# Patient Record
Sex: MALE | Race: WHITE | Employment: OTHER | URBAN - METROPOLITAN AREA
[De-identification: names, ages, dates, MRNs, and addresses within clinical notes are randomized per-mention and may not be internally consistent; named-entity substitution may affect disease eponyms.]

---

## 2017-05-15 ENCOUNTER — HOSPITAL ENCOUNTER (OUTPATIENT)
Dept: NUCLEAR MEDICINE | Age: 67
Discharge: HOME | End: 2017-05-15
Attending: ORTHOPAEDIC SURGERY
Payer: MEDICARE

## 2017-05-15 ENCOUNTER — HOSPITAL ENCOUNTER (OUTPATIENT)
Dept: LAB | Age: 67
Discharge: HOME | End: 2017-05-15
Attending: ORTHOPAEDIC SURGERY
Payer: MEDICARE

## 2017-05-15 DIAGNOSIS — M25.562 LEFT KNEE PAIN: ICD-10-CM

## 2017-05-15 LAB
BASOPHILS # BLD AUTO: 0 K/UL (ref 0–0.2)
BASOPHILS # BLD: 1 % (ref 0–2)
CRP SERPL-MCNC: 0.8 MG/DL (ref 0–0.9)
DIFFERENTIAL METHOD BLD: ABNORMAL
EOSINOPHIL # BLD: 0.4 K/UL (ref 0–0.8)
EOSINOPHIL NFR BLD: 6 % (ref 0.5–7.8)
ERYTHROCYTE [DISTWIDTH] IN BLOOD BY AUTOMATED COUNT: 14.1 % (ref 11.9–14.6)
ERYTHROCYTE [SEDIMENTATION RATE] IN BLOOD: 32 MM/HR (ref 0–20)
HCT VFR BLD AUTO: 38.3 % (ref 41.1–50.3)
HGB BLD-MCNC: 12.3 G/DL (ref 13.6–17.2)
IMM GRANULOCYTES # BLD: 0 K/UL (ref 0–0.5)
IMM GRANULOCYTES NFR BLD AUTO: 0.2 % (ref 0–5)
LYMPHOCYTES # BLD AUTO: 27 % (ref 13–44)
LYMPHOCYTES # BLD: 1.6 K/UL (ref 0.5–4.6)
MCH RBC QN AUTO: 29.6 PG (ref 26.1–32.9)
MCHC RBC AUTO-ENTMCNC: 32.1 G/DL (ref 31.4–35)
MCV RBC AUTO: 92.1 FL (ref 79.6–97.8)
MONOCYTES # BLD: 0.4 K/UL (ref 0.1–1.3)
MONOCYTES NFR BLD AUTO: 6 % (ref 4–12)
NEUTS SEG # BLD: 3.8 K/UL (ref 1.7–8.2)
NEUTS SEG NFR BLD AUTO: 60 % (ref 43–78)
PLATELET # BLD AUTO: 231 K/UL (ref 150–450)
PMV BLD AUTO: 11.1 FL (ref 10.8–14.1)
RBC # BLD AUTO: 4.16 M/UL (ref 4.23–5.67)
WBC # BLD AUTO: 6.2 K/UL (ref 4.3–11.1)

## 2017-05-15 PROCEDURE — 78306 BONE IMAGING WHOLE BODY: CPT

## 2017-06-14 ENCOUNTER — HOSPITAL ENCOUNTER (OUTPATIENT)
Dept: PHYSICAL THERAPY | Age: 67
Discharge: HOME | End: 2017-06-14
Payer: MEDICARE

## 2017-06-14 PROCEDURE — G8978 MOBILITY CURRENT STATUS: HCPCS

## 2017-06-14 PROCEDURE — G8979 MOBILITY GOAL STATUS: HCPCS

## 2017-06-14 PROCEDURE — 97162 PT EVAL MOD COMPLEX 30 MIN: CPT

## 2017-06-14 NOTE — PROGRESS NOTES
Dominga Bradley  : 1950 [facility] [place] [address], [address] [facility] [place]  [phone]   [phone]            OUTPATIENT PHYSICAL THERAPY:Initial Assessment 2017    ICD-10: Treatment Diagnosis:   · Pain in left knee (M25.562)  · Stiffness of left knee, not elsewhere classified (M25.662)   · Low back pain (M54.5)  Precautions/Allergies:   Latex; Adhesive; Lortab [hydrocodone-acetaminophen]; and Oxycontin [oxycodone]   Fall Risk Score: 1 (? 5 = High Risk)  MD Orders: Evaluate and Treat MEDICAL/REFERRING DIAGNOSIS:  Low back pain [M54.5]  Other specified enthesopathies of right lower limb, excluding foot [M76.891]  Other specified enthesopathies of left lower limb, excluding foot [M76.892]  Pain in left knee [M25.562]  Pain in right knee [M25.561]   DATE OF ONSET: Chronic  REFERRING PHYSICIAN: Sonia Mcrae MD  RETURN PHYSICIAN APPOINTMENT: TBD     INITIAL ASSESSMENT:  Mr. Chung Euceda presents with decreased mobility, decreased strength, and pain in left knee and back secondary to degenerative changes and weakness. After discussing with patient, he agreed he would benefit from physical therapy to improve above deficits. Please sign this plan of treatment if you concur. Thank you for the opportunity to serve this patient. PROBLEM LIST (Impacting functional limitations):  1. Decreased Strength  2. Decreased Balance  3. Increased Pain  4. Decreased Activity Tolerance INTERVENTIONS PLANNED:  1. Balance Exercise  2. Bed Mobility  3. Cold  4. Heat  5. Home Exercise Program (HEP)  6. Manual Therapy  7. Neuromuscular Re-education/Strengthening  8. Range of Motion (ROM)  9. Therapeutic Exercise/Strengthening  10. Transcutaneous Electrical Nerve Stimulation (TENS)  11. Ultrasound (US)   TREATMENT PLAN:  Effective Dates: 2017 TO 2017.   Frequency/Duration: 2 times a week for 8 weeks  GOALS: (Goals have been discussed and agreed upon with patient.)  Short-Term Functional Goals: Time Frame: 3 weeks  1. Patient will be independent with home exercise program without exacerbation of symptoms or cueing needed. 2. Patient will be independent with correct sleeping positions and awareness/avoidance of aggravating positions without cueing needed. Discharge Goals: Time Frame: 8 weeks  1. Patient will be independent with all ADLs with minimal onset of knee and back pain and no deficits with daily tasks. 2. Patient will report no fear avoidance with social or recreational activities due to knee and back pain. 3. Patient will score greater than or equal to 60/80 on Lower Extremity Functional Scale with minimal effect of pain on patient's ability to manage every day life activities. Rehabilitation Potential For Stated Goals: Good  Regarding Dorothea Chavez's therapy, I certify that the treatment plan above will be carried out by a therapist or under their direction. Thank you for this referral,  Carla Mabry PT     Referring Physician Signature: Michelle Johnson MD              Date                    The information in this section was collected on 6/14/2017 (except where otherwise noted). HISTORY:   History of Present Injury/Illness (Reason for Referral):  Patient is well known to physical therapist and returns today with a new order from his surgeon. He reports that his left knee is still bothering him in an area right below his knee cap. He reports that he was having hip and back pain also, but since he got shots in his hips, that pain has decreased. He reports he still has the knee pain (left greater than right) and back pain. He reports that he is not as active as he once was when he was working. He reports he knows he needs to be more active, but it bothers his knee to walk for more than 45 minutes. He reports he would just like to be able to move around without pain in his knee.   Past Medical History/Comorbidities:   Mr. Kari Oro has a past medical history of Arthritis; CAD (coronary artery disease); Chronic pain; COPD; Diabetes ([facility] Dyslipidemia; Heart failure ([facility] Hypertension; N&V (nausea and vomiting) (4/23/2015); and Unspecified sleep apnea. He also has no past medical history of Aneurysm ([facility] Arrhythmia; Asthma; Autoimmune disease ([facility] Cancer ([facility] Chronic kidney disease; Coagulation defects; Difficult intubation; GERD (gastroesophageal reflux disease); Ill-defined condition; Liver disease; Malignant hyperthermia due to anesthesia; Morbid obesity ([facility] Pseudocholinesterase deficiency; Psychiatric disorder; PUD (peptic ulcer disease); [facility] Stroke [facility]); Thromboembolus ([facility] Thyroid disease; or Unspecified adverse effect of anesthesia. Mr. Ana Carranza  has a past surgical history that includes orthopaedic (Left, 1975); shoulder arthroscopy (Left, 2004); shoulder arthroscopy (Right, 2006); knee arthroscopy (Left, 2014); cardiac surg procedure unlist (2006); and cataract removal (Left, 2015). Social History/Living Environment:   Home Environment: Private residence  Living Alone: No  Support Systems: Family member(s), Friends \ neighbors, Spouse  Prior Level of Function/Work/Activity:  Independent  Dominant Side:         RIGHT  Personal Factors:          Sex:  male        Age:  73 y.o. Profession:  Retired  Current Medications:       Current Outpatient Prescriptions:     HYDROmorphone (DILAUDID) 2 mg tablet, Take 1-2 Tabs by mouth every four (4) hours as needed. Max Daily Amount: 24 mg., Disp: 60 Tab, Rfl: 0    losartan (COZAAR) 50 mg tablet, Take 50 mg by mouth daily.  Indications: HYPERTENSION, Disp: , Rfl:     albuterol (PROVENTIL VENTOLIN) 2.5 mg /3 mL (0.083 %) nebulizer solution, , Disp: , Rfl: 6    BESIVANCE 0.6 % drps ophthalmic suspension, , Disp: , Rfl: 1    DUREZOL 0.05 % ophthalmic emulsion, , Disp: , Rfl: 1    ketorolac (ACULAR LS) 0.4 % drop, , Disp: , Rfl: 1    ADVAIR DISKUS 250-50 mcg/dose diskus inhaler, Take 1 Puff by inhalation two (2) times a day., Disp: , Rfl:     AXIRON 30 mg/actuation (1.5 mL) slpm, , Disp: , Rfl:     rosuvastatin (CRESTOR) 20 mg tablet, Take 20 mg by mouth nightly. Indications: MIXED HYPERLIPIDEMIA, Disp: , Rfl:     albuterol (PROVENTIL HFA, VENTOLIN HFA) 90 mcg/actuation inhaler, Take 2 Puffs by inhalation every four (4) hours as needed for Wheezing or Shortness of Breath. Take if needed DOS per anesthesia protocol. Bring DOS  Indications: CHRONIC OBSTRUCTIVE PULMONARY DISEASE, Disp: , Rfl:     carvedilol (COREG) 3.125 mg tablet, Take 3.125 mg by mouth two (2) times daily (with meals). Take DOS per anesthesia protocol., Disp: , Rfl:     tiotropium (SPIRIVA WITH HANDIHALER) 18 mcg inhalation capsule, Take 1 Cap by inhalation daily. Take DOS per anesthesia protocol. Indications: CHRONIC OBSTRUCTIVE PULMONARY DISEASE WITH BRONCHOSPASMS, Disp: , Rfl:    Date Last Reviewed:  6/14/2017    Number of Personal Factors/Comorbidities that affect the Plan of Care: 1-2: MODERATE COMPLEXITY   EXAMINATION:   Observation/Orthostatic Postural Assessment:          Posture Assessment: Rounded shoulders, Forward head   Palpation:          No tenderness to palpation noted. No bruising or swelling noted.   ROM:  L LE Assessment (AROM):   · Hip Flexion: 80 degrees    · Hip Extension: 10 degrees    · Hip Abduction: 45 degrees    · Hip Adduction: 0 degrees    · Hip Internal rotation: 45  degrees    · Hip External rotation: 70 degrees    · Knee Flexion: 115 degrees    · Knee Extension: 0 degrees           Strength:  L LE Assessment (Strength):   · Hip Flexion: 4/5 with manual muscle testing    · Hip Extension: 4/5 with manual muscle testing    · Hip Abduction: 4/5 with manual muscle testing    · Hip Adduction: 4/5 with manual muscle testing    · Hip Internal rotation: 3+/5 with manual muscle testing    · Hip External rotation: 3+/5 with manual muscle testing    · Knee Flexion: 3+/5 with manual muscle testing    · Knee Extension: 3+/5 with manual muscle testing     Special Tests:          Normal tibio-femoral movement in B LE. Normal patellar mobility in all planes. Neurological Screen:        Dermatomes: Within normal limits        Reflexes:  Within normal limits  Functional Mobility:         Gait/Ambulation:  Within normal limits        Transfers:  Within normal limits   Body Structures Involved:  1. Joints  2. Muscles Body Functions Affected:  1. Movement Related Activities and Participation Affected:  1. Mobility   Number of elements (examined above) that affect the Plan of Care: 4+: HIGH COMPLEXITY   CLINICAL PRESENTATION:   Presentation: Evolving clinical presentation with changing clinical characteristics: MODERATE COMPLEXITY   CLINICAL DECISION MAKING:   Outcome Measure: Tool Used: Lower Extremity Functional Scale (LEFS)  Score:  Initial: 32/80 Most Recent: X/80 (Date: -- )   Interpretation of Score: 20 questions each scored on a 5 point scale with 0 representing \"extreme difficulty or unable to perform\" and 4 representing \"no difficulty\". The lower the score, the greater the functional disability. 80/80 represents no disability. Minimal detectable change is 9 points. Score 80 79-63 62-48 47-32 31-16 15-1 0   Modifier CH CI CJ CK CL CM CN     ? Mobility - Walking and Moving Around:     - CURRENT STATUS: CK - 40%-59% impaired, limited or restricted    - GOAL STATUS: CJ - 20%-39% impaired, limited or restricted    - D/C STATUS:  ---------------To be determined---------------    Medical Necessity:   · Patient is expected to demonstrate progress in strength, range of motion, balance and coordination to improve safety during daily activities. · Patient demonstrates good rehab potential due to higher previous functional level. · Skilled intervention continues to be required due to pain in joint.   Reason for Services/Other Comments:  · Patient continues to demonstrate capacity to improve overall mobility which will increase independence and increase safety. Use of outcome tool(s) and clinical judgement create a POC that gives a: Questionable prediction of patient's progress: MODERATE COMPLEXITY            TREATMENT:   (In addition to Assessment/Re-Assessment sessions the following treatments were rendered)  Pre-treatment Symptoms/Complaints:  6/14/2017: Patient reports he is frustrated with the pain that is still in his knee. Pain: Initial:   Pain Intensity 1: 5  Pain Location 1: Knee, Hip  Pain Orientation 1: Left  Pain Intervention(s) 1: Rest, Medication (see MAR)  Post Session:  5/10     THERAPEUTIC EXERCISE: (10 minutes):  Exercises per grid below to improve mobility, strength, balance and coordination. Required minimal verbal and manual cues to promote proper body alignment, promote proper body posture and promote proper body mechanics. Progressed resistance, range, repetitions and complexity of movement as indicated. Date:  6/14/2017   Activity/Exercise Parameters   Stationary bike HEP   Wall squats HEP   Shoe inserts HEP     Treatment/Session Assessment:    · Response to Treatment:  Patient tolerated assessment without complaints of increased pain. Patient verbalized and demonstrated understanding of HEP. · Compliance with Program/Exercises: Will assess as treatment progresses. · Recommendations/Intent for next treatment session: \"Next visit will focus on advancements to more challenging activities\".   Total Treatment Duration:  PT Patient Time In/Time Out  Time In: 0830  Time Out: [address], PT

## 2017-06-14 NOTE — PROGRESS NOTES
Delia Dolan  : 1950 [facility] [place] [address], [address] [facility]  [phone]   [phone]          OUTPATIENT ORTHOPAEDIC PHYSICAL THERAPY    NAME/AGE/GENDER: Delia Dolan is a 73 y.o. male    DATE: 2017                       Ambulatory/Rehab Services H2 Model Falls Risk Assessment    Risk Factor Pts. ·   Confusion/Disorientation/Impulsivity  []    4 ·   Symptomatic Depression  []   2 ·   Altered Elimination  []   1 ·   Dizziness/Vertigo  []   1 ·   Gender (Male)  [x]   1 ·   Any administered antiepileptics (anticonvulsants):  []   2 ·   Any administered benzodiazepines:  []   1 ·   Visual Impairment (specify):  []   1 ·   Portable Oxygen Use  []   1 ·   Orthostatic ? BP  []   1 ·   History of Recent Falls (within 3 mos.)  []   5     Ability to Rise from Chair (choose one) Pts. ·   Ability to rise in a single movement  [x]   0 ·   Pushes up, successful in one attempt  []   1 ·   Multiple attempts, but unsuccessful  []   3 ·   Unable to rise without assistance  []   4   Total: (5 or greater = High Risk) 1     Falls Prevention Plan:   []                Physical Limitations to Exercise (specify):   []                Mobility Assistance Device (type):   []                Exercise/Equipment Adaptation (specify):    © [facility] . [facility] [place] Patent #[id number].  Federal Law prohibits the replication, distribution or use without written permission from [facility] of [address] [place] [address]

## 2017-06-16 ENCOUNTER — HOSPITAL ENCOUNTER (OUTPATIENT)
Dept: PHYSICAL THERAPY | Age: 67
Discharge: HOME | End: 2017-06-16
Payer: MEDICARE

## 2017-06-16 PROCEDURE — 97110 THERAPEUTIC EXERCISES: CPT

## 2017-06-16 NOTE — PROGRESS NOTES
Zoya Hernandez  : 1950 [facility] [address], [address] [place] [address],[address], [place] [address].  [phone]   [phone]            OUTPATIENT PHYSICAL THERAPY:Daily Note 2017    ICD-10: Treatment Diagnosis:   · Pain in left knee (M25.562)  · Stiffness of left knee, not elsewhere classified (M25.662)   · Low back pain (M54.5)  Precautions/Allergies:   Latex; Adhesive; Lortab [hydrocodone-acetaminophen]; and Oxycontin [oxycodone]   Fall Risk Score: 1 (? 5 = High Risk)  MD Orders: Evaluate and Treat MEDICAL/REFERRING DIAGNOSIS:  Low back pain [M54.5]  Other specified enthesopathies of right lower limb, excluding foot [M76.891]  Other specified enthesopathies of left lower limb, excluding foot [M76.892]  Pain in left knee [M25.562]  Pain in right knee [M25.561]   DATE OF ONSET: Chronic  REFERRING PHYSICIAN: Agustín Herrera MD  RETURN PHYSICIAN APPOINTMENT: TBD     INITIAL ASSESSMENT:  Mr. Oleg Hutchinson presents with decreased mobility, decreased strength, and pain in left knee and back secondary to degenerative changes and weakness. Patient has attended a total of 2 scheduled physical therapy visits including initial evaluation on 2017. Treatment has consisted of strengthening, stretching, manual, and modalities to improve overall mobility, pain, and performance with activities of daily living. PROBLEM LIST (Impacting functional limitations):  1. Decreased Strength  2. Decreased Balance  3. Increased Pain  4. Decreased Activity Tolerance INTERVENTIONS PLANNED:  1. Balance Exercise  2. Bed Mobility  3. Cold  4. Heat  5. Home Exercise Program (HEP)  6. Manual Therapy  7. Neuromuscular Re-education/Strengthening  8. Range of Motion (ROM)  9. Therapeutic Exercise/Strengthening  10. Transcutaneous Electrical Nerve Stimulation (TENS)  11. Ultrasound (US)   TREATMENT PLAN:  Effective Dates: 2017 TO 2017.   Frequency/Duration: 2 times a week for 8 weeks  GOALS: (Goals have been discussed and agreed upon with patient.)  Short-Term Functional Goals: Time Frame: 3 weeks  1. Patient will be independent with home exercise program without exacerbation of symptoms or cueing needed. 2. Patient will be independent with correct sleeping positions and awareness/avoidance of aggravating positions without cueing needed. Discharge Goals: Time Frame: 8 weeks  1. Patient will be independent with all ADLs with minimal onset of knee and back pain and no deficits with daily tasks. 2. Patient will report no fear avoidance with social or recreational activities due to knee and back pain. 3. Patient will score greater than or equal to 60/80 on Lower Extremity Functional Scale with minimal effect of pain on patient's ability to manage every day life activities. Rehabilitation Potential For Stated Goals: Good            The information in this section was collected on 6/14/2017 (except where otherwise noted). HISTORY:   History of Present Injury/Illness (Reason for Referral):  Patient is well known to physical therapist and returns today with a new order from his surgeon. He reports that his left knee is still bothering him in an area right below his knee cap. He reports that he was having hip and back pain also, but since he got shots in his hips, that pain has decreased. He reports he still has the knee pain (left greater than right) and back pain. He reports that he is not as active as he once was when he was working. He reports he knows he needs to be more active, but it bothers his knee to walk for more than 45 minutes. He reports he would just like to be able to move around without pain in his knee. Past Medical History/Comorbidities:   Mr. Lesly Flores  has a past medical history of Arthritis; CAD (coronary artery disease); Chronic pain; COPD; Diabetes ([address] Dyslipidemia; Heart failure ([facility] [address]  Hypertension; N&V (nausea and vomiting) (4/23/2015); and Unspecified sleep apnea. He also has no past medical history of Aneurysm ([facility] [address] Arrhythmia; Asthma; Autoimmune disease ([facility] [address] Cancer ([facility] [address] Chronic kidney disease; Coagulation defects; Difficult intubation; GERD (gastroesophageal reflux disease); Ill-defined condition; Liver disease; Malignant hyperthermia due to anesthesia; Morbid obesity ([facility] [address] Pseudocholinesterase deficiency; Psychiatric disorder; PUD (peptic ulcer disease); Seizures ([facility] [address] Stroke [facility]); Thromboembolus ([facility] [address] Thyroid disease; or Unspecified adverse effect of anesthesia. Mr. Antony Funez  has a past surgical history that includes orthopaedic (Left, 1975); shoulder arthroscopy (Left, 2004); shoulder arthroscopy (Right, 2006); knee arthroscopy (Left, 2014); cardiac surg procedure unlist (2006); and cataract removal (Left, 2015). Social History/Living Environment:   Home Environment: Private residence  Living Alone: No  Support Systems: Family member(s), Friends \ neighbors, Spouse  Prior Level of Function/Work/Activity:  Independent  Dominant Side:         RIGHT  Personal Factors:          Sex:  male        Age:  73 y.o. Profession:  Retired  Current Medications:       Current Outpatient Prescriptions:     HYDROmorphone (DILAUDID) 2 mg tablet, Take 1-2 Tabs by mouth every four (4) hours as needed. Max Daily Amount: 24 mg., Disp: 60 Tab, Rfl: 0    losartan (COZAAR) 50 mg tablet, Take 50 mg by mouth daily. Indications: HYPERTENSION, Disp: , Rfl:     albuterol (PROVENTIL VENTOLIN) 2.5 mg /3 mL (0.083 %) nebulizer solution, , Disp: , Rfl: 6    BESIVANCE 0.6 % drps ophthalmic suspension, , Disp: , Rfl: 1    DUREZOL 0.05 % ophthalmic emulsion, , Disp: , Rfl: 1    ketorolac (ACULAR LS) 0.4 % drop, , Disp: , Rfl: 1    ADVAIR DISKUS 250-50 mcg/dose diskus inhaler, Take 1 Puff by inhalation two (2) times a day., Disp: , Rfl:     AXIRON 30 mg/actuation (1.5 mL) slpm, , Disp: , Rfl:     rosuvastatin (CRESTOR) 20 mg tablet, Take 20 mg by mouth nightly.  Indications: MIXED HYPERLIPIDEMIA, Disp: , Rfl:     albuterol (PROVENTIL HFA, VENTOLIN HFA) 90 mcg/actuation inhaler, Take 2 Puffs by inhalation every four (4) hours as needed for Wheezing or Shortness of Breath. Take if needed DOS per anesthesia protocol. Bring DOS  Indications: CHRONIC OBSTRUCTIVE PULMONARY DISEASE, Disp: , Rfl:     carvedilol (COREG) 3.125 mg tablet, Take 3.125 mg by mouth two (2) times daily (with meals). Take DOS per anesthesia protocol., Disp: , Rfl:     tiotropium (SPIRIVA WITH HANDIHALER) 18 mcg inhalation capsule, Take 1 Cap by inhalation daily. Take DOS per anesthesia protocol. Indications: CHRONIC OBSTRUCTIVE PULMONARY DISEASE WITH BRONCHOSPASMS, Disp: , Rfl:    Date Last Reviewed:  6/16/2017    Number of Personal Factors/Comorbidities that affect the Plan of Care: 1-2: MODERATE COMPLEXITY   EXAMINATION:   Observation/Orthostatic Postural Assessment:          Posture Assessment: Rounded shoulders, Forward head   Palpation:          No tenderness to palpation noted. No bruising or swelling noted. ROM:  L LE Assessment (AROM):   · Hip Flexion: 80 degrees    · Hip Extension: 10 degrees    · Hip Abduction: 45 degrees    · Hip Adduction: 0 degrees    · Hip Internal rotation: 45  degrees    · Hip External rotation: 70 degrees    · Knee Flexion: 115 degrees    · Knee Extension: 0 degrees           Strength:  L LE Assessment (Strength):   · Hip Flexion: 4/5 with manual muscle testing    · Hip Extension: 4/5 with manual muscle testing    · Hip Abduction: 4/5 with manual muscle testing    · Hip Adduction: 4/5 with manual muscle testing    · Hip Internal rotation: 3+/5 with manual muscle testing    · Hip External rotation: 3+/5 with manual muscle testing    · Knee Flexion: 3+/5 with manual muscle testing    · Knee Extension: 3+/5 with manual muscle testing     Special Tests:          Normal tibio-femoral movement in B LE. Normal patellar mobility in all planes.   Neurological Screen:        Dermatomes: Within normal limits        Reflexes:  Within normal limits  Functional Mobility:         Gait/Ambulation:  Within normal limits        Transfers:  Within normal limits   Body Structures Involved:  1. Joints  2. Muscles Body Functions Affected:  1. Movement Related Activities and Participation Affected:  1. Mobility   Number of elements (examined above) that affect the Plan of Care: 4+: HIGH COMPLEXITY   CLINICAL PRESENTATION:   Presentation: Evolving clinical presentation with changing clinical characteristics: MODERATE COMPLEXITY   CLINICAL DECISION MAKING:   Outcome Measure: Tool Used: Lower Extremity Functional Scale (LEFS)  Score:  Initial: 32/80 Most Recent: X/80 (Date: -- )   Interpretation of Score: 20 questions each scored on a 5 point scale with 0 representing \"extreme difficulty or unable to perform\" and 4 representing \"no difficulty\". The lower the score, the greater the functional disability. 80/80 represents no disability. Minimal detectable change is 9 points. Score 80 79-63 62-48 47-32 31-16 15-1 0   Modifier CH CI CJ CK CL CM CN     ? Mobility - Walking and Moving Around:     - CURRENT STATUS: CK - 40%-59% impaired, limited or restricted    - GOAL STATUS: CJ - 20%-39% impaired, limited or restricted    - D/C STATUS:  ---------------To be determined---------------    Medical Necessity:   · Patient is expected to demonstrate progress in strength, range of motion, balance and coordination to improve safety during daily activities. · Patient demonstrates good rehab potential due to higher previous functional level. · Skilled intervention continues to be required due to pain in joint. Reason for Services/Other Comments:  · Patient continues to demonstrate capacity to improve overall mobility which will increase independence and increase safety.    Use of outcome tool(s) and clinical judgement create a POC that gives a: Questionable prediction of patient's progress: MODERATE COMPLEXITY            TREATMENT:   (In addition to Assessment/Re-Assessment sessions the following treatments were rendered)  Pre-treatment Symptoms/Complaints:  6/16/2017: Patient reports he has been riding his bike at home. Pain: Initial:   Pain Intensity 1: 5  Pain Location 1: Knee, Hip  Pain Orientation 1: Left  Pain Intervention(s) 1: Rest, Medication (see MAR)  Post Session:  5/10     THERAPEUTIC EXERCISE: (45 minutes):  Exercises per grid below to improve mobility, strength, balance and coordination. Required minimal verbal and manual cues to promote proper body alignment, promote proper body posture and promote proper body mechanics. Progressed resistance, range, repetitions and complexity of movement as indicated. Date:  6/16/2017   Activity/Exercise Parameters   Nu-step 6 minutes   Level 6   Step up and overs 6 inch  10 reps  B LE   Wall squats 15 reps   Slow march in edwards 2 laps   Side step in edwards 2 laps   Kneeling on hilo table with weight shifts 5 minutes   Leg press 160 pounds  20 reps   Leg extension 60 pounds  20 reps   Leg curl 60 pounds  20 reps   Airdyne 6 minutes   Calf stretch on slant board Gastroc and soleus stretch  3 reps each  10 second hold   Active hamstring stretch 10 ankle pumps  2 reps   Piriformis/posterior thigh stretch 10 second hold  Push and pull  3 reps     Treatment/Session Assessment:    · Response to Treatment:  Patient tolerated treatment without complaints of increased pain. · Compliance with Program/Exercises: Will assess as treatment progresses. · Recommendations/Intent for next treatment session: \"Next visit will focus on advancements to more challenging activities\".   Total Treatment Duration:  PT Patient Time In/Time Out  Time In: 1515  Time Out: [address], PT

## 2017-06-21 ENCOUNTER — HOSPITAL ENCOUNTER (OUTPATIENT)
Dept: PHYSICAL THERAPY | Age: 67
Discharge: HOME | End: 2017-06-21
Payer: MEDICARE

## 2017-06-21 PROCEDURE — 97110 THERAPEUTIC EXERCISES: CPT

## 2017-06-21 NOTE — PROGRESS NOTES
Zoya Hernandez  : 1950 [facility] [address], [address] [place] [address],[address], [place] [address].  [phone]   [phone]            OUTPATIENT PHYSICAL THERAPY:Daily Note 2017    ICD-10: Treatment Diagnosis:   · Pain in left knee (M25.562)  · Stiffness of left knee, not elsewhere classified (M25.662)   · Low back pain (M54.5)  Precautions/Allergies:   Latex; Adhesive; Lortab [hydrocodone-acetaminophen]; and Oxycontin [oxycodone]   Fall Risk Score: 1 (? 5 = High Risk)  MD Orders: Evaluate and Treat MEDICAL/REFERRING DIAGNOSIS:  Low back pain [M54.5]  Other specified enthesopathies of right lower limb, excluding foot [M76.891]  Other specified enthesopathies of left lower limb, excluding foot [M76.892]  Pain in left knee [M25.562]  Pain in right knee [M25.561]   DATE OF ONSET: Chronic  REFERRING PHYSICIAN: Agustín Herrera MD  RETURN PHYSICIAN APPOINTMENT: TBD     INITIAL ASSESSMENT:  Mr. Oleg Hutchinson presents with decreased mobility, decreased strength, and pain in left knee and back secondary to degenerative changes and weakness. Patient has attended a total of 3 scheduled physical therapy visits including initial evaluation on 2017. Treatment has consisted of strengthening, stretching, manual, and modalities to improve overall mobility, pain, and performance with activities of daily living. PROBLEM LIST (Impacting functional limitations):  1. Decreased Strength  2. Decreased Balance  3. Increased Pain  4. Decreased Activity Tolerance INTERVENTIONS PLANNED:  1. Balance Exercise  2. Bed Mobility  3. Cold  4. Heat  5. Home Exercise Program (HEP)  6. Manual Therapy  7. Neuromuscular Re-education/Strengthening  8. Range of Motion (ROM)  9. Therapeutic Exercise/Strengthening  10. Transcutaneous Electrical Nerve Stimulation (TENS)  11. Ultrasound (US)   TREATMENT PLAN:  Effective Dates: 2017 TO 2017.   Frequency/Duration: 2 times a week for 8 weeks  GOALS: (Goals have been discussed and agreed upon with patient.)  Short-Term Functional Goals: Time Frame: 3 weeks  1. Patient will be independent with home exercise program without exacerbation of symptoms or cueing needed. 2. Patient will be independent with correct sleeping positions and awareness/avoidance of aggravating positions without cueing needed. Discharge Goals: Time Frame: 8 weeks  1. Patient will be independent with all ADLs with minimal onset of knee and back pain and no deficits with daily tasks. 2. Patient will report no fear avoidance with social or recreational activities due to knee and back pain. 3. Patient will score greater than or equal to 60/80 on Lower Extremity Functional Scale with minimal effect of pain on patient's ability to manage every day life activities. Rehabilitation Potential For Stated Goals: Good            The information in this section was collected on 6/14/2017 (except where otherwise noted). HISTORY:   History of Present Injury/Illness (Reason for Referral):  Patient is well known to physical therapist and returns today with a new order from his surgeon. He reports that his left knee is still bothering him in an area right below his knee cap. He reports that he was having hip and back pain also, but since he got shots in his hips, that pain has decreased. He reports he still has the knee pain (left greater than right) and back pain. He reports that he is not as active as he once was when he was working. He reports he knows he needs to be more active, but it bothers his knee to walk for more than 45 minutes. He reports he would just like to be able to move around without pain in his knee. Past Medical History/Comorbidities:   Mr. Oleg Hutchinson  has a past medical history of Arthritis; CAD (coronary artery disease); Chronic pain; COPD; Diabetes ([address] Dyslipidemia; Heart failure ([facility] [address]  Hypertension; N&V (nausea and vomiting) (4/23/2015); and Unspecified sleep apnea. He also has no past medical history of Aneurysm ([facility] [address] Arrhythmia; Asthma; Autoimmune disease ([facility] [address] Cancer ([facility] [address] Chronic kidney disease; Coagulation defects; Difficult intubation; GERD (gastroesophageal reflux disease); Ill-defined condition; Liver disease; Malignant hyperthermia due to anesthesia; Morbid obesity ([facility] [address] Pseudocholinesterase deficiency; Psychiatric disorder; PUD (peptic ulcer disease); Seizures ([facility] [address] Stroke [facility]); Thromboembolus ([facility] [address] Thyroid disease; or Unspecified adverse effect of anesthesia. Mr. Colletta Ladd  has a past surgical history that includes orthopaedic (Left, 1975); shoulder arthroscopy (Left, 2004); shoulder arthroscopy (Right, 2006); knee arthroscopy (Left, 2014); cardiac surg procedure unlist (2006); and cataract removal (Left, 2015). Social History/Living Environment:   Home Environment: Private residence  Living Alone: No  Support Systems: Family member(s), Friends \ neighbors, Spouse  Prior Level of Function/Work/Activity:  Independent  Dominant Side:         RIGHT  Personal Factors:          Sex:  male        Age:  73 y.o. Profession:  Retired  Current Medications:       Current Outpatient Prescriptions:     HYDROmorphone (DILAUDID) 2 mg tablet, Take 1-2 Tabs by mouth every four (4) hours as needed. Max Daily Amount: 24 mg., Disp: 60 Tab, Rfl: 0    losartan (COZAAR) 50 mg tablet, Take 50 mg by mouth daily. Indications: HYPERTENSION, Disp: , Rfl:     albuterol (PROVENTIL VENTOLIN) 2.5 mg /3 mL (0.083 %) nebulizer solution, , Disp: , Rfl: 6    BESIVANCE 0.6 % drps ophthalmic suspension, , Disp: , Rfl: 1    DUREZOL 0.05 % ophthalmic emulsion, , Disp: , Rfl: 1    ketorolac (ACULAR LS) 0.4 % drop, , Disp: , Rfl: 1    ADVAIR DISKUS 250-50 mcg/dose diskus inhaler, Take 1 Puff by inhalation two (2) times a day., Disp: , Rfl:     AXIRON 30 mg/actuation (1.5 mL) slpm, , Disp: , Rfl:     rosuvastatin (CRESTOR) 20 mg tablet, Take 20 mg by mouth nightly.  Indications: MIXED HYPERLIPIDEMIA, Disp: , Rfl:     albuterol (PROVENTIL HFA, VENTOLIN HFA) 90 mcg/actuation inhaler, Take 2 Puffs by inhalation every four (4) hours as needed for Wheezing or Shortness of Breath. Take if needed DOS per anesthesia protocol. Bring DOS  Indications: CHRONIC OBSTRUCTIVE PULMONARY DISEASE, Disp: , Rfl:     carvedilol (COREG) 3.125 mg tablet, Take 3.125 mg by mouth two (2) times daily (with meals). Take DOS per anesthesia protocol., Disp: , Rfl:     tiotropium (SPIRIVA WITH HANDIHALER) 18 mcg inhalation capsule, Take 1 Cap by inhalation daily. Take DOS per anesthesia protocol. Indications: CHRONIC OBSTRUCTIVE PULMONARY DISEASE WITH BRONCHOSPASMS, Disp: , Rfl:    Date Last Reviewed:  6/21/2017    Number of Personal Factors/Comorbidities that affect the Plan of Care: 1-2: MODERATE COMPLEXITY   EXAMINATION:   Observation/Orthostatic Postural Assessment:          Posture Assessment: Rounded shoulders, Forward head   Palpation:          No tenderness to palpation noted. No bruising or swelling noted. ROM:  L LE Assessment (AROM):   · Hip Flexion: 80 degrees    · Hip Extension: 10 degrees    · Hip Abduction: 45 degrees    · Hip Adduction: 0 degrees    · Hip Internal rotation: 45  degrees    · Hip External rotation: 70 degrees    · Knee Flexion: 115 degrees    · Knee Extension: 0 degrees           Strength:  L LE Assessment (Strength):   · Hip Flexion: 4/5 with manual muscle testing    · Hip Extension: 4/5 with manual muscle testing    · Hip Abduction: 4/5 with manual muscle testing    · Hip Adduction: 4/5 with manual muscle testing    · Hip Internal rotation: 3+/5 with manual muscle testing    · Hip External rotation: 3+/5 with manual muscle testing    · Knee Flexion: 3+/5 with manual muscle testing    · Knee Extension: 3+/5 with manual muscle testing     Special Tests:          Normal tibio-femoral movement in B LE. Normal patellar mobility in all planes.   Neurological Screen:        Dermatomes: Within normal limits        Reflexes:  Within normal limits  Functional Mobility:         Gait/Ambulation:  Within normal limits        Transfers:  Within normal limits   Body Structures Involved:  1. Joints  2. Muscles Body Functions Affected:  1. Movement Related Activities and Participation Affected:  1. Mobility   Number of elements (examined above) that affect the Plan of Care: 4+: HIGH COMPLEXITY   CLINICAL PRESENTATION:   Presentation: Evolving clinical presentation with changing clinical characteristics: MODERATE COMPLEXITY   CLINICAL DECISION MAKING:   Outcome Measure: Tool Used: Lower Extremity Functional Scale (LEFS)  Score:  Initial: 32/80 Most Recent: X/80 (Date: -- )   Interpretation of Score: 20 questions each scored on a 5 point scale with 0 representing \"extreme difficulty or unable to perform\" and 4 representing \"no difficulty\". The lower the score, the greater the functional disability. 80/80 represents no disability. Minimal detectable change is 9 points. Score 80 79-63 62-48 47-32 31-16 15-1 0   Modifier CH CI CJ CK CL CM CN     ? Mobility - Walking and Moving Around:     - CURRENT STATUS: CK - 40%-59% impaired, limited or restricted    - GOAL STATUS: CJ - 20%-39% impaired, limited or restricted    - D/C STATUS:  ---------------To be determined---------------    Medical Necessity:   · Patient is expected to demonstrate progress in strength, range of motion, balance and coordination to improve safety during daily activities. · Patient demonstrates good rehab potential due to higher previous functional level. · Skilled intervention continues to be required due to pain in joint. Reason for Services/Other Comments:  · Patient continues to demonstrate capacity to improve overall mobility which will increase independence and increase safety.    Use of outcome tool(s) and clinical judgement create a POC that gives a: Questionable prediction of patient's progress: MODERATE COMPLEXITY            TREATMENT:   (In addition to Assessment/Re-Assessment sessions the following treatments were rendered)  Pre-treatment Symptoms/Complaints:  6/21/2017: Patient reports he was sore after his last session. Pain: Initial:   Pain Intensity 1: 5  Pain Location 1: Knee, Hip  Pain Orientation 1: Left  Pain Intervention(s) 1: Rest, Medication (see MAR)  Post Session:  5/10     THERAPEUTIC EXERCISE: (45 minutes):  Exercises per grid below to improve mobility, strength, balance and coordination. Required minimal verbal and manual cues to promote proper body alignment, promote proper body posture and promote proper body mechanics. Progressed resistance, range, repetitions and complexity of movement as indicated. Date:  6/21/2017   Activity/Exercise Parameters   Nu-step 7 minutes   Level 5   Step up and overs 8 inch  10 reps  B LE   Wall squats 15 reps   Slow march in edwards 2 laps   Kneeling on hilo table with weight shifts 5 minutes   Leg press 160 pounds  20 reps   Leg extension 60 pounds  20 reps   Leg curl 60 pounds  20 reps   Eliptical 3 minutes   Calf stretch on slant board Gastroc and soleus stretch  3 reps each  10 second hold     Treatment/Session Assessment:    · Response to Treatment:  Patient tolerated treatment without complaints of increased pain. · Compliance with Program/Exercises: Will assess as treatment progresses. · Recommendations/Intent for next treatment session: \"Next visit will focus on advancements to more challenging activities\".   Total Treatment Duration:  PT Patient Time In/Time Out  Time In: 0930  Time Out: [address], PT

## 2017-06-23 ENCOUNTER — HOSPITAL ENCOUNTER (OUTPATIENT)
Dept: PHYSICAL THERAPY | Age: 67
Discharge: HOME | End: 2017-06-23
Payer: MEDICARE

## 2017-06-23 PROCEDURE — 97110 THERAPEUTIC EXERCISES: CPT

## 2017-06-23 NOTE — PROGRESS NOTES
Yosef Bunn  : 1950 [facility] at [address], [address] [place] [address],[address], [facility] [address].  [phone]   [phone]            OUTPATIENT PHYSICAL THERAPY:Daily Note 2017    ICD-10: Treatment Diagnosis:   · Pain in left knee (M25.562)  · Stiffness of left knee, not elsewhere classified (M25.662)   · Low back pain (M54.5)  Precautions/Allergies:   Latex; Adhesive; Lortab [hydrocodone-acetaminophen]; and Oxycontin [oxycodone]   Fall Risk Score: 1 (? 5 = High Risk)  MD Orders: Evaluate and Treat MEDICAL/REFERRING DIAGNOSIS:  Low back pain [M54.5]  Other specified enthesopathies of right lower limb, excluding foot [M76.891]  Other specified enthesopathies of left lower limb, excluding foot [M76.892]  Pain in left knee [M25.562]  Pain in right knee [M25.561]   DATE OF ONSET: Chronic  REFERRING PHYSICIAN: Ashley Reynaga MD  RETURN PHYSICIAN APPOINTMENT: TBD     INITIAL ASSESSMENT:  Mr. Liza Kyle presents with decreased mobility, decreased strength, and pain in left knee and back secondary to degenerative changes and weakness. Patient has attended a total of 4 scheduled physical therapy visits including initial evaluation on 2017. Treatment has consisted of strengthening, stretching, manual, and modalities to improve overall mobility, pain, and performance with activities of daily living. PROBLEM LIST (Impacting functional limitations):  1. Decreased Strength  2. Decreased Balance  3. Increased Pain  4. Decreased Activity Tolerance INTERVENTIONS PLANNED:  1. Balance Exercise  2. Bed Mobility  3. Cold  4. Heat  5. Home Exercise Program (HEP)  6. Manual Therapy  7. Neuromuscular Re-education/Strengthening  8. Range of Motion (ROM)  9. Therapeutic Exercise/Strengthening  10. Transcutaneous Electrical Nerve Stimulation (TENS)  11. Ultrasound (US)   TREATMENT PLAN:  Effective Dates: 2017 TO 2017.   Frequency/Duration: 2 times a week for 8 weeks  GOALS: (Goals have been discussed and agreed upon with patient.)  Short-Term Functional Goals: Time Frame: 3 weeks  1. Patient will be independent with home exercise program without exacerbation of symptoms or cueing needed. 2. Patient will be independent with correct sleeping positions and awareness/avoidance of aggravating positions without cueing needed. Discharge Goals: Time Frame: 8 weeks  1. Patient will be independent with all ADLs with minimal onset of knee and back pain and no deficits with daily tasks. 2. Patient will report no fear avoidance with social or recreational activities due to knee and back pain. 3. Patient will score greater than or equal to 60/80 on Lower Extremity Functional Scale with minimal effect of pain on patient's ability to manage every day life activities. Rehabilitation Potential For Stated Goals: Good            The information in this section was collected on 6/14/2017 (except where otherwise noted). HISTORY:   History of Present Injury/Illness (Reason for Referral):  Patient is well known to physical therapist and returns today with a new order from his surgeon. He reports that his left knee is still bothering him in an area right below his knee cap. He reports that he was having hip and back pain also, but since he got shots in his hips, that pain has decreased. He reports he still has the knee pain (left greater than right) and back pain. He reports that he is not as active as he once was when he was working. He reports he knows he needs to be more active, but it bothers his knee to walk for more than 45 minutes. He reports he would just like to be able to move around without pain in his knee. Past Medical History/Comorbidities:   Mr. Danie Bautista  has a past medical history of Arthritis; CAD (coronary artery disease); Chronic pain; COPD; Diabetes ([address] Dyslipidemia; Heart failure ([address]  Hypertension; N&V (nausea and vomiting) (4/23/2015); and Unspecified sleep apnea. He also has no past medical history of Aneurysm ([facility] Arrhythmia; Asthma; Autoimmune disease ([facility] Cancer ([facility] Chronic kidney disease; Coagulation defects; Difficult intubation; GERD (gastroesophageal reflux disease); Ill-defined condition; Liver disease; Malignant hyperthermia due to anesthesia; Morbid obesity ([facility] Pseudocholinesterase deficiency; Psychiatric disorder; PUD (peptic ulcer disease); Seizures ([facility] Stroke [facility]); Thromboembolus ([facility] Thyroid disease; or Unspecified adverse effect of anesthesia. Mr. Liza Kyle  has a past surgical history that includes orthopaedic (Left, 1975); shoulder arthroscopy (Left, 2004); shoulder arthroscopy (Right, 2006); knee arthroscopy (Left, 2014); cardiac surg procedure unlist (2006); and cataract removal (Left, 2015). Social History/Living Environment:   Home Environment: Private residence  Living Alone: No  Support Systems: Family member(s), Friends \ neighbors, Spouse  Prior Level of Function/Work/Activity:  Independent  Dominant Side:         RIGHT  Personal Factors:          Sex:  male        Age:  73 y.o. Profession:  Retired  Current Medications:       Current Outpatient Prescriptions:     HYDROmorphone (DILAUDID) 2 mg tablet, Take 1-2 Tabs by mouth every four (4) hours as needed. Max Daily Amount: 24 mg., Disp: 60 Tab, Rfl: 0    losartan (COZAAR) 50 mg tablet, Take 50 mg by mouth daily. Indications: HYPERTENSION, Disp: , Rfl:     albuterol (PROVENTIL VENTOLIN) 2.5 mg /3 mL (0.083 %) nebulizer solution, , Disp: , Rfl: 6    BESIVANCE 0.6 % drps ophthalmic suspension, , Disp: , Rfl: 1    DUREZOL 0.05 % ophthalmic emulsion, , Disp: , Rfl: 1    ketorolac (ACULAR LS) 0.4 % drop, , Disp: , Rfl: 1    ADVAIR DISKUS 250-50 mcg/dose diskus inhaler, Take 1 Puff by inhalation two (2) times a day., Disp: , Rfl:     AXIRON 30 mg/actuation (1.5 mL) slpm, , Disp: , Rfl:     rosuvastatin (CRESTOR) 20 mg tablet, Take 20 mg by mouth nightly.  Indications: MIXED HYPERLIPIDEMIA, Disp: , Rfl:     albuterol (PROVENTIL HFA, VENTOLIN HFA) 90 mcg/actuation inhaler, Take 2 Puffs by inhalation every four (4) hours as needed for Wheezing or Shortness of Breath. Take if needed DOS per anesthesia protocol. Bring DOS  Indications: CHRONIC OBSTRUCTIVE PULMONARY DISEASE, Disp: , Rfl:     carvedilol (COREG) 3.125 mg tablet, Take 3.125 mg by mouth two (2) times daily (with meals). Take DOS per anesthesia protocol., Disp: , Rfl:     tiotropium (SPIRIVA WITH HANDIHALER) 18 mcg inhalation capsule, Take 1 Cap by inhalation daily. Take DOS per anesthesia protocol. Indications: CHRONIC OBSTRUCTIVE PULMONARY DISEASE WITH BRONCHOSPASMS, Disp: , Rfl:    Date Last Reviewed:  6/23/2017    Number of Personal Factors/Comorbidities that affect the Plan of Care: 1-2: MODERATE COMPLEXITY   EXAMINATION:   Observation/Orthostatic Postural Assessment:          Posture Assessment: Rounded shoulders, Forward head   Palpation:          No tenderness to palpation noted. No bruising or swelling noted. ROM:  L LE Assessment (AROM):   · Hip Flexion: 80 degrees    · Hip Extension: 10 degrees    · Hip Abduction: 45 degrees    · Hip Adduction: 0 degrees    · Hip Internal rotation: 45  degrees    · Hip External rotation: 70 degrees    · Knee Flexion: 115 degrees    · Knee Extension: 0 degrees           Strength:  L LE Assessment (Strength):   · Hip Flexion: 4/5 with manual muscle testing    · Hip Extension: 4/5 with manual muscle testing    · Hip Abduction: 4/5 with manual muscle testing    · Hip Adduction: 4/5 with manual muscle testing    · Hip Internal rotation: 3+/5 with manual muscle testing    · Hip External rotation: 3+/5 with manual muscle testing    · Knee Flexion: 3+/5 with manual muscle testing    · Knee Extension: 3+/5 with manual muscle testing     Special Tests:          Normal tibio-femoral movement in B LE. Normal patellar mobility in all planes.   Neurological Screen:        Dermatomes: Within normal limits        Reflexes:  Within normal limits  Functional Mobility:         Gait/Ambulation:  Within normal limits        Transfers:  Within normal limits   Body Structures Involved:  1. Joints  2. Muscles Body Functions Affected:  1. Movement Related Activities and Participation Affected:  1. Mobility   Number of elements (examined above) that affect the Plan of Care: 4+: HIGH COMPLEXITY   CLINICAL PRESENTATION:   Presentation: Evolving clinical presentation with changing clinical characteristics: MODERATE COMPLEXITY   CLINICAL DECISION MAKING:   Outcome Measure: Tool Used: Lower Extremity Functional Scale (LEFS)  Score:  Initial: 32/80 Most Recent: X/80 (Date: -- )   Interpretation of Score: 20 questions each scored on a 5 point scale with 0 representing \"extreme difficulty or unable to perform\" and 4 representing \"no difficulty\". The lower the score, the greater the functional disability. 80/80 represents no disability. Minimal detectable change is 9 points. Score 80 79-63 62-48 47-32 31-16 15-1 0   Modifier CH CI CJ CK CL CM CN     ? Mobility - Walking and Moving Around:     - CURRENT STATUS: CK - 40%-59% impaired, limited or restricted    - GOAL STATUS: CJ - 20%-39% impaired, limited or restricted    - D/C STATUS:  ---------------To be determined---------------    Medical Necessity:   · Patient is expected to demonstrate progress in strength, range of motion, balance and coordination to improve safety during daily activities. · Patient demonstrates good rehab potential due to higher previous functional level. · Skilled intervention continues to be required due to pain in joint. Reason for Services/Other Comments:  · Patient continues to demonstrate capacity to improve overall mobility which will increase independence and increase safety.    Use of outcome tool(s) and clinical judgement create a POC that gives a: Questionable prediction of patient's progress: MODERATE COMPLEXITY            TREATMENT:   (In addition to Assessment/Re-Assessment sessions the following treatments were rendered)  Pre-treatment Symptoms/Complaints:  6/23/2017: Patient reports his knee is really bothering him today. Pain: Initial:   Pain Intensity 1: 5  Pain Location 1: Knee, Hip  Pain Orientation 1: Left  Pain Intervention(s) 1: Rest, Medication (see MAR)  Post Session:  5/10     THERAPEUTIC EXERCISE: (45 minutes):  Exercises per grid below to improve mobility, strength, balance and coordination. Required minimal verbal and manual cues to promote proper body alignment, promote proper body posture and promote proper body mechanics. Progressed resistance, range, repetitions and complexity of movement as indicated. Date:  6/23/2017   Activity/Exercise Parameters   Nu-step 7 minutes   Level 5   Step up and overs 8 inch  10 reps  B LE   Wall squats 15 reps   Slow march in edwards 2 laps   Kneeling on hilo table with weight shifts 5 minutes   Leg press 160 pounds  20 reps   Leg extension 60 pounds  20 reps   Leg curl 60 pounds  20 reps   Eliptical 3 minutes   Calf stretch on slant board Gastroc and soleus stretch  3 reps each  10 second hold     Treatment/Session Assessment:    · Response to Treatment:  Patient tolerated treatment without complaints of increased pain. · Compliance with Program/Exercises: Mostly compliant. · Recommendations/Intent for next treatment session: \"Next visit will focus on advancements to more challenging activities\".   Total Treatment Duration:  PT Patient Time In/Time Out  Time In: 0730  Time Out: [id number], PT

## 2017-06-27 ENCOUNTER — HOSPITAL ENCOUNTER (OUTPATIENT)
Dept: PHYSICAL THERAPY | Age: 67
Discharge: HOME | End: 2017-06-27
Payer: MEDICARE

## 2017-06-27 PROCEDURE — 97110 THERAPEUTIC EXERCISES: CPT

## 2017-06-27 NOTE — PROGRESS NOTES
Yasmeen Johnson  : 1950 [facility] at [facility], [address] [address],[address], [place] [address].  [phone]   [phone]            OUTPATIENT PHYSICAL THERAPY:Daily Note 2017    ICD-10: Treatment Diagnosis:   · Pain in left knee (M25.562)  · Stiffness of left knee, not elsewhere classified (M25.662)   · Low back pain (M54.5)  Precautions/Allergies:   Latex; Adhesive; Lortab [hydrocodone-acetaminophen]; and Oxycontin [oxycodone]   Fall Risk Score: 1 (? 5 = High Risk)  MD Orders: Evaluate and Treat MEDICAL/REFERRING DIAGNOSIS:  Low back pain [M54.5]  Other specified enthesopathies of right lower limb, excluding foot [M76.891]  Other specified enthesopathies of left lower limb, excluding foot [M76.892]  Pain in left knee [M25.562]  Pain in right knee [M25.561]   DATE OF ONSET: Chronic  REFERRING PHYSICIAN: Kari Cottrell MD  RETURN PHYSICIAN APPOINTMENT: TBD     INITIAL ASSESSMENT:  Mr. Brent Hopson presents with decreased mobility, decreased strength, and pain in left knee and back secondary to degenerative changes and weakness. Patient has attended a total of 5 scheduled physical therapy visits including initial evaluation on 2017. Treatment has consisted of strengthening, stretching, manual, and modalities to improve overall mobility, pain, and performance with activities of daily living. PROBLEM LIST (Impacting functional limitations):  1. Decreased Strength  2. Decreased Balance  3. Increased Pain  4. Decreased Activity Tolerance INTERVENTIONS PLANNED:  1. Balance Exercise  2. Bed Mobility  3. Cold  4. Heat  5. Home Exercise Program (HEP)  6. Manual Therapy  7. Neuromuscular Re-education/Strengthening  8. Range of Motion (ROM)  9. Therapeutic Exercise/Strengthening  10. Transcutaneous Electrical Nerve Stimulation (TENS)  11. Ultrasound (US)   TREATMENT PLAN:  Effective Dates: 2017 TO 2017.   Frequency/Duration: 2 times a week for 8 weeks  GOALS: (Goals have been discussed and agreed upon with patient.)  Short-Term Functional Goals: Time Frame: 3 weeks  1. Patient will be independent with home exercise program without exacerbation of symptoms or cueing needed. 2. Patient will be independent with correct sleeping positions and awareness/avoidance of aggravating positions without cueing needed. Discharge Goals: Time Frame: 8 weeks  1. Patient will be independent with all ADLs with minimal onset of knee and back pain and no deficits with daily tasks. 2. Patient will report no fear avoidance with social or recreational activities due to knee and back pain. 3. Patient will score greater than or equal to 60/80 on Lower Extremity Functional Scale with minimal effect of pain on patient's ability to manage every day life activities. Rehabilitation Potential For Stated Goals: Good            The information in this section was collected on 6/14/2017 (except where otherwise noted). HISTORY:   History of Present Injury/Illness (Reason for Referral):  Patient is well known to physical therapist and returns today with a new order from his surgeon. He reports that his left knee is still bothering him in an area right below his knee cap. He reports that he was having hip and back pain also, but since he got shots in his hips, that pain has decreased. He reports he still has the knee pain (left greater than right) and back pain. He reports that he is not as active as he once was when he was working. He reports he knows he needs to be more active, but it bothers his knee to walk for more than 45 minutes. He reports he would just like to be able to move around without pain in his knee. Past Medical History/Comorbidities:   Mr. Tony Tomas  has a past medical history of Arthritis; CAD (coronary artery disease); Chronic pain; COPD; Diabetes ([address] Dyslipidemia; Heart failure ([facility]  Hypertension; N&V (nausea and vomiting) (4/23/2015); and Unspecified sleep apnea. He also has no past medical history of Aneurysm ([facility] Arrhythmia; Asthma; Autoimmune disease ([facility] Cancer ([facility] Chronic kidney disease; Coagulation defects; Difficult intubation; GERD (gastroesophageal reflux disease); Ill-defined condition; Liver disease; Malignant hyperthermia due to anesthesia; Morbid obesity ([facility] Pseudocholinesterase deficiency; Psychiatric disorder; PUD (peptic ulcer disease); Seizures ([facility] [facility] Stroke [facility]); Thromboembolus ([facility] Thyroid disease; or Unspecified adverse effect of anesthesia. Mr. Ashwin Irving  has a past surgical history that includes orthopaedic (Left, 1975); shoulder arthroscopy (Left, 2004); shoulder arthroscopy (Right, 2006); knee arthroscopy (Left, 2014); cardiac surg procedure unlist (2006); and cataract removal (Left, 2015). Social History/Living Environment:   Home Environment: Private residence  Living Alone: No  Support Systems: Family member(s), Friends \ neighbors, Spouse  Prior Level of Function/Work/Activity:  Independent  Dominant Side:         RIGHT  Personal Factors:          Sex:  male        Age:  73 y.o. Profession:  Retired  Current Medications:       Current Outpatient Prescriptions:     HYDROmorphone (DILAUDID) 2 mg tablet, Take 1-2 Tabs by mouth every four (4) hours as needed. Max Daily Amount: 24 mg., Disp: 60 Tab, Rfl: 0    losartan (COZAAR) 50 mg tablet, Take 50 mg by mouth daily. Indications: HYPERTENSION, Disp: , Rfl:     albuterol (PROVENTIL VENTOLIN) 2.5 mg /3 mL (0.083 %) nebulizer solution, , Disp: , Rfl: 6    BESIVANCE 0.6 % drps ophthalmic suspension, , Disp: , Rfl: 1    DUREZOL 0.05 % ophthalmic emulsion, , Disp: , Rfl: 1    ketorolac (ACULAR LS) 0.4 % drop, , Disp: , Rfl: 1    ADVAIR DISKUS 250-50 mcg/dose diskus inhaler, Take 1 Puff by inhalation two (2) times a day., Disp: , Rfl:     AXIRON 30 mg/actuation (1.5 mL) slpm, , Disp: , Rfl:     rosuvastatin (CRESTOR) 20 mg tablet, Take 20 mg by mouth nightly.  Indications: MIXED HYPERLIPIDEMIA, Disp: , Rfl:     albuterol (PROVENTIL HFA, VENTOLIN HFA) 90 mcg/actuation inhaler, Take 2 Puffs by inhalation every four (4) hours as needed for Wheezing or Shortness of Breath. Take if needed DOS per anesthesia protocol. Bring DOS  Indications: CHRONIC OBSTRUCTIVE PULMONARY DISEASE, Disp: , Rfl:     carvedilol (COREG) 3.125 mg tablet, Take 3.125 mg by mouth two (2) times daily (with meals). Take DOS per anesthesia protocol., Disp: , Rfl:     tiotropium (SPIRIVA WITH HANDIHALER) 18 mcg inhalation capsule, Take 1 Cap by inhalation daily. Take DOS per anesthesia protocol. Indications: CHRONIC OBSTRUCTIVE PULMONARY DISEASE WITH BRONCHOSPASMS, Disp: , Rfl:    Date Last Reviewed:  6/27/2017    Number of Personal Factors/Comorbidities that affect the Plan of Care: 1-2: MODERATE COMPLEXITY   EXAMINATION:   Observation/Orthostatic Postural Assessment:          Posture Assessment: Rounded shoulders, Forward head   Palpation:          No tenderness to palpation noted. No bruising or swelling noted. ROM:  L LE Assessment (AROM):   · Hip Flexion: 80 degrees    · Hip Extension: 10 degrees    · Hip Abduction: 45 degrees    · Hip Adduction: 0 degrees    · Hip Internal rotation: 45  degrees    · Hip External rotation: 70 degrees    · Knee Flexion: 115 degrees    · Knee Extension: 0 degrees           Strength:  L LE Assessment (Strength):   · Hip Flexion: 4/5 with manual muscle testing    · Hip Extension: 4/5 with manual muscle testing    · Hip Abduction: 4/5 with manual muscle testing    · Hip Adduction: 4/5 with manual muscle testing    · Hip Internal rotation: 3+/5 with manual muscle testing    · Hip External rotation: 3+/5 with manual muscle testing    · Knee Flexion: 3+/5 with manual muscle testing    · Knee Extension: 3+/5 with manual muscle testing     Special Tests:          Normal tibio-femoral movement in B LE. Normal patellar mobility in all planes.   Neurological Screen:        Dermatomes: Within normal limits        Reflexes:  Within normal limits  Functional Mobility:         Gait/Ambulation:  Within normal limits        Transfers:  Within normal limits   Body Structures Involved:  1. Joints  2. Muscles Body Functions Affected:  1. Movement Related Activities and Participation Affected:  1. Mobility   Number of elements (examined above) that affect the Plan of Care: 4+: HIGH COMPLEXITY   CLINICAL PRESENTATION:   Presentation: Evolving clinical presentation with changing clinical characteristics: MODERATE COMPLEXITY   CLINICAL DECISION MAKING:   Outcome Measure: Tool Used: Lower Extremity Functional Scale (LEFS)  Score:  Initial: 32/80 Most Recent: X/80 (Date: -- )   Interpretation of Score: 20 questions each scored on a 5 point scale with 0 representing \"extreme difficulty or unable to perform\" and 4 representing \"no difficulty\". The lower the score, the greater the functional disability. 80/80 represents no disability. Minimal detectable change is 9 points. Score 80 79-63 62-48 47-32 31-16 15-1 0   Modifier CH CI CJ CK CL CM CN     ? Mobility - Walking and Moving Around:     - CURRENT STATUS: CK - 40%-59% impaired, limited or restricted    - GOAL STATUS: CJ - 20%-39% impaired, limited or restricted    - D/C STATUS:  ---------------To be determined---------------    Medical Necessity:   · Patient is expected to demonstrate progress in strength, range of motion, balance and coordination to improve safety during daily activities. · Patient demonstrates good rehab potential due to higher previous functional level. · Skilled intervention continues to be required due to pain in joint. Reason for Services/Other Comments:  · Patient continues to demonstrate capacity to improve overall mobility which will increase independence and increase safety.    Use of outcome tool(s) and clinical judgement create a POC that gives a: Questionable prediction of patient's progress: MODERATE COMPLEXITY            TREATMENT:   (In addition to Assessment/Re-Assessment sessions the following treatments were rendered)  Pre-treatment Symptoms/Complaints:  6/27/2017: Patient reports he didn't feel good over the weekend, but it was his stomach, not his knee, but he didn't do his exercises. Pain: Initial:   Pain Intensity 1: 5  Pain Location 1: Knee, Hip  Pain Orientation 1: Left  Pain Intervention(s) 1: Rest, Medication (see MAR)  Post Session:  5/10     THERAPEUTIC EXERCISE: (45 minutes):  Exercises per grid below to improve mobility, strength, balance and coordination. Required minimal verbal and manual cues to promote proper body alignment, promote proper body posture and promote proper body mechanics. Progressed resistance, range, repetitions and complexity of movement as indicated. Date:  6/27/2017   Activity/Exercise Parameters   Nu-step 7 minutes   Level 5   Step up and overs 8 inch  10 reps  B LE   Wall squats 15 reps   Slow march in edwards 2 laps   Kneeling on hilo table with weight shifts 5 minutes   Leg press 160 pounds  20 reps   Leg extension 60 pounds  20 reps   Leg curl 60 pounds  20 reps   Eliptical 3 minutes   Calf stretch on slant board Gastroc and soleus stretch  3 reps each  10 second hold     Treatment/Session Assessment:    · Response to Treatment:  Patient tolerated treatment without complaints of increased pain. · Compliance with Program/Exercises: Mostly compliant. · Recommendations/Intent for next treatment session: \"Next visit will focus on advancements to more challenging activities\".   Total Treatment Duration:  PT Patient Time In/Time Out  Time In: 1030  Time Out: [address], PT

## 2017-06-30 ENCOUNTER — HOSPITAL ENCOUNTER (OUTPATIENT)
Dept: PHYSICAL THERAPY | Age: 67
Discharge: HOME | End: 2017-06-30
Payer: MEDICARE

## 2017-06-30 PROCEDURE — G8979 MOBILITY GOAL STATUS: HCPCS

## 2017-06-30 PROCEDURE — G8980 MOBILITY D/C STATUS: HCPCS

## 2017-06-30 PROCEDURE — 97110 THERAPEUTIC EXERCISES: CPT

## 2017-06-30 NOTE — PROGRESS NOTES
Elfego Yeung  : 1950 [facility]  [address], [address] [address],[address], [address] [facility]  [phone]   [phone]            OUTPATIENT PHYSICAL THERAPY:Discharge Summary 2017    ICD-10: Treatment Diagnosis:   · Pain in left knee (M25.562)  · Stiffness of left knee, not elsewhere classified (M25.662)   · Low back pain (M54.5)  Precautions/Allergies:   Latex; Adhesive; Lortab [hydrocodone-acetaminophen]; and Oxycontin [oxycodone]   Fall Risk Score: 1 (? 5 = High Risk)  MD Orders: Evaluate and Treat MEDICAL/REFERRING DIAGNOSIS:  Low back pain [M54.5]  Other specified enthesopathies of right lower limb, excluding foot [M76.891]  Other specified enthesopathies of left lower limb, excluding foot [M76.892]  Pain in left knee [M25.562]  Pain in right knee [M25.561]   DATE OF ONSET: Chronic  REFERRING PHYSICIAN: Maria Del Carmen Heredia MD  RETURN PHYSICIAN APPOINTMENT: TBD     INITIAL ASSESSMENT:  Mr. Shanna Fang presents with improving mobility, strength, and pain in left knee and back. Patient has attended a total of 6 scheduled physical therapy visits including initial evaluation on 2017. Treatment has consisted of strengthening, stretching, manual, and modalities to improve overall mobility, pain, and performance with activities of daily living. After discussing with patient he to be discharged from therapy at this time secondary to feeling stronger and he will be out of town for several months. We will be happy to re-assess him with a change in his status and a new order from his doctor. Thank you for the opportunity to serve this patient. TREATMENT PLAN:  GOALS: (Goals have been discussed and agreed upon with patient.)  Short-Term Functional Goals: Time Frame: 3 weeks  1. Patient will be independent with home exercise program without exacerbation of symptoms or cueing needed--goal met.    2. Patient will be independent with correct sleeping positions and awareness/avoidance of aggravating positions without cueing needed--goal met. Discharge Goals: Time Frame: 8 weeks  1. Patient will be independent with all ADLs with minimal onset of knee and back pain and no deficits with daily tasks--goal met. 2. Patient will report no fear avoidance with social or recreational activities due to knee and back pain--goal met. 3. Patient will score greater than or equal to 60/80 on Lower Extremity Functional Scale with minimal effect of pain on patient's ability to manage every day life activities--goal met. Rehabilitation Potential For Stated Goals: Good            The information in this section was collected on 6/14/2017 (except where otherwise noted). HISTORY:   History of Present Injury/Illness (Reason for Referral):  Patient is well known to physical therapist and returns today with a new order from his surgeon. He reports that his left knee is still bothering him in an area right below his knee cap. He reports that he was having hip and back pain also, but since he got shots in his hips, that pain has decreased. He reports he still has the knee pain (left greater than right) and back pain. He reports that he is not as active as he once was when he was working. He reports he knows he needs to be more active, but it bothers his knee to walk for more than 45 minutes. He reports he would just like to be able to move around without pain in his knee. Past Medical History/Comorbidities:   Mr. Callie Nj  has a past medical history of Arthritis; CAD (coronary artery disease); Chronic pain; COPD; Diabetes ([address] Dyslipidemia; Heart failure ([address] Hypertension; N&V (nausea and vomiting) (4/23/2015); and Unspecified sleep apnea. He also has no past medical history of Aneurysm ([address] Arrhythmia; Asthma; Autoimmune disease ([address] Cancer ([address] Chronic kidney disease; Coagulation defects; Difficult intubation; GERD (gastroesophageal reflux disease);  Ill-defined condition; Liver disease; Malignant hyperthermia due to anesthesia; Morbid obesity ([facility] Pseudocholinesterase deficiency; Psychiatric disorder; PUD (peptic ulcer disease); Seizures ([facility] Stroke [facility]); Thromboembolus ([facility] Thyroid disease; or Unspecified adverse effect of anesthesia. Mr. Isabell Galvan  has a past surgical history that includes orthopaedic (Left, 1975); shoulder arthroscopy (Left, 2004); shoulder arthroscopy (Right, 2006); knee arthroscopy (Left, 2014); cardiac surg procedure unlist (2006); and cataract removal (Left, 2015). Social History/Living Environment:   Home Environment: Private residence  Living Alone: No  Support Systems: Family member(s), Friends \ neighbors, Spouse  Prior Level of Function/Work/Activity:  Independent  Dominant Side:         RIGHT  Personal Factors:          Sex:  male        Age:  73 y.o. Profession:  Retired  Current Medications:       Current Outpatient Prescriptions:     HYDROmorphone (DILAUDID) 2 mg tablet, Take 1-2 Tabs by mouth every four (4) hours as needed. Max Daily Amount: 24 mg., Disp: 60 Tab, Rfl: 0    losartan (COZAAR) 50 mg tablet, Take 50 mg by mouth daily. Indications: HYPERTENSION, Disp: , Rfl:     albuterol (PROVENTIL VENTOLIN) 2.5 mg /3 mL (0.083 %) nebulizer solution, , Disp: , Rfl: 6    BESIVANCE 0.6 % drps ophthalmic suspension, , Disp: , Rfl: 1    DUREZOL 0.05 % ophthalmic emulsion, , Disp: , Rfl: 1    ketorolac (ACULAR LS) 0.4 % drop, , Disp: , Rfl: 1    ADVAIR DISKUS 250-50 mcg/dose diskus inhaler, Take 1 Puff by inhalation two (2) times a day., Disp: , Rfl:     AXIRON 30 mg/actuation (1.5 mL) slpm, , Disp: , Rfl:     rosuvastatin (CRESTOR) 20 mg tablet, Take 20 mg by mouth nightly. Indications: MIXED HYPERLIPIDEMIA, Disp: , Rfl:     albuterol (PROVENTIL HFA, VENTOLIN HFA) 90 mcg/actuation inhaler, Take 2 Puffs by inhalation every four (4) hours as needed for Wheezing or Shortness of Breath. Take if needed DOS per anesthesia protocol.  Bring DOS Indications: CHRONIC OBSTRUCTIVE PULMONARY DISEASE, Disp: , Rfl:     carvedilol (COREG) 3.125 mg tablet, Take 3.125 mg by mouth two (2) times daily (with meals). Take DOS per anesthesia protocol., Disp: , Rfl:     tiotropium (SPIRIVA WITH HANDIHALER) 18 mcg inhalation capsule, Take 1 Cap by inhalation daily. Take DOS per anesthesia protocol. Indications: CHRONIC OBSTRUCTIVE PULMONARY DISEASE WITH BRONCHOSPASMS, Disp: , Rfl:    Date Last Reviewed:  6/30/2017    Number of Personal Factors/Comorbidities that affect the Plan of Care: 1-2: MODERATE COMPLEXITY   EXAMINATION:   Observation/Orthostatic Postural Assessment:          Posture Assessment: Rounded shoulders, Forward head   Palpation:          No tenderness to palpation noted. No bruising or swelling noted. ROM:  L LE Assessment (AROM):   · Hip Flexion: 80 degrees    · Hip Extension: 10 degrees    · Hip Abduction: 45 degrees    · Hip Adduction: 0 degrees    · Hip Internal rotation: 45  degrees    · Hip External rotation: 70 degrees    · Knee Flexion: 115 degrees    · Knee Extension: 0 degrees           Strength:  L LE Assessment (Strength - 6/30/2017):   · Hip Flexion: 4+/5 with manual muscle testing    · Hip Extension: 4+/5 with manual muscle testing    · Hip Abduction: 4+/5 with manual muscle testing    · Hip Adduction: 4+/5 with manual muscle testing    · Hip Internal rotation: 4/5 with manual muscle testing    · Hip External rotation: 4/5 with manual muscle testing    · Knee Flexion: 4+/5 with manual muscle testing    · Knee Extension: 4+/5 with manual muscle testing     Special Tests:          Normal tibio-femoral movement in B LE. Normal patellar mobility in all planes. Neurological Screen:        Dermatomes: Within normal limits        Reflexes:  Within normal limits  Functional Mobility:         Gait/Ambulation:  Within normal limits        Transfers:  Within normal limits   Body Structures Involved:  1. Joints  2.  Muscles Body Functions Affected:  1. Movement Related Activities and Participation Affected:  1. Mobility   Number of elements (examined above) that affect the Plan of Care: 4+: HIGH COMPLEXITY   CLINICAL PRESENTATION:   Presentation: Evolving clinical presentation with changing clinical characteristics: MODERATE COMPLEXITY   CLINICAL DECISION MAKING:   Outcome Measure: Tool Used: Lower Extremity Functional Scale (LEFS)  Score:  Initial: 32/80 Most Recent: 60/80 (Date: 6/30/2017 )   Interpretation of Score: 20 questions each scored on a 5 point scale with 0 representing \"extreme difficulty or unable to perform\" and 4 representing \"no difficulty\". The lower the score, the greater the functional disability. 80/80 represents no disability. Minimal detectable change is 9 points. Score 80 79-63 62-48 47-32 31-16 15-1 0   Modifier CH CI CJ CK CL CM CN     ? Mobility - Walking and Moving Around:     - CURRENT STATUS: CK - 40%-59% impaired, limited or restricted    - GOAL STATUS: CJ - 20%-39% impaired, limited or restricted    - D/C STATUS:  CJ - 20%-39% impaired, limited or restricted     Use of outcome tool(s) and clinical judgement create a POC that gives a: Questionable prediction of patient's progress: MODERATE COMPLEXITY            TREATMENT:   (In addition to Assessment/Re-Assessment sessions the following treatments were rendered)  Pre-treatment Symptoms/Complaints:  6/30/2017: Patient reports he is ready to go on vacation. Pain: Initial:   Pain Intensity 1: 5  Pain Location 1: Knee, Hip  Pain Orientation 1: Left  Pain Intervention(s) 1: Rest, Medication (see MAR)  Post Session:  5/10     THERAPEUTIC EXERCISE: (45 minutes):  Exercises per grid below to improve mobility, strength, balance and coordination. Required minimal verbal and manual cues to promote proper body alignment, promote proper body posture and promote proper body mechanics.   Progressed resistance, range, repetitions and complexity of movement as indicated. Date:  6/30/2017   Activity/Exercise Parameters   Nu-step 7 minutes   Level 5   Step up and overs 8 inch  10 reps  B LE   Wall squats 15 reps   Slow march in edwards 2 laps   Kneeling on hilo table with weight shifts 5 minutes   Leg press 160 pounds  20 reps   Leg extension 60 pounds  20 reps   Leg curl 60 pounds  20 reps   Eliptical 3 minutes   Calf stretch on slant board Gastroc and soleus stretch  3 reps each  10 second hold     Treatment/Session Assessment:    · Response to Treatment:  Patient tolerated treatment without complaints of increased pain. Patient to be discharged today. · Compliance with Program/Exercises: Compliant.   Total Treatment Duration:  PT Patient Time In/Time Out  Time In: 0945  Time Out: [address], [place]

## 2019-12-02 ENCOUNTER — HOME HEALTH ADMISSION (OUTPATIENT)
Dept: HOME HEALTH SERVICES | Facility: HOME HEALTH | Age: 69
End: 2019-12-02
Payer: MEDICARE

## 2019-12-02 ENCOUNTER — HOSPITAL ENCOUNTER (OUTPATIENT)
Dept: PHYSICAL THERAPY | Age: 69
Discharge: HOME | End: 2019-12-02
Payer: MEDICARE

## 2019-12-02 ENCOUNTER — HOSPITAL ENCOUNTER (OUTPATIENT)
Dept: SURGERY | Age: 69
Discharge: HOME | End: 2019-12-02
Payer: MEDICARE

## 2019-12-02 VITALS
TEMPERATURE: 97.5 F | WEIGHT: 245.5 LBS | DIASTOLIC BLOOD PRESSURE: 77 MMHG | BODY MASS INDEX: 33.25 KG/M2 | HEIGHT: 72 IN | SYSTOLIC BLOOD PRESSURE: 126 MMHG | HEART RATE: 59 BPM | OXYGEN SATURATION: 97 %

## 2019-12-02 LAB
ANION GAP SERPL CALC-SCNC: 4 MMOL/L (ref 7–16)
APTT PPP: 30 SEC (ref 24.7–39.8)
ATRIAL RATE: 45 BPM
BACTERIA SPEC CULT: NORMAL
BASOPHILS # BLD: 0.1 K/UL (ref 0–0.2)
BASOPHILS NFR BLD: 1 % (ref 0–2)
BUN SERPL-MCNC: 13 MG/DL (ref 8–23)
CALCIUM SERPL-MCNC: 9.4 MG/DL (ref 8.3–10.4)
CALCULATED P AXIS, ECG09: 76 DEGREES
CALCULATED R AXIS, ECG10: 72 DEGREES
CALCULATED T AXIS, ECG11: 74 DEGREES
CHLORIDE SERPL-SCNC: 107 MMOL/L (ref 98–107)
CO2 SERPL-SCNC: 28 MMOL/L (ref 21–32)
CREAT SERPL-MCNC: 1.11 MG/DL (ref 0.8–1.5)
CRP SERPL-MCNC: 1 MG/DL (ref 0–0.9)
DIAGNOSIS, 93000: NORMAL
DIFFERENTIAL METHOD BLD: ABNORMAL
EOSINOPHIL # BLD: 0.2 K/UL (ref 0–0.8)
EOSINOPHIL NFR BLD: 3 % (ref 0.5–7.8)
ERYTHROCYTE [DISTWIDTH] IN BLOOD BY AUTOMATED COUNT: 13.9 % (ref 11.9–14.6)
ERYTHROCYTE [SEDIMENTATION RATE] IN BLOOD: 38 MM/HR (ref 0–20)
EST. AVERAGE GLUCOSE BLD GHB EST-MCNC: 131 MG/DL
GLUCOSE SERPL-MCNC: 101 MG/DL (ref 65–100)
HBA1C MFR BLD: 6.2 %
HCT VFR BLD AUTO: 40 % (ref 41.1–50.3)
HGB BLD-MCNC: 12.5 G/DL (ref 13.6–17.2)
IMM GRANULOCYTES # BLD AUTO: 0 K/UL (ref 0–0.5)
IMM GRANULOCYTES NFR BLD AUTO: 0 % (ref 0–5)
INR PPP: 0.9
LYMPHOCYTES # BLD: 1.5 K/UL (ref 0.5–4.6)
LYMPHOCYTES NFR BLD: 19 % (ref 13–44)
MCH RBC QN AUTO: 28.6 PG (ref 26.1–32.9)
MCHC RBC AUTO-ENTMCNC: 31.3 G/DL (ref 31.4–35)
MCV RBC AUTO: 91.5 FL (ref 79.6–97.8)
MONOCYTES # BLD: 0.6 K/UL (ref 0.1–1.3)
MONOCYTES NFR BLD: 7 % (ref 4–12)
NEUTS SEG # BLD: 5.7 K/UL (ref 1.7–8.2)
NEUTS SEG NFR BLD: 70 % (ref 43–78)
NRBC # BLD: 0 K/UL (ref 0–0.2)
P-R INTERVAL, ECG05: 136 MS
PLATELET # BLD AUTO: 287 K/UL (ref 150–450)
PMV BLD AUTO: 10.8 FL (ref 9.4–12.3)
POTASSIUM SERPL-SCNC: 4.4 MMOL/L (ref 3.5–5.1)
PROTHROMBIN TIME: 12.6 SEC (ref 11.7–14.5)
Q-T INTERVAL, ECG07: 456 MS
QRS DURATION, ECG06: 100 MS
QTC CALCULATION (BEZET), ECG08: 394 MS
RBC # BLD AUTO: 4.37 M/UL (ref 4.23–5.6)
SERVICE CMNT-IMP: NORMAL
SODIUM SERPL-SCNC: 139 MMOL/L (ref 136–145)
VENTRICULAR RATE, ECG03: 45 BPM
WBC # BLD AUTO: 8.1 K/UL (ref 4.3–11.1)

## 2019-12-02 PROCEDURE — 86140 C-REACTIVE PROTEIN: CPT

## 2019-12-02 PROCEDURE — 80048 BASIC METABOLIC PNL TOTAL CA: CPT

## 2019-12-02 PROCEDURE — 85730 THROMBOPLASTIN TIME PARTIAL: CPT

## 2019-12-02 PROCEDURE — 85610 PROTHROMBIN TIME: CPT

## 2019-12-02 PROCEDURE — 83036 HEMOGLOBIN GLYCOSYLATED A1C: CPT

## 2019-12-02 PROCEDURE — 85652 RBC SED RATE AUTOMATED: CPT

## 2019-12-02 PROCEDURE — 85025 COMPLETE CBC W/AUTO DIFF WBC: CPT

## 2019-12-02 PROCEDURE — 87641 MR-STAPH DNA AMP PROBE: CPT

## 2019-12-02 PROCEDURE — 36415 COLL VENOUS BLD VENIPUNCTURE: CPT

## 2019-12-02 PROCEDURE — 97161 PT EVAL LOW COMPLEX 20 MIN: CPT

## 2019-12-02 PROCEDURE — 93005 ELECTROCARDIOGRAM TRACING: CPT | Performed by: ANESTHESIOLOGY

## 2019-12-02 PROCEDURE — 77030027138 HC INCENT SPIROMETER -A

## 2019-12-02 RX ORDER — ASPIRIN 81 MG/1
81 TABLET ORAL DAILY
COMMUNITY
End: 2019-12-18

## 2019-12-02 RX ORDER — LOSARTAN POTASSIUM 100 MG/1
100 TABLET ORAL DAILY
COMMUNITY
End: 2020-09-29 | Stop reason: SDUPTHER

## 2019-12-02 RX ORDER — BISMUTH SUBSALICYLATE 262 MG
1 TABLET,CHEWABLE ORAL DAILY
COMMUNITY

## 2019-12-02 RX ORDER — ATORVASTATIN CALCIUM 40 MG/1
40 TABLET, FILM COATED ORAL
COMMUNITY
End: 2020-09-29 | Stop reason: SDUPTHER

## 2019-12-02 RX ORDER — BUDESONIDE 0.5 MG/2ML
500 INHALANT ORAL EVERY 12 HOURS
COMMUNITY
End: 2020-09-29 | Stop reason: SDUPTHER

## 2019-12-02 RX ORDER — FORMOTEROL FUMARATE 20 UG/2ML
20 SOLUTION RESPIRATORY (INHALATION) EVERY 12 HOURS
COMMUNITY
End: 2020-09-29 | Stop reason: SDUPTHER

## 2019-12-02 NOTE — PROGRESS NOTES
Ron Toth  : [birth year](52 y.o.) [facility]  [address], [address]  [phone]       Physical Therapy Prehab Plan of Treatment and Evaluation Summary:2019    ICD-10: Treatment Diagnosis:   · Pain in Left Knee (M25.562)  · Stiffness of Left Knee, Not elsewhere classified (M25.662)  · Difficulty in walking, Not elsewhere classified (R26.2)  · Other abnormalities of gait and mobility (R26.89)  Precautions/Allergies:   Latex; Adhesive; Lortab [hydrocodone-acetaminophen]; and Oxycontin [oxycodone]  MEDICAL/REFERRING DIAGNOSIS:  Unspecified complication of internal orthopedic prosthetic device, implant and graft, initial encounter [T84. 9XXA]  REFERRING PHYSICIAN: Brenda Gunter MD  DATE OF SURGERY: 19    Assessment:   Comments:  Pain in left knee with decreased independence with functional mobility. Pt plans to return home following hospital stay. Pt motivated and prepared for surgery. Pt states his initial left knee was replaced four years ago. PROBLEM LIST (Impacting functional limitations):  Mr. Breann He presents with the following left lower extremity(s) problems:  1. Transfers  2. Gait  3. Strength  4. Range of Motion  5. Pain   INTERVENTIONS PLANNED:  1. Home Exercise Program  2. Educational Discussion      TREATMENT PLAN: Effective Dates: 2019 TO 2019. Frequency/Duration: Patient to continue to perform home exercise program at least twice per day up until his surgery. GOALS: (Goals have been discussed and agreed upon with patient.)  Discharge Goals: Time Frame: 1 Day  1. Patient will demonstrate independence with a home exercise program designed to increase strength, range of motion and pain control to minimize functional deficits and optimize patient for total joint replacement.   Rehabilitation Potential For Stated Goals: Good  Regarding Milagros Chavez's therapy, I certify that the treatment plan above will be carried out by a therapist or under their direction. Thank you for this referral,  Radha Koch, PT               HISTORY:   Present Symptoms:  Pain Intensity 1: 1  Pain Location 1: Knee  Pain Orientation 1: Left   History of Present Injury/Illness (Reason for Referral):  Medical/Referring Diagnosis: Unspecified complication of internal orthopedic prosthetic device, implant and graft, initial encounter [T84. 9XXA]   Past Medical History/Comorbidities:   Mr. Israel Baeza  has a past medical history of Arthritis, BMI 33.0-33.9,adult, CAD (coronary artery disease), Chronic pain, COPD, Diabetes ([address] Dyslipidemia, Former smoker, Heart failure ([address] Hypertension, Lung cancer ([address] N&V (nausea and vomiting) (04/23/2015), and Unspecified sleep apnea. He also has no past medical history of Adverse effect of anesthesia, Aneurysm ([address] Arrhythmia, Asthma, Autoimmune disease ([address] Cancer ([address] Chronic kidney disease, Coagulation defects, Coagulation disorder ([address] Difficult intubation, Endocarditis, GERD (gastroesophageal reflux disease), Ill-defined condition, Liver disease, Malignant hyperthermia due to anesthesia, Morbid obesity ([address] Nicotine vapor product user, Non-nicotine vapor product user, Pseudocholinesterase deficiency, Psychiatric disorder, PUD (peptic ulcer disease), Rheumatic fever, Seizures ([address] Stroke ([address] Thromboembolus ([address] Thyroid disease, or Unspecified adverse effect of anesthesia. Mr. Israel Baeza  has a past surgical history that includes hx orthopaedic (Left, 1975); hx shoulder arthroscopy (Left, 2004); hx shoulder arthroscopy (Right, 2010); hx knee arthroscopy (Left, 2014); pr cardiac surg procedure unlist (2006); hx cataract removal (Left, 2015); hx knee replacement (Left, 04/21/2015); hx colonoscopy (2017); and hx retinal detachment repair (Left).   Social History/Living Environment:   Home Environment: Private residence  # Steps to Enter: 4  One/Two Story Residence: One story  Living Alone: No  Support Systems: Spouse/Significant Other/Partner  Patient Expects to be Discharged to[de-identified] Private residence  Current DME Used/Available at Home: Stephanie Portage, straight, Walker, rolling, Commode, bedside, Crutches, Shower chair  Tub or Shower Type: Shower  Work/Activity:  retired  Dominant Side:  LEFT  Current Medications:  See Pre-assessment nursing note   Number of Personal Factors/Comorbidities that affect the Plan of Care: 0: LOW COMPLEXITY   EXAMINATION:   ADLs (Current Functional Status):   Ambulation:  [x] Independent  [] Walk Indoors Only  [] Walk Outdoors  [] Use Assistive Device  [] Use Wheelchair Only Dressing:  [x] [address] from Someone for:  [] Sock/Shoes  [] Pants  [] Everything   Bathing/Showering:   [x] Independent  [] Requires Assistance from Someone  [] [address]:  [x] Routine house and yard work  [] Light Housework Only  [] None   Observation/Orthostatic Postural Assessment:   Within defined limits   ROM/Flexibility:   Gross Assessment: Yes  AROM: Generally decreased, functional                LLE AROM  L Knee Flexion: 110  L Knee Extension: 5      RLE Assessment  RLE Assessment (WDL): Within defined limits   Strength:   Gross Assessment: Yes  Strength: Generally decreased, functional                  Functional Mobility:    Gross Assessment: Yes  Coordination: Generally decreased, functional    Gait Description (WDL): Within defined limits  Stand to Sit: Independent  Sit to Stand: Independent  Distance (ft): 900 Feet (ft)  Ambulation - Level of Assistance: Independent  Gait Abnormalities: Antalgic          Balance:    Sitting: Intact  Standing: Intact   Body Structures Involved:  1. Bones  2. Joints  3. Muscles  4. Ligaments Body Functions Affected:  1. Neuromusculoskeletal  2. Movement Related Activities and Participation Affected:  1.  Mobility   Number of elements that affect the Plan of Care: 4+: HIGH COMPLEXITY   CLINICAL PRESENTATION:   Presentation: Stable and uncomplicated: LOW COMPLEXITY   CLINICAL DECISION MAKING:   Outcome Measure: Tool Used: Lower Extremity Functional Scale (LEFS)  Score:  Initial: 45/80 Most Recent: X/80 (Date: -- )   Interpretation of Score: 20 questions each scored on a 5 point scale with 0 representing \"extreme difficulty or unable to perform\" and 4 representing \"no difficulty\". The lower the score, the greater the functional disability. 80/80 represents no disability. Minimal detectable change is 9 points. Medical Necessity:   · Mr. David Mast is expected to optimize his lower extremity strength and ROM in preparation for joint replacement surgery. Reason for Services/Other Comments:  · Achieve baseline assesment of musculoskeletal system, functional mobility and home environment. , educate in PT HEP in preparation for surgery, educate in hospital plan of care. Use of outcome tool(s) and clinical judgement create a POC that gives a: Clear prediction of patient's progress: LOW COMPLEXITY   TREATMENT:   Treatment/Session Assessment:  Patient was instructed in PT- HEP to increase strength and ROM in LEs. Answered all questions. · Post session pain:  1  · Compliance with Program/Exercises: compliant most of the time.   Total Treatment Duration:  PT Patient Time In/Time Out  Time In: 1215  Time Out: [address], PT

## 2019-12-02 NOTE — PERIOP NOTES
PLEASE CONTINUE TAKING ALL PRESCRIPTION MEDICATIONS UP TO THE DAY BEFORE SURGERY ON 12/16/19 UNLESS OTHERWISE DIRECTED BELOW. DISCONTINUE all vitamins and supplements NOW. Home Medications to take  the day of surgery (12/17/19)   CARVEDILOL  81 MG ASPIRIN   BUDESONIDE NEBULIZER  FOROMIST NEBULIZER    ALBUTEROL INHALER IF NEEDED     Home Medications   to Hold              Comments   BRING: INHALER, C-PAP, FOROMIST, YUPELRI              Please do not bring home medications with you on the day of surgery unless otherwise directed by your nurse. If you are instructed to bring home medications, please give them to your nurse as they will be administered by the nursing staff. If you have any questions, please call [address] [place] [address] [phone] or [facility] [phone].

## 2019-12-02 NOTE — PERIOP NOTES
Dr. Anthony Blackwood,     Your patient was seen in [place] [address] today. I am attaching the results of the labs for your to review and follow-up with if necessary. If you would like to order additional labs or tests please enter into [facility] or fax to [phone]. Please do not respond to this message as my mailbox is not monitored. You may call [phone] with questions or concerns. Recent Results (from the past 12 hour(s))   CBC WITH AUTOMATED DIFF    Collection Time: 12/02/19 12:40 PM   Result Value Ref Range    WBC 8.1 4.3 - 11.1 K/uL    RBC 4.37 4.23 - 5.6 M/uL    HGB 12.5 (L) 13.6 - 17.2 g/dL    HCT 40.0 (L) 41.1 - 50.3 %    MCV 91.5 79.6 - 97.8 FL    MCH 28.6 26.1 - 32.9 PG    MCHC 31.3 (L) 31.4 - 35.0 g/dL    RDW 13.9 11.9 - 14.6 %    PLATELET 319 882 - 472 K/uL    MPV 10.8 9.4 - 12.3 FL    ABSOLUTE NRBC 0.00 0.0 - 0.2 K/uL    DF AUTOMATED      NEUTROPHILS 70 43 - 78 %    LYMPHOCYTES 19 13 - 44 %    MONOCYTES 7 4.0 - 12.0 %    EOSINOPHILS 3 0.5 - 7.8 %    BASOPHILS 1 0.0 - 2.0 %    IMMATURE GRANULOCYTES 0 0.0 - 5.0 %    ABS. NEUTROPHILS 5.7 1.7 - 8.2 K/UL    ABS. LYMPHOCYTES 1.5 0.5 - 4.6 K/UL    ABS. MONOCYTES 0.6 0.1 - 1.3 K/UL    ABS. EOSINOPHILS 0.2 0.0 - 0.8 K/UL    ABS. BASOPHILS 0.1 0.0 - 0.2 K/UL    ABS. IMM.  GRANS. 0.0 0.0 - 0.5 K/UL   HEMOGLOBIN A1C WITH EAG    Collection Time: 12/02/19 12:40 PM   Result Value Ref Range    Hemoglobin A1c 6.2 %    Est. average glucose 312 mg/dL   METABOLIC PANEL, BASIC    Collection Time: 12/02/19 12:40 PM   Result Value Ref Range    Sodium 139 136 - 145 mmol/L    Potassium 4.4 3.5 - 5.1 mmol/L    Chloride 107 98 - 107 mmol/L    CO2 28 21 - 32 mmol/L    Anion gap 4 (L) 7 - 16 mmol/L    Glucose 101 (H) 65 - 100 mg/dL    BUN 13 8 - 23 MG/DL    Creatinine 1.11 0.8 - 1.5 MG/DL    GFR est AA >60 >60 ml/min/1.73m2    GFR est non-AA >60 >60 ml/min/1.73m2    Calcium 9.4 8.3 - 10.4 MG/DL   PROTHROMBIN TIME + INR    Collection Time: 12/02/19 12:40 PM   Result Value Ref Range    Prothrombin time 12.6 11.7 - 14.5 sec    INR 0.9     PTT    Collection Time: 12/02/19 12:40 PM   Result Value Ref Range    aPTT 30.0 24.7 - 39.8 SEC   SED RATE, AUTOMATED    Collection Time: 12/02/19 12:40 PM   Result Value Ref Range    Sed rate, automated 38 (H) 0 - 20 mm/hr   C REACTIVE PROTEIN, QT    Collection Time: 12/02/19 12:40 PM   Result Value Ref Range    C-Reactive protein 1.0 (H) 0.0 - 0.9 mg/dL   EKG, 12 LEAD, INITIAL    Collection Time: 12/02/19  2:36 PM   Result Value Ref Range    Ventricular Rate 45 BPM    Atrial Rate 45 BPM    P-R Interval 136 ms    QRS Duration 100 ms    Q-T Interval 456 ms    QTC Calculation (Bezet) 394 ms    Calculated P Axis 76 degrees    Calculated R Axis 72 degrees    Calculated T Axis 74 degrees    Diagnosis       Sinus bradycardia  Cannot rule out Anterior infarct , age undetermined  Abnormal ECG  When compared with ECG of 23-APR-2015 08:55,  Premature supraventricular complexes are no longer Present  Vent.  rate has decreased BY  30 BPM  QT has shortened

## 2019-12-02 NOTE — PROGRESS NOTES
SW met with pt in Prehab to discuss Left TKA revision scheduled for 12/17/19. Pt reports he had Left TKA about 4 years ago. Pt plans to return home with HHPT. Pt resides in [facility] and is agreeable to [facility]. [facility] referral completed. Pt states he has RW, BSC cane and ice man at home. No additional needs identified at this time.    Ofelia Chua

## 2019-12-02 NOTE — PROGRESS NOTES
19 1200   Oxygen Therapy   O2 Sat (%) 98 %   Pulse via Oximetry 68 beats per minute   O2 Device Room air   Pre-Treatment   Cough Productive; Congested   Sputum amount Moderate   Sputum color/odor Clear   Breath Sounds Bilateral Diminished   Pre FEV1 (liters) 0.8 liters   % Predicted 23  (BASELINE FEV1 IS 37)   Incentive Spirometry Treatment   Actual Volume (ml) 3000 ml     Initial respiratory Assessment completed with pt. Pt was interviewed and evaluated in Joint camp prior to surgery. Patient ID:  Aurea Amaral  [id number]  53 y.o.  1950  Surgeon: Dr. Kelli Phalen  Date of Surgery: 2019  Procedure: Total Left Knee Arthroplasty  Primary Care Physician: Jennifer Lafleur [phone]  Specialists: Suresh Cote- PULMONOLOGIST AT [facility]- PT SAW 10/30/2019 BUT NOT FOR SURGICAL CLEARANCE                                 Pt instructed in the use of Incentive Spirometry. Pt instructed to bring Incentive Spirometer back on date of surgery & to start using Is upon return to pt room. Pt taught proper cough technique    History of smokin PPD FOR 50 YEARS                                                      Quit date:       1.5 YEARS AGO AFTER LUNG CANCER DIAGNOSIS    Secondhand smoke:PARENTS      Past procedures with Oxygen desaturation:DENIES    Past Medical History:   Diagnosis Date    Arthritis     osteo    BMI 33.0-33.9,adult     CAD (coronary artery disease)     minimal nonobstructive CAD  cath  [facility]; [facility] by [facility]     Chronic pain     left knee    COPD     rescue inhaler (uses about 2-3 times per month); nebulizer daily; last hospitalized 2018; followed by the [facility])     pre-diabetes; A1C-6.2 on 19    Dyslipidemia     controlled with med    Former smoker     Heart failure ([address]     dilated cardiomyopathy, nonischemic.      History of echocardiogram 2018    EF=50-54%, focal aortic valve calcification, mild tricuspid valve regurgitation     Hypertension     controlled with meds    Lung cancer ([facility] [address]     treated with radiation     N&V (nausea and vomiting) 04/23/2015    denies (noted 12/02/19)    Unspecified sleep apnea     uses c pap- to bring DOS                                    HX OF PNA SEVERAL TIMES, COPD - EMPHYSEMA , RIKI- CPAP                                                                                                              Respiratory history:DENIES SOB                                                                 Respiratory meds:  PERFOROMIST BY NEB BID, BUDESONIDE BID, ALBUTEROL MDI PRN BID,     MDI INSTRUCTIONS GIVEN WITH SPACER. PT SELF ADMINISTERED 2 PUFFS AT PRESENT      PT HAS FLUTTER VALVE AT HOME. PT TO CONTACT DR Mary Miles WITH FEV1 VALUE & ASK FOR STEROID                             FAMILY PRESENT:                                                              NO                                        PAST SLEEP STUDY:        YES                   HX OF RIKI:                        YES                                                        RIKI assessment:                                                                                                PHYSICAL EXAM   Body mass index is 33.3 kg/m².    Visit Vitals  /77 (BP 1 Location: Left arm, BP Patient Position: At rest;Sitting)   Pulse (!) 59   Temp 97.5 °F (36.4 °C)   Ht 6' (1.829 m)   Wt 111.4 kg (245 lb 8 oz)   SpO2 97%   BMI 33.30 kg/m²     Neck circumference:  42.5    cm    Loud snoring:        YES                                 Witnessed apnea or wakening gasping or choking:,                                                                                                           APNEA    Awakens with headaches:                                                    Morning or daytime tiredness/ sleepiness:                                                                                                           TIRED   Dry mouth or sore throat in morning:                YES                                                                       Javed stage:  3    SACS score:58      Stop Bang   STOP-BANG  Does the patient snore loudly (louder than talking or loud enough to be heard through closed doors)?: Yes  Does the patient often feel tired, fatigued, or sleepy during the daytime, even after a \"good\" night's sleep?: Yes  Has anyone ever observed the patient stop breathing during their sleep? : Yes  Does the patient have or are they being treated for high blood pressure?: Yes  Is the patient's BMI greater than 35?: No  Is your neck circumference greater than 17 inches (Male) or 16 inches (Female)?: No  Is the patient older than 48?: Yes  Is the patient male?: Yes  RIKI Score: 6  Has the patient been referred to Sleep Medicine?: No  Has the patient previously been diagnosed with Obstructive Sleep Apnea?: Yes  Treated or Untreated?: Treated                            CPAP:                                                          HOME CPAP    PULMICORT BID  ALBUTEROL NEB TID                       Referrals:    Pt. Phone Number:

## 2019-12-02 NOTE — PERIOP NOTES
Patient verified name and . Order for consent found in EHR and matches case posting; patient verified. Type 3 surgery, walk in assessment complete. Labs per surgeon: CBC, BMP, PT/PTT, HGB-A1C, SED RATE, CRP  ; results pending. Labs per anesthesia protocol: included in surgeons orders. EKG: done today; abnormal; anesthesia to review. EKG dated 04/23/15 printed for comparison. Also called [facility] and received EKG dated 18 for comparison. Printed from EHR for anesthesia reference: stress test (18), echo (18), and [facility] cardiology office note (10/03/19). Most recent Pulmonary office note (10/30/19) and PCP office note (10/08/19) available in EHR for reference if needed under care everywhere tab. MRSA/MSSA swab collected; pharmacy to review and dose antibiotic as appropriate. Hospital approved surgical skin cleanser and instructions to return bottle on DOS given per hospital policy. Patient provided with handouts including Guide to Surgery, Pain Management, Hand Hygiene, Blood Transfusion Education, and [place] Anesthesia Brochure. Patient answered medical/surgical history questions at their best of ability. All prior to admission medications documented in [facility] Care. Original medication prescription bottles NOT visualized during patient appointment. Patient instructed to hold all vitamins 7 days prior to surgery and NSAIDS 5 days prior to surgery. Prescription medications to hold: none. Patient instructed to continue previous medications as prescribed prior to surgery and to take the following medications the day of surgery according to anesthesia guidelines with a small sip of water: carvedilol, 81 mg aspirin, budesonide neb, foromist neb, albuterol inhaler if needed. Instructed to bring dos: inhaler, c-pap, foromist, yupelri. Patient teach back successful and patient demonstrates knowledge of instruction.

## 2019-12-02 NOTE — PERIOP NOTES
Terra Lainez MD    Your patient recently had labs drawn during a hospital appointment due to an upcoming surgery. The results are attached. If you have any questions or concerns please reach out to your patient for a follow-up in your office. Please do not respond to this message as my mailbox is not monitored. You may call [phone] with questions or concerns. Recent Results (from the past 12 hour(s))   CBC WITH AUTOMATED DIFF    Collection Time: 12/02/19 12:40 PM   Result Value Ref Range    WBC 8.1 4.3 - 11.1 K/uL    RBC 4.37 4.23 - 5.6 M/uL    HGB 12.5 (L) 13.6 - 17.2 g/dL    HCT 40.0 (L) 41.1 - 50.3 %    MCV 91.5 79.6 - 97.8 FL    MCH 28.6 26.1 - 32.9 PG    MCHC 31.3 (L) 31.4 - 35.0 g/dL    RDW 13.9 11.9 - 14.6 %    PLATELET 120 588 - 076 K/uL    MPV 10.8 9.4 - 12.3 FL    ABSOLUTE NRBC 0.00 0.0 - 0.2 K/uL    DF AUTOMATED      NEUTROPHILS 70 43 - 78 %    LYMPHOCYTES 19 13 - 44 %    MONOCYTES 7 4.0 - 12.0 %    EOSINOPHILS 3 0.5 - 7.8 %    BASOPHILS 1 0.0 - 2.0 %    IMMATURE GRANULOCYTES 0 0.0 - 5.0 %    ABS. NEUTROPHILS 5.7 1.7 - 8.2 K/UL    ABS. LYMPHOCYTES 1.5 0.5 - 4.6 K/UL    ABS. MONOCYTES 0.6 0.1 - 1.3 K/UL    ABS. EOSINOPHILS 0.2 0.0 - 0.8 K/UL    ABS. BASOPHILS 0.1 0.0 - 0.2 K/UL    ABS. IMM.  GRANS. 0.0 0.0 - 0.5 K/UL   HEMOGLOBIN A1C WITH EAG    Collection Time: 12/02/19 12:40 PM   Result Value Ref Range    Hemoglobin A1c 6.2 %    Est. average glucose 180 mg/dL   METABOLIC PANEL, BASIC    Collection Time: 12/02/19 12:40 PM   Result Value Ref Range    Sodium 139 136 - 145 mmol/L    Potassium 4.4 3.5 - 5.1 mmol/L    Chloride 107 98 - 107 mmol/L    CO2 28 21 - 32 mmol/L    Anion gap 4 (L) 7 - 16 mmol/L    Glucose 101 (H) 65 - 100 mg/dL    BUN 13 8 - 23 MG/DL    Creatinine 1.11 0.8 - 1.5 MG/DL    GFR est AA >60 >60 ml/min/1.73m2    GFR est non-AA >60 >60 ml/min/1.73m2    Calcium 9.4 8.3 - 10.4 MG/DL   PROTHROMBIN TIME + INR    Collection Time: 12/02/19 12:40 PM   Result Value Ref Range Prothrombin time 12.6 11.7 - 14.5 sec    INR 0.9     PTT    Collection Time: 12/02/19 12:40 PM   Result Value Ref Range    aPTT 30.0 24.7 - 39.8 SEC   SED RATE, AUTOMATED    Collection Time: 12/02/19 12:40 PM   Result Value Ref Range    Sed rate, automated 38 (H) 0 - 20 mm/hr   C REACTIVE PROTEIN, QT    Collection Time: 12/02/19 12:40 PM   Result Value Ref Range    C-Reactive protein 1.0 (H) 0.0 - 0.9 mg/dL   EKG, 12 LEAD, INITIAL    Collection Time: 12/02/19  2:36 PM   Result Value Ref Range    Ventricular Rate 45 BPM    Atrial Rate 45 BPM    P-R Interval 136 ms    QRS Duration 100 ms    Q-T Interval 456 ms    QTC Calculation (Bezet) 394 ms    Calculated P Axis 76 degrees    Calculated R Axis 72 degrees    Calculated T Axis 74 degrees    Diagnosis       Sinus bradycardia  Cannot rule out Anterior infarct , age undetermined  Abnormal ECG  When compared with ECG of 23-APR-2015 08:55,  Premature supraventricular complexes are no longer Present  Vent.  rate has decreased BY  30 BPM  QT has shortened

## 2019-12-03 NOTE — PERIOP NOTES
Dr. Irais Agarwal, anesthesia, reviewed and ok'd for surgery ekg 4/23/15, 12/2/19, 6/6/18; stress 1/3/18; echo 12/14/18 and card note 10/3/19.

## 2019-12-16 ENCOUNTER — ANESTHESIA EVENT (OUTPATIENT)
Dept: SURGERY | Age: 69
DRG: 467 | End: 2019-12-16
Payer: MEDICARE

## 2019-12-16 RX ORDER — ALBUTEROL SULFATE 0.83 MG/ML
2.5 SOLUTION RESPIRATORY (INHALATION)
Status: CANCELLED | OUTPATIENT
Start: 2019-12-16

## 2019-12-16 NOTE — H&P
H&P    Patient ID:  Shakira Woodard  [id number]  31 y.o.  1950  Surgeon:  Valentina Mcwilliams MD  Date of Surgery: * No surgery date entered *  Procedure: Left Knee Total Arthroplasty revision  Primary Care Physician: Mia Duran MD        Subjective:  Shakira Woodard is a 77 y.o. WHITE OR  male who presents with Left Knee pain. He had a left TKA a few years ago. He has history of Left Knee pain for several months following a fall. Symptoms worse with walking long distances and relieved with rest. Conservative treatment consisting of  activity modification and injections have not helped. The patient lives with their family. The patients goal after surgery is improved pain and function. Past Medical History:   Diagnosis Date    Arthritis     osteo    BMI 33.0-33.9,adult     CAD (coronary artery disease)     minimal nonobstructive CAD  cath 8/06 at [facility]; followed by [facility]iology     Chronic pain     left knee    COPD     rescue inhaler (uses about 2-3 times per month); nebulizer daily; last hospitalized 02/2018; followed by the [facility])     pre-diabetes; A1C-6.2 on 12/02/19    Dyslipidemia     controlled with med    Former smoker     Heart failure ([address]     dilated cardiomyopathy, nonischemic.      History of echocardiogram 12/14/2018    EF=50-54%, focal aortic valve calcification, mild tricuspid valve regurgitation     Hypertension     controlled with meds    Lung cancer ([address]     treated with radiation     N&V (nausea and vomiting) 04/23/2015    denies (noted 12/02/19)    Unspecified sleep apnea     uses c pap- to bring DOS      Past Surgical History:   Procedure Laterality Date    CARDIAC SURG PROCEDURE UNLIST  2006    at [facility] (told normal per pt)    HX CATARACT REMOVAL Left 2015    HX COLONOSCOPY  2017    HX KNEE ARTHROSCOPY Left 2014    x 2    HX KNEE REPLACEMENT Left 04/21/2015    HX ORTHOPAEDIC Left 1975    ankle    HX RETINAL DETACHMENT REPAIR Left     HX SHOULDER ARTHROSCOPY Left     HX SHOULDER ARTHROSCOPY Right 2010     Family History   Problem Relation Age of Onset    Heart Disease Maternal Uncle     Heart Disease Paternal Uncle     Heart Disease Maternal Grandfather     Heart Disease Paternal Grandfather     Diabetes Mother     Cancer Father     Hypertension Father     Diabetes Maternal Grandmother       Social History     Tobacco Use    Smoking status: Former Smoker     Packs/day: 1.00     Years: 48.00     Pack years: 48.00     Last attempt to quit: 2017     Years since quittin.9    Smokeless tobacco: Never Used   Substance Use Topics    Alcohol use: Yes     Comment: occasional       Prior to Admission medications    Medication Sig Start Date End Date Taking? Authorizing Provider   losartan (COZAAR) 100 mg tablet Take 100 mg by mouth daily. Indications: high blood pressure    Provider, Historical   atorvastatin (LIPITOR) 40 mg tablet Take 40 mg by mouth nightly. Indications: high cholesterol    Provider, Historical   aspirin delayed-release 81 mg tablet Take 81 mg by mouth daily. Take / use AM day of surgery  per anesthesia protocols. Provider, Historical   revefenacin (YUPELRI) 175 mcg/3 mL nebu nebulizer solution 175 mcg by Nebulization route daily (after lunch). Indications: a chronic lung disease where the airways become partially blocked with mucus called COPD    Provider, Historical   budesonide (PULMICORT) 0.5 mg/2 mL nbsp 500 mcg by Nebulization route every twelve (12) hours. Take / use AM day of surgery  per anesthesia protocols. Indications: worsening of debilitating chronic lung disease called COPD    Provider, Historical   formoterol (PERFOROMIST) 20 mcg/2 mL nebu neb solution 20 mcg by Nebulization route every twelve (12) hours. Take / use AM day of surgery  per anesthesia protocols. Provider, Historical   multivitamin (ONE A DAY) tablet Take 1 Tab by mouth daily.     Provider, Historical   albuterol (PROVENTIL HFA, VENTOLIN HFA) 90 mcg/actuation inhaler Take 2 Puffs by inhalation every four (4) hours as needed for Wheezing or Shortness of Breath. Take if needed DOS per anesthesia protocol. Bring DOS  Indications: CHRONIC OBSTRUCTIVE PULMONARY DISEASE    Provider, Historical   carvedilol (COREG) 3.125 mg tablet Take 3.125 mg by mouth every twelve (12) hours. Take / use AM day of surgery  per anesthesia protocols. Provider, Historical     Allergies   Allergen Reactions    Latex Rash    Adhesive Other (comments)     Dressing on shoulder, following surgery 2010, caused redness    Lortab [Hydrocodone-Acetaminophen] Nausea and Vomiting and Other (comments)     hallucinations    Oxycontin [Oxycodone] Nausea and Vomiting and Other (comments)     hallucinations        REVIEW OF SYSTEMS:  CONSTITUTIONAL: Denies fever, decreased appetite, weight loss/gain, night sweats or fatigue. HEENT: Denies vision or hearing changes. denies glasses. denies hearing aids. CARDIAC: Denies CP, palpitations, rheumatic fever, murmur, peripheral edema, carotid artery disease or syncopal episodes. RESPIRATORY: Denies dyspnea on exertion, asthma, COPD or orthopnea. GI: Denies GERD, history of GI bleed or melena, PUD, hepatitis or cirrhosis. : Denies dysuria, hematuria. denies BPH symptoms. HEMATOLOGIC: Denies anemia or blood disorders. ENDOCRINE: Denies thyroid disease. MUSCULOSKELETAL: See HPI. NEUROLOGIC: Denies seizure, peripheral neuropathy or memory loss. PSYCH: Denies depression, anxiety or insomnia. SKIN: Denies rash or open sores. Objective:    PHYSICAL EXAM  GENERAL:   Patient Vitals for the past 8 hrs:   Height Weight   12/16/19 0613 6' (1.829 m) 111.4 kg (245 lb 8 oz)    EYES: PERRL. EOM intact. MOUTH:Teeth and Gums normal. NECK: Full ROM. Trachea midline. No thyromegaly or JVD. CARDIOVASCULAR: Regular rate and rhythm. No murmur or gallops. No carotid bruits.  Peripheral pulses: radial 2 +, PT 2+, DP 2+ bilaterally. LUNGS: CTA bilaterally. No wheezes, rhonchi or rales. GI: positive BS. Abdomen nontender. NEUROLOGIC: Alert and oriented x 3. Bilateral equal strong had grasp and bilateral equal strong plantar flexion and dorsiflexion. GAIT: abnormal MUSCULOSKELETAL: ROM: full with pain. Tenderness: over the medial and lateral joint lines. Crepitus: present. SKIN: No rash, bruising, swelling, redness or warmth. Labs:  No results found for this or any previous visit (from the past 24 hour(s)). Xray Left Knee: stable left TKA    Assessment:  Continued pain with left TKA. Total Left Knee Arthroplasty revision Indicated. Patient Active Problem List   Diagnosis Code    CAD (coronary artery disease) I25.10    Dyslipidemia E78.5    Hypertension I10    Unspecified sleep apnea G47.30    COPD (chronic obstructive pulmonary disease) ([facility] [address] J44.9    Arthritis M19.90    Diabetes ([facility] [address] E11.9    Osteoarthritis M19.90    S/P total knee arthroplasty Z96.659    N&V (nausea and vomiting) R11.2    Epigastric abdominal pain R10.13       Plan:  I have advised the patient of the risks and consequences, including possible complications of performing total joint replacement, as well as not doing this operation. The patient had the opportunity to ask questions and have them answered to their satisfaction.      Signed:  BALDEMAR Bosch 12/16/2019

## 2019-12-17 ENCOUNTER — APPOINTMENT (OUTPATIENT)
Dept: GENERAL RADIOLOGY | Age: 69
DRG: 467 | End: 2019-12-17
Attending: ORTHOPAEDIC SURGERY
Payer: MEDICARE

## 2019-12-17 ENCOUNTER — HOSPITAL ENCOUNTER (INPATIENT)
Age: 69
LOS: 1 days | Discharge: HOME HEALTH | DRG: 467 | End: 2019-12-18
Attending: ORTHOPAEDIC SURGERY | Admitting: ORTHOPAEDIC SURGERY
Payer: MEDICARE

## 2019-12-17 ENCOUNTER — ANESTHESIA (OUTPATIENT)
Dept: SURGERY | Age: 69
DRG: 467 | End: 2019-12-17
Payer: MEDICARE

## 2019-12-17 DIAGNOSIS — Z96.652 S/P REVISION OF TOTAL KNEE, LEFT: Primary | ICD-10-CM

## 2019-12-17 PROBLEM — T84.033A MECHANICAL LOOSENING OF INTERNAL LEFT KNEE PROSTHETIC JOINT (HCC): Status: ACTIVE | Noted: 2019-12-17

## 2019-12-17 LAB
ABO + RH BLD: NORMAL
BLOOD GROUP ANTIBODIES SERPL: NORMAL
GLUCOSE BLD STRIP.AUTO-MCNC: 111 MG/DL (ref 65–100)
HGB BLD-MCNC: 11.3 G/DL (ref 13.6–17.2)
SPECIMEN EXP DATE BLD: NORMAL

## 2019-12-17 PROCEDURE — 77030012935 HC DRSG AQUACEL BMS -B: Performed by: ORTHOPAEDIC SURGERY

## 2019-12-17 PROCEDURE — C1713 ANCHOR/SCREW BN/BN,TIS/BN: HCPCS | Performed by: ORTHOPAEDIC SURGERY

## 2019-12-17 PROCEDURE — 82962 GLUCOSE BLOOD TEST: CPT

## 2019-12-17 PROCEDURE — 74011250636 HC RX REV CODE- 250/636: Performed by: NURSE ANESTHETIST, CERTIFIED REGISTERED

## 2019-12-17 PROCEDURE — 77030040830 HC CATH URETH FOL MDII -A: Performed by: ORTHOPAEDIC SURGERY

## 2019-12-17 PROCEDURE — 97110 THERAPEUTIC EXERCISES: CPT

## 2019-12-17 PROCEDURE — 73560 X-RAY EXAM OF KNEE 1 OR 2: CPT

## 2019-12-17 PROCEDURE — 77030007880 HC KT SPN EPDRL BBMI -B: Performed by: ANESTHESIOLOGY

## 2019-12-17 PROCEDURE — 97165 OT EVAL LOW COMPLEX 30 MIN: CPT

## 2019-12-17 PROCEDURE — 0SPW0JZ REMOVAL OF SYNTHETIC SUBSTITUTE FROM LEFT KNEE JOINT, TIBIAL SURFACE, OPEN APPROACH: ICD-10-PCS | Performed by: ORTHOPAEDIC SURGERY

## 2019-12-17 PROCEDURE — 77030013708 HC HNDPC SUC IRR PULS STRY –B: Performed by: ORTHOPAEDIC SURGERY

## 2019-12-17 PROCEDURE — 77030013140 HC MSK NEB VYRM -A

## 2019-12-17 PROCEDURE — 85018 HEMOGLOBIN: CPT

## 2019-12-17 PROCEDURE — 77010033678 HC OXYGEN DAILY

## 2019-12-17 PROCEDURE — 97535 SELF CARE MNGMENT TRAINING: CPT

## 2019-12-17 PROCEDURE — 77030013819 HC MX SYS CEM ZIMM -B: Performed by: ORTHOPAEDIC SURGERY

## 2019-12-17 PROCEDURE — C1776 JOINT DEVICE (IMPLANTABLE): HCPCS | Performed by: ORTHOPAEDIC SURGERY

## 2019-12-17 PROCEDURE — 77030000032 HC CUF TRNQT ZIMM -B: Performed by: ORTHOPAEDIC SURGERY

## 2019-12-17 PROCEDURE — 76010000172 HC OR TIME 2.5 TO 3 HR INTENSV-TIER 1: Performed by: ORTHOPAEDIC SURGERY

## 2019-12-17 PROCEDURE — 77030006804 HC BLD SAW RECIP CNMD -B: Performed by: ORTHOPAEDIC SURGERY

## 2019-12-17 PROCEDURE — 36415 COLL VENOUS BLD VENIPUNCTURE: CPT

## 2019-12-17 PROCEDURE — 74011250636 HC RX REV CODE- 250/636: Performed by: ANESTHESIOLOGY

## 2019-12-17 PROCEDURE — 74011000250 HC RX REV CODE- 250: Performed by: ORTHOPAEDIC SURGERY

## 2019-12-17 PROCEDURE — 94760 N-INVAS EAR/PLS OXIMETRY 1: CPT

## 2019-12-17 PROCEDURE — 77030033067 HC SUT PDO STRATFX SPIR J&J -B: Performed by: ORTHOPAEDIC SURGERY

## 2019-12-17 PROCEDURE — 94640 AIRWAY INHALATION TREATMENT: CPT

## 2019-12-17 PROCEDURE — 87075 CULTR BACTERIA EXCEPT BLOOD: CPT

## 2019-12-17 PROCEDURE — 77030020256 HC SOL INJ NACL 0.9%  500ML: Performed by: ORTHOPAEDIC SURGERY

## 2019-12-17 PROCEDURE — 76060000036 HC ANESTHESIA 2.5 TO 3 HR: Performed by: ORTHOPAEDIC SURGERY

## 2019-12-17 PROCEDURE — 74011250637 HC RX REV CODE- 250/637: Performed by: ANESTHESIOLOGY

## 2019-12-17 PROCEDURE — 74011000250 HC RX REV CODE- 250: Performed by: NURSE ANESTHETIST, CERTIFIED REGISTERED

## 2019-12-17 PROCEDURE — 77030003665 HC NDL SPN BBMI -A: Performed by: ANESTHESIOLOGY

## 2019-12-17 PROCEDURE — 77030003602 HC NDL NRV BLK BBMI -B: Performed by: ANESTHESIOLOGY

## 2019-12-17 PROCEDURE — 86900 BLOOD TYPING SEROLOGIC ABO: CPT

## 2019-12-17 PROCEDURE — 77030019557 HC ELECTRD VES SEAL MEDT -F: Performed by: ORTHOPAEDIC SURGERY

## 2019-12-17 PROCEDURE — 74011000250 HC RX REV CODE- 250: Performed by: ANESTHESIOLOGY

## 2019-12-17 PROCEDURE — 76010010054 HC POST OP PAIN BLOCK: Performed by: ORTHOPAEDIC SURGERY

## 2019-12-17 PROCEDURE — 77030018836 HC SOL IRR NACL ICUM -A: Performed by: ORTHOPAEDIC SURGERY

## 2019-12-17 PROCEDURE — 77030008462 HC STPLR SKN PROX J&J -A: Performed by: ORTHOPAEDIC SURGERY

## 2019-12-17 PROCEDURE — 77030006777 HC BLD SAW OSC CNMD -B: Performed by: ORTHOPAEDIC SURGERY

## 2019-12-17 PROCEDURE — 74011000258 HC RX REV CODE- 258: Performed by: ORTHOPAEDIC SURGERY

## 2019-12-17 PROCEDURE — 87205 SMEAR GRAM STAIN: CPT

## 2019-12-17 PROCEDURE — 77030031139 HC SUT VCRL2 J&J -A: Performed by: ORTHOPAEDIC SURGERY

## 2019-12-17 PROCEDURE — 0SPD09Z REMOVAL OF LINER FROM LEFT KNEE JOINT, OPEN APPROACH: ICD-10-PCS | Performed by: ORTHOPAEDIC SURGERY

## 2019-12-17 PROCEDURE — 65270000029 HC RM PRIVATE

## 2019-12-17 PROCEDURE — 76210000006 HC OR PH I REC 0.5 TO 1 HR: Performed by: ORTHOPAEDIC SURGERY

## 2019-12-17 PROCEDURE — 76942 ECHO GUIDE FOR BIOPSY: CPT | Performed by: ORTHOPAEDIC SURGERY

## 2019-12-17 PROCEDURE — 74011250636 HC RX REV CODE- 250/636: Performed by: ORTHOPAEDIC SURGERY

## 2019-12-17 PROCEDURE — 0SRW0J9 REPLACEMENT OF LEFT KNEE JOINT, TIBIAL SURFACE WITH SYNTHETIC SUBSTITUTE, CEMENTED, OPEN APPROACH: ICD-10-PCS | Performed by: ORTHOPAEDIC SURGERY

## 2019-12-17 PROCEDURE — 74011250637 HC RX REV CODE- 250/637: Performed by: ORTHOPAEDIC SURGERY

## 2019-12-17 PROCEDURE — 97161 PT EVAL LOW COMPLEX 20 MIN: CPT

## 2019-12-17 PROCEDURE — 77030040922 HC BLNKT HYPOTHRM STRY -A: Performed by: ANESTHESIOLOGY

## 2019-12-17 DEVICE — BASEPLATE TIB SZ 7 ROT PLATFRM CO CHROM MOLYBDENUM TI ALLY: Type: IMPLANTABLE DEVICE | Site: KNEE | Status: FUNCTIONAL

## 2019-12-17 DEVICE — IMPLANTABLE DEVICE: Type: IMPLANTABLE DEVICE | Site: KNEE | Status: FUNCTIONAL

## 2019-12-17 DEVICE — CEMENT BNE SIMPLEX W/GENT -- 10/PK: Type: IMPLANTABLE DEVICE | Site: KNEE | Status: FUNCTIONAL

## 2019-12-17 RX ORDER — LIDOCAINE HYDROCHLORIDE 20 MG/ML
INJECTION, SOLUTION EPIDURAL; INFILTRATION; INTRACAUDAL; PERINEURAL AS NEEDED
Status: DISCONTINUED | OUTPATIENT
Start: 2019-12-17 | End: 2019-12-17 | Stop reason: HOSPADM

## 2019-12-17 RX ORDER — ATORVASTATIN CALCIUM 40 MG/1
40 TABLET, FILM COATED ORAL
Status: DISCONTINUED | OUTPATIENT
Start: 2019-12-17 | End: 2019-12-18 | Stop reason: HOSPADM

## 2019-12-17 RX ORDER — SODIUM CHLORIDE 0.9 % (FLUSH) 0.9 %
5-40 SYRINGE (ML) INJECTION EVERY 8 HOURS
Status: DISCONTINUED | OUTPATIENT
Start: 2019-12-17 | End: 2019-12-18 | Stop reason: HOSPADM

## 2019-12-17 RX ORDER — NALOXONE HYDROCHLORIDE 0.4 MG/ML
.2-.4 INJECTION, SOLUTION INTRAMUSCULAR; INTRAVENOUS; SUBCUTANEOUS
Status: DISCONTINUED | OUTPATIENT
Start: 2019-12-17 | End: 2019-12-18 | Stop reason: HOSPADM

## 2019-12-17 RX ORDER — SODIUM CHLORIDE 0.9 % (FLUSH) 0.9 %
5-40 SYRINGE (ML) INJECTION AS NEEDED
Status: DISCONTINUED | OUTPATIENT
Start: 2019-12-17 | End: 2019-12-17 | Stop reason: HOSPADM

## 2019-12-17 RX ORDER — ONDANSETRON 2 MG/ML
INJECTION INTRAMUSCULAR; INTRAVENOUS AS NEEDED
Status: DISCONTINUED | OUTPATIENT
Start: 2019-12-17 | End: 2019-12-17 | Stop reason: HOSPADM

## 2019-12-17 RX ORDER — CEFAZOLIN SODIUM/WATER 2 G/20 ML
2 SYRINGE (ML) INTRAVENOUS EVERY 8 HOURS
Status: DISCONTINUED | OUTPATIENT
Start: 2019-12-17 | End: 2019-12-18 | Stop reason: HOSPADM

## 2019-12-17 RX ORDER — DIPHENHYDRAMINE HCL 25 MG
25 CAPSULE ORAL
Status: DISCONTINUED | OUTPATIENT
Start: 2019-12-17 | End: 2019-12-18 | Stop reason: HOSPADM

## 2019-12-17 RX ORDER — PROPOFOL 10 MG/ML
INJECTION, EMULSION INTRAVENOUS
Status: DISCONTINUED | OUTPATIENT
Start: 2019-12-17 | End: 2019-12-17 | Stop reason: HOSPADM

## 2019-12-17 RX ORDER — CARVEDILOL 3.12 MG/1
3.12 TABLET ORAL EVERY 12 HOURS
Status: DISCONTINUED | OUTPATIENT
Start: 2019-12-17 | End: 2019-12-18 | Stop reason: HOSPADM

## 2019-12-17 RX ORDER — AMOXICILLIN 250 MG
2 CAPSULE ORAL DAILY
Status: DISCONTINUED | OUTPATIENT
Start: 2019-12-18 | End: 2019-12-18 | Stop reason: HOSPADM

## 2019-12-17 RX ORDER — NALOXONE HYDROCHLORIDE 0.4 MG/ML
0.2 INJECTION, SOLUTION INTRAMUSCULAR; INTRAVENOUS; SUBCUTANEOUS AS NEEDED
Status: DISCONTINUED | OUTPATIENT
Start: 2019-12-17 | End: 2019-12-17 | Stop reason: HOSPADM

## 2019-12-17 RX ORDER — LOSARTAN POTASSIUM 50 MG/1
100 TABLET ORAL DAILY
Status: DISCONTINUED | OUTPATIENT
Start: 2019-12-18 | End: 2019-12-18 | Stop reason: HOSPADM

## 2019-12-17 RX ORDER — SODIUM CHLORIDE 9 MG/ML
100 INJECTION, SOLUTION INTRAVENOUS CONTINUOUS
Status: DISCONTINUED | OUTPATIENT
Start: 2019-12-17 | End: 2019-12-18 | Stop reason: HOSPADM

## 2019-12-17 RX ORDER — HYDROMORPHONE HYDROCHLORIDE 1 MG/ML
1 INJECTION, SOLUTION INTRAMUSCULAR; INTRAVENOUS; SUBCUTANEOUS
Status: DISCONTINUED | OUTPATIENT
Start: 2019-12-17 | End: 2019-12-18 | Stop reason: HOSPADM

## 2019-12-17 RX ORDER — DEXAMETHASONE SODIUM PHOSPHATE 4 MG/ML
INJECTION, SOLUTION INTRA-ARTICULAR; INTRALESIONAL; INTRAMUSCULAR; INTRAVENOUS; SOFT TISSUE AS NEEDED
Status: DISCONTINUED | OUTPATIENT
Start: 2019-12-17 | End: 2019-12-17 | Stop reason: HOSPADM

## 2019-12-17 RX ORDER — ROPIVACAINE HYDROCHLORIDE 2 MG/ML
INJECTION, SOLUTION EPIDURAL; INFILTRATION; PERINEURAL
Status: COMPLETED | OUTPATIENT
Start: 2019-12-17 | End: 2019-12-17

## 2019-12-17 RX ORDER — SODIUM CHLORIDE 0.9 % (FLUSH) 0.9 %
5-40 SYRINGE (ML) INJECTION EVERY 8 HOURS
Status: DISCONTINUED | OUTPATIENT
Start: 2019-12-17 | End: 2019-12-17 | Stop reason: HOSPADM

## 2019-12-17 RX ORDER — BUPIVACAINE HYDROCHLORIDE 7.5 MG/ML
INJECTION, SOLUTION INTRASPINAL
Status: COMPLETED | OUTPATIENT
Start: 2019-12-17 | End: 2019-12-17

## 2019-12-17 RX ORDER — TRANEXAMIC ACID 100 MG/ML
INJECTION, SOLUTION INTRAVENOUS AS NEEDED
Status: DISCONTINUED | OUTPATIENT
Start: 2019-12-17 | End: 2019-12-17 | Stop reason: HOSPADM

## 2019-12-17 RX ORDER — BUDESONIDE 0.5 MG/2ML
500 INHALANT ORAL
Status: DISCONTINUED | OUTPATIENT
Start: 2019-12-17 | End: 2019-12-18 | Stop reason: HOSPADM

## 2019-12-17 RX ORDER — PROMETHAZINE HYDROCHLORIDE 25 MG/1
25 TABLET ORAL
Status: DISCONTINUED | OUTPATIENT
Start: 2019-12-17 | End: 2019-12-18 | Stop reason: HOSPADM

## 2019-12-17 RX ORDER — SODIUM CHLORIDE, SODIUM LACTATE, POTASSIUM CHLORIDE, CALCIUM CHLORIDE 600; 310; 30; 20 MG/100ML; MG/100ML; MG/100ML; MG/100ML
75 INJECTION, SOLUTION INTRAVENOUS CONTINUOUS
Status: DISCONTINUED | OUTPATIENT
Start: 2019-12-17 | End: 2019-12-17 | Stop reason: HOSPADM

## 2019-12-17 RX ORDER — ACETAMINOPHEN 10 MG/ML
1000 INJECTION, SOLUTION INTRAVENOUS ONCE
Status: COMPLETED | OUTPATIENT
Start: 2019-12-17 | End: 2019-12-17

## 2019-12-17 RX ORDER — ONDANSETRON 4 MG/1
8 TABLET, ORALLY DISINTEGRATING ORAL
Status: DISCONTINUED | OUTPATIENT
Start: 2019-12-17 | End: 2019-12-18 | Stop reason: HOSPADM

## 2019-12-17 RX ORDER — DEXAMETHASONE SODIUM PHOSPHATE 4 MG/ML
INJECTION, SOLUTION INTRA-ARTICULAR; INTRALESIONAL; INTRAMUSCULAR; INTRAVENOUS; SOFT TISSUE
Status: COMPLETED | OUTPATIENT
Start: 2019-12-17 | End: 2019-12-17

## 2019-12-17 RX ORDER — ASPIRIN 81 MG/1
81 TABLET ORAL EVERY 12 HOURS
Status: DISCONTINUED | OUTPATIENT
Start: 2019-12-17 | End: 2019-12-18 | Stop reason: HOSPADM

## 2019-12-17 RX ORDER — ROPIVACAINE HYDROCHLORIDE 2 MG/ML
INJECTION, SOLUTION EPIDURAL; INFILTRATION; PERINEURAL AS NEEDED
Status: DISCONTINUED | OUTPATIENT
Start: 2019-12-17 | End: 2019-12-17 | Stop reason: HOSPADM

## 2019-12-17 RX ORDER — EPHEDRINE SULFATE/0.9% NACL/PF 50 MG/5 ML
SYRINGE (ML) INTRAVENOUS AS NEEDED
Status: DISCONTINUED | OUTPATIENT
Start: 2019-12-17 | End: 2019-12-17 | Stop reason: HOSPADM

## 2019-12-17 RX ORDER — SODIUM CHLORIDE 0.9 % (FLUSH) 0.9 %
5-40 SYRINGE (ML) INJECTION AS NEEDED
Status: DISCONTINUED | OUTPATIENT
Start: 2019-12-17 | End: 2019-12-18 | Stop reason: HOSPADM

## 2019-12-17 RX ORDER — MIDAZOLAM HYDROCHLORIDE 1 MG/ML
2 INJECTION, SOLUTION INTRAMUSCULAR; INTRAVENOUS
Status: COMPLETED | OUTPATIENT
Start: 2019-12-17 | End: 2019-12-17

## 2019-12-17 RX ORDER — CEFAZOLIN SODIUM/WATER 2 G/20 ML
2 SYRINGE (ML) INTRAVENOUS ONCE
Status: COMPLETED | OUTPATIENT
Start: 2019-12-17 | End: 2019-12-17

## 2019-12-17 RX ORDER — CELECOXIB 200 MG/1
200 CAPSULE ORAL EVERY 12 HOURS
Status: DISCONTINUED | OUTPATIENT
Start: 2019-12-17 | End: 2019-12-18 | Stop reason: HOSPADM

## 2019-12-17 RX ORDER — LIDOCAINE HYDROCHLORIDE 10 MG/ML
0.1 INJECTION INFILTRATION; PERINEURAL AS NEEDED
Status: DISCONTINUED | OUTPATIENT
Start: 2019-12-17 | End: 2019-12-17 | Stop reason: HOSPADM

## 2019-12-17 RX ORDER — ACETAMINOPHEN 500 MG
1000 TABLET ORAL ONCE
Status: COMPLETED | OUTPATIENT
Start: 2019-12-17 | End: 2019-12-17

## 2019-12-17 RX ORDER — MIDAZOLAM HYDROCHLORIDE 1 MG/ML
INJECTION, SOLUTION INTRAMUSCULAR; INTRAVENOUS AS NEEDED
Status: DISCONTINUED | OUTPATIENT
Start: 2019-12-17 | End: 2019-12-17 | Stop reason: HOSPADM

## 2019-12-17 RX ORDER — ALBUTEROL SULFATE 0.83 MG/ML
2.5 SOLUTION RESPIRATORY (INHALATION)
Status: DISCONTINUED | OUTPATIENT
Start: 2019-12-17 | End: 2019-12-18 | Stop reason: HOSPADM

## 2019-12-17 RX ORDER — DEXAMETHASONE SODIUM PHOSPHATE 100 MG/10ML
10 INJECTION INTRAMUSCULAR; INTRAVENOUS ONCE
Status: DISCONTINUED | OUTPATIENT
Start: 2019-12-18 | End: 2019-12-18 | Stop reason: HOSPADM

## 2019-12-17 RX ORDER — ACETAMINOPHEN 500 MG
1000 TABLET ORAL EVERY 6 HOURS
Status: DISCONTINUED | OUTPATIENT
Start: 2019-12-18 | End: 2019-12-18 | Stop reason: HOSPADM

## 2019-12-17 RX ORDER — KETOROLAC TROMETHAMINE 30 MG/ML
INJECTION, SOLUTION INTRAMUSCULAR; INTRAVENOUS AS NEEDED
Status: DISCONTINUED | OUTPATIENT
Start: 2019-12-17 | End: 2019-12-17 | Stop reason: HOSPADM

## 2019-12-17 RX ORDER — CELECOXIB 200 MG/1
200 CAPSULE ORAL ONCE
Status: COMPLETED | OUTPATIENT
Start: 2019-12-17 | End: 2019-12-17

## 2019-12-17 RX ADMIN — Medication 2 G: at 19:44

## 2019-12-17 RX ADMIN — PROPOFOL 100 MCG/KG/MIN: 10 INJECTION, EMULSION INTRAVENOUS at 12:07

## 2019-12-17 RX ADMIN — MIDAZOLAM 1 MG: 1 INJECTION INTRAMUSCULAR; INTRAVENOUS at 11:58

## 2019-12-17 RX ADMIN — ACETAMINOPHEN 1000 MG: 10 INJECTION, SOLUTION INTRAVENOUS at 18:00

## 2019-12-17 RX ADMIN — SODIUM CHLORIDE, SODIUM LACTATE, POTASSIUM CHLORIDE, AND CALCIUM CHLORIDE 75 ML/HR: 600; 310; 30; 20 INJECTION, SOLUTION INTRAVENOUS at 10:54

## 2019-12-17 RX ADMIN — ATORVASTATIN CALCIUM 40 MG: 40 TABLET, FILM COATED ORAL at 21:02

## 2019-12-17 RX ADMIN — CELECOXIB 200 MG: 200 CAPSULE ORAL at 21:02

## 2019-12-17 RX ADMIN — Medication 3 AMPULE: at 10:56

## 2019-12-17 RX ADMIN — ACETAMINOPHEN 1000 MG: 500 TABLET, FILM COATED ORAL at 23:59

## 2019-12-17 RX ADMIN — Medication 5 ML: at 19:45

## 2019-12-17 RX ADMIN — ASPIRIN 81 MG: 81 TABLET ORAL at 21:02

## 2019-12-17 RX ADMIN — ROPIVACAINE HYDROCHLORIDE 40 MG: 2 INJECTION, SOLUTION EPIDURAL; INFILTRATION at 11:39

## 2019-12-17 RX ADMIN — BUPIVACAINE HYDROCHLORIDE IN DEXTROSE 15 MG: 7.5 INJECTION, SOLUTION SUBARACHNOID at 12:06

## 2019-12-17 RX ADMIN — ACETAMINOPHEN 1000 MG: 500 TABLET, FILM COATED ORAL at 10:52

## 2019-12-17 RX ADMIN — CELECOXIB 200 MG: 200 CAPSULE ORAL at 10:52

## 2019-12-17 RX ADMIN — CARVEDILOL 3.12 MG: 3.12 TABLET, FILM COATED ORAL at 21:02

## 2019-12-17 RX ADMIN — TAPENTADOL HYDROCHLORIDE 100 MG: 50 TABLET, FILM COATED ORAL at 23:59

## 2019-12-17 RX ADMIN — MIDAZOLAM 1 MG: 1 INJECTION INTRAMUSCULAR; INTRAVENOUS at 11:54

## 2019-12-17 RX ADMIN — Medication 2 G: at 11:50

## 2019-12-17 RX ADMIN — DEXAMETHASONE SODIUM PHOSPHATE 10 MG: 4 INJECTION, SOLUTION INTRAMUSCULAR; INTRAVENOUS at 12:17

## 2019-12-17 RX ADMIN — SODIUM CHLORIDE, SODIUM LACTATE, POTASSIUM CHLORIDE, AND CALCIUM CHLORIDE: 600; 310; 30; 20 INJECTION, SOLUTION INTRAVENOUS at 11:50

## 2019-12-17 RX ADMIN — BUDESONIDE 500 MCG: 0.5 INHALANT RESPIRATORY (INHALATION) at 21:16

## 2019-12-17 RX ADMIN — LIDOCAINE HYDROCHLORIDE 40 MG: 20 INJECTION, SOLUTION EPIDURAL; INFILTRATION; INTRACAUDAL; PERINEURAL at 12:13

## 2019-12-17 RX ADMIN — Medication 15 MG: at 12:38

## 2019-12-17 RX ADMIN — Medication 1 AMPULE: at 21:02

## 2019-12-17 RX ADMIN — TRANEXAMIC ACID 1000 MG: 100 INJECTION, SOLUTION INTRAVENOUS at 12:07

## 2019-12-17 RX ADMIN — TAPENTADOL HYDROCHLORIDE 100 MG: 50 TABLET, FILM COATED ORAL at 17:45

## 2019-12-17 RX ADMIN — MIDAZOLAM 2 MG: 1 INJECTION INTRAMUSCULAR; INTRAVENOUS at 11:34

## 2019-12-17 RX ADMIN — ONDANSETRON 4 MG: 2 INJECTION INTRAMUSCULAR; INTRAVENOUS at 12:17

## 2019-12-17 RX ADMIN — DEXAMETHASONE SODIUM PHOSPHATE 4 MG: 4 INJECTION, SOLUTION INTRAMUSCULAR; INTRAVENOUS at 11:39

## 2019-12-17 NOTE — ANESTHESIA PREPROCEDURE EVALUATION
Anesthetic History   No history of anesthetic complications  PONV          Review of Systems / Medical History  Patient summary reviewed, nursing notes reviewed and pertinent labs reviewed    Pulmonary    COPD: severe    Sleep apnea: CPAP  Smoker      Comments: Lung cancer, s/p radiation   Neuro/Psych   Within defined limits           Cardiovascular    Hypertension: well controlled              Exercise tolerance: >4 METS  Comments: Nonischemic cardiomyopathy, last echo showed EF >50%, mild aortic valve calcification, mild TR   GI/Hepatic/Renal  Within defined limits              Endo/Other    Diabetes: well controlled, type 2    Obesity and arthritis     Other Findings              Physical Exam    Airway  Mallampati: II  TM Distance: 4 - 6 cm  Neck ROM: normal range of motion   Mouth opening: Normal     Cardiovascular    Rhythm: regular  Rate: normal         Dental  No notable dental hx       Pulmonary  Breath sounds clear to auscultation      :left    Prolonged expiration     Abdominal  GI exam deferred       Other Findings            Anesthetic Plan    ASA: 3  Anesthesia type: spinal      Post-op pain plan if not by surgeon: peripheral nerve block single      Anesthetic plan and risks discussed with: Patient

## 2019-12-17 NOTE — ANESTHESIA POSTPROCEDURE EVALUATION
Procedure(s):  LEFT TOTAL KNEE ARTHROPLASTY REVISION DEPUY.     spinal, regional    Anesthesia Post Evaluation      Multimodal analgesia: multimodal analgesia used between 6 hours prior to anesthesia start to PACU discharge  Patient location during evaluation: PACU  Patient participation: complete - patient participated  Level of consciousness: awake and alert  Pain management: adequate  Airway patency: patent  Anesthetic complications: no  Cardiovascular status: acceptable  Respiratory status: acceptable  Hydration status: acceptable  Post anesthesia nausea and vomiting:  none      Vitals Value Taken Time   /68 12/17/2019  2:59 PM   Temp 34.4 °C (94 °F) 12/17/2019  2:33 PM   Pulse 47 12/17/2019  2:59 PM   Resp 16 12/17/2019  2:59 PM   SpO2 96 % 12/17/2019  2:59 PM

## 2019-12-17 NOTE — ANESTHESIA PROCEDURE NOTES
Left Adductor Canal Block    Start time: 12/17/2019 11:34 AM  End time: 12/17/2019 11:39 AM  Performed by: Reg Oro MD  Authorized by: Reg Oro MD       Pre-procedure: Indications: at surgeon's request and post-op pain management    Preanesthetic Checklist: patient identified, risks and benefits discussed, site marked, timeout performed, anesthesia consent given and patient being monitored    Timeout Time: 11:34  Preanesthetic Checklist comment:  Time out at    Block Type:   Block Type: Adductor canal  Laterality:  Left  Monitoring:  Responsive to questions, standard ASA monitoring, continuous pulse ox, oxygen, frequent vital sign checks and heart rate  Injection Technique:  Single shot  Prep: chlorhexidine    Location:  Mid thigh  Needle Type:  Stimuplex  Needle Gauge:  21 G  Needle Localization:  Anatomical landmarks and ultrasound guidance    Assessment:  Number of attempts:  1  Injection Assessment:  Incremental injection every 5 mL, no paresthesia, ultrasound image on chart, local visualized surrounding nerve on ultrasound, negative aspiration for blood and no intravascular symptoms  Patient tolerance:  Patient tolerated the procedure well with no immediate complications  The relevant block area was scanned before, during, and after the local anesthetic injection and no gross abnormalities were observed.

## 2019-12-17 NOTE — PROGRESS NOTES
12/17/19 1719   Oxygen Therapy   O2 Sat (%) 97 %   Pulse via Oximetry 64 beats per minute   O2 Device Room air   Incentive Spirometry Treatment   Actual Volume (ml) 2500 ml   Number of Attempts 1   Joint Camp Notes Reviewed. Pt working on IS. Pt encouraged to do 10 breaths per hour while awake on IS. Good NPC. No respiratory distress noted at this time. No complications noted at this time.  Pt has home CPAP

## 2019-12-17 NOTE — INTERVAL H&P NOTE
H&P Update: 
Rafael Daly was seen and examined. History and physical has been reviewed. The patient has been examined.  There have been no significant clinical changes since the completion of the originally dated History and Physical.

## 2019-12-17 NOTE — ANESTHESIA PROCEDURE NOTES
Spinal Block    Start time: 12/17/2019 11:59 AM  End time: 12/17/2019 12:06 PM  Performed by: Wanda Johns MD  Authorized by: Wanda Johns MD     Pre-procedure:   Indications: primary anesthetic  Preanesthetic Checklist: patient identified, risks and benefits discussed, anesthesia consent, site marked, patient being monitored and timeout performed    Timeout Time: 11:59          Spinal Block:   Patient Position:  Seated  Prep Region:  Lumbar  Prep: chlorhexidine      Location:  L3-4  Technique:  Single shot    Local Dose (mL):  1    Needle:   Needle Type:  Pencan  Needle Gauge:  25 G  Attempts:  1      Events: CSF confirmed, no blood with aspiration and no paresthesia        Assessment:  Insertion:  Uncomplicated  Patient tolerance:  Patient tolerated the procedure well with no immediate complications

## 2019-12-17 NOTE — BRIEF OP NOTE
BRIEF OPERATIVE NOTE    Date of Procedure: 12/17/2019   Preoperative Diagnosis: Unspecified complication of internal orthopedic prosthetic device, implant and graft, initial encounter ([facility] [J99. 9XXA]  Postoperative Diagnosis: Unspecified complication of internal orthopedic prosthetic device, implant and graft, initial encounter ([facility] [B28. 9XXA]    Procedure(s):  LEFT TOTAL KNEE ARTHROPLASTY REVISION DEPUY  Surgeon(s) and Role:     * Neida Peterson MD - Primary         Surgical Assistant: Kristyn Levy PA-C    Surgical Staff:  Circ-1: Jeremy Madrigal RN  Circ-Relief: Raoul Barthel, RN  Scrub Tech-1: Leatha Gray  Scrub Tech-2: Yazan Patel  No case tracking events are documented in the log. Anesthesia: Spinal   Estimated Blood Loss: 300cc  TT 62 minutes  Specimens:   ID Type Source Tests Collected by Time Destination   1 : left knee - opening    Neida Peterson MD 12/17/2019 1043 Microbiology   2 : left knee - deep    Neida Peterson MD 12/17/2019 1043 Microbiology   3 : left knee - closing    Neida Peterson MD 12/17/2019 1044 Microbiology      Findings: Well fixed and positioned Femoral and patellar components/ Loose tibia at the implant cement interface without subsidence or loss of tibial position. Complications: none  Implants:   Implant Name Type Inv.  Item Serial No.  Lot No. LRB No. Used Action   CEMENT BNE SIMPLEX W/GENT -- 10/PK - [id number]  CEMENT BNE SIMPLEX W/GENT -- 10/PK [id number] [employer] HOW [id number] Left 2 Implanted   REV RPTIB BASE SZ 7 JOSE -- ATTUNE - [id number]  REV RPTIB BASE SZ 7 JOSE -- ATTUNE [id number] [facility] [employer] [id number] Left 1 Implanted   INSERT TIB PS RP SZ 8 12MM -- ATTUNE - [id number]  INSERT TIB PS RP SZ 8 12MM -- ATTUNE [id number] [facility] [employer] [id number] Left 1 Implanted

## 2019-12-17 NOTE — PROGRESS NOTES
Problem: Mobility Impaired (Adult and Pediatric)  Goal: *Acute Goals and Plan of Care (Insert Text)  Description  GOALS (1-4 days):  (1.)Mr. Mars Milton will move from supine to sit and sit to supine  in bed with SUPERVISION. (2.)Mr. Mars Milton will transfer from bed to chair and chair to bed with SUPERVISION using the least restrictive device. (3.)Mr. Mars Milton will ambulate with SUPERVISION for 200 feet with the least restrictive device. (4.)Mr. Mars Milton will ambulate up/down 4 steps with bilateral  railing with STAND BY ASSIST with no device. (5.)Mr. Mars Milton will increase left knee ROM to 5°-80°.  ________________________________________________________________________________________________     Outcome: Progressing Towards Goal     PHYSICAL THERAPY JOINT CAMP TKA: Initial Assessment and PM 12/17/2019  INPATIENT: Hospital Day: 1  [place] / [place] / Product Type: Medicare /      NAME/AGE/GENDER: Gibson Mayorga is a 77 y.o. male   PRIMARY DIAGNOSIS:  Unspecified complication of internal orthopedic prosthetic device, implant and graft, initial encounter ([facility] Goyo.Braulio. 9XXA]   Procedure(s) and Anesthesia Type:     * LEFT TOTAL KNEE ARTHROPLASTY REVISION DEPUY - Spinal (Left)  ICD-10: Treatment Diagnosis:    Pain in Left Knee (M25.562)  Stiffness of Left Knee, Not elsewhere classified (E37.595)  Difficulty in walking, Not elsewhere classified (R26.2)      ASSESSMENT:     Mr. Mars Milton presents s/p L TKA revision. Patient demonstrates decreased L LE strength and ROM and decreased independence with mobility. Patient had his original TKA in 2015. He reports he has had pain/difficulty since ~2017 after a slip and almost fall. Patient would benefit from therapy to facilitate a return to independence and improve quality of life. He will return home at d/c with assist from his spouse.     This section established at most recent assessment   PROBLEM LIST (Impairments causing functional limitations):  Decreased Strength  Decreased ADL/Functional Activities  Decreased Transfer Abilities  Decreased Ambulation Ability/Technique  Decreased Balance  Increased Pain  Decreased Flexibility/Joint Mobility  Edema/Girth  Decreased [place] with Home Exercise Program   INTERVENTIONS PLANNED: (Benefits and precautions of physical therapy have been discussed with the patient.)  bed mobility  gait training  home exercise program (HEP)  Range of Motion: active/assisted/passive  Therapeutic Activities  therapeutic exercise/strengthening  transfer training  Group Therapy     TREATMENT PLAN: Frequency/Duration: Follow patient BID for duration of hospital stay to address above goals. Rehabilitation Potential For Stated Goals: Good     RECOMMENDED REHABILITATION/EQUIPMENT: (at time of discharge pending progress): Continue Skilled Therapy. HISTORY:   History of Present Injury/Illness (Reason for Referral):  L TKA revision  Past Medical History/Comorbidities:   Mr. Heidi De Souza  has a past medical history of Arthritis, BMI 33.0-33.9,adult, CAD (coronary artery disease), Chronic pain, COPD, Diabetes ([address] Dyslipidemia, Former smoker, Heart failure ([address] History of echocardiogram (12/14/2018), Hypertension, Lung cancer ([address] N&V (nausea and vomiting) (04/23/2015), and Unspecified sleep apnea.  He also has no past medical history of Adverse effect of anesthesia, Aneurysm ([address] Arrhythmia, Asthma, Autoimmune disease ([address] Cancer ([address] Chronic kidney disease, Coagulation defects, Coagulation disorder ([address] Difficult intubation, Endocarditis, GERD (gastroesophageal reflux disease), Ill-defined condition, Liver disease, Malignant hyperthermia due to anesthesia, Morbid obesity ([address] Nicotine vapor product user, Non-nicotine vapor product user, Pseudocholinesterase deficiency, Psychiatric disorder, PUD (peptic ulcer disease), Rheumatic fever, Seizures ([address] Stroke ([address] Thromboembolus ([address] Thyroid disease, or Unspecified adverse effect of anesthesia. Mr. Nora Christensen  has a past surgical history that includes hx orthopaedic (Left, 1975); hx shoulder arthroscopy (Left, 2004); hx shoulder arthroscopy (Right, 2010); hx knee arthroscopy (Left, 2014); pr cardiac surg procedure unlist (2006); hx cataract removal (Left, 2015); hx knee replacement (Left, 04/21/2015); hx colonoscopy (2017); and hx retinal detachment repair (Left). Social History/Living Environment:   Home Environment: Private residence  # Steps to Enter: 4  Hand Rails : Bilateral  One/Two Story Residence: One story  Living Alone: No  Support Systems: Spouse/Significant Other/Partner  Patient Expects to be Discharged to[de-identified] Private residence  Current DME Used/Available at Home: Thurlow Slates, straight, Commode, bedside, Crutches, Shower chair, Walker, rolling  Tub or Shower Type: Shower  Prior Level of Function/Work/Activity:  Independent    Number of Personal Factors/Comorbidities that affect the Plan of Care: 0: LOW COMPLEXITY   EXAMINATION:   Most Recent Physical Functioning:      Gross Assessment  AROM: Within functional limits(R LE)  Strength:  Within functional limits(R LE)  Coordination: Within functional limits                LLE Strength  L Hip Flexion: 2+  L Knee Flexion: 2+  L Knee Extension: 2+    Bed Mobility  Supine to Sit: Contact guard assistance  Scooting: Contact guard assistance    Transfers  Sit to Stand: Contact guard assistance  Stand to Sit: Contact guard assistance  Bed to Chair: Contact guard assistance    Balance  Sitting: Intact  Standing: With support              Weight Bearing Status  Left Side Weight Bearing: As tolerated  Distance (ft): 60 Feet (ft)  Ambulation - Level of Assistance: Contact guard assistance  Assistive Device: Walker, rolling  Base of Support: Center of gravity altered  Speed/Enriqueta: Pace decreased (<100 feet/min)  Step Length: Left shortened;Right shortened  Stance: Right decreased  Interventions: Safety awareness training;Verbal cues     Braces/Orthotics: none    Left Knee Cold  Type: Cryocuff      Body Structures Involved:  Joints  Muscles Body Functions Affected: Movement Related Activities and Participation Affected: Mobility   Number of elements that affect the Plan of Care: 4+: HIGH COMPLEXITY   CLINICAL PRESENTATION:   Presentation: Stable and uncomplicated: LOW COMPLEXITY   CLINICAL DECISION MAKIN[address] [place] [address] AM-PAC 6 Clicks   Basic Mobility Inpatient Short Form  How much difficulty does the patient currently have. .. Unable A Lot A Little None   1. Turning over in bed (including adjusting bedclothes, sheets and blankets)? [] 1   [] 2   [x] 3   [] 4   2. Sitting down on and standing up from a chair with arms ( e.g., wheelchair, bedside commode, etc.)   [] 1   [] 2   [x] 3   [] 4   3. Moving from lying on back to sitting on the side of the bed? [] 1   [] 2   [x] 3   [] 4   How much help from another person does the patient currently need. .. Total A Lot A Little None   4. Moving to and from a bed to a chair (including a wheelchair)? [] 1   [] 2   [x] 3   [] 4   5. Need to walk in hospital room? [] 1   [] 2   [x] 3   [] 4   6. Climbing 3-5 steps with a railing? [] 1   [] 2   [x] 3   [] 4   © , Trustees of [address] [place] [address], under license to [employer]. All rights reserved     Score:  Initial: 18 Most Recent: X (Date: -- )    Interpretation of Tool:  Represents activities that are increasingly more difficult (i.e. Bed mobility, Transfers, Gait). Medical Necessity:     Patient is expected to demonstrate progress in   strength, range of motion, balance, and coordination   to   increase independence with mobility and HEP. .  Reason for Services/Other Comments:  Patient continues to require skilled intervention due to   Decreased L LE strength and ROM and decreased independence with mobility s/p TKA.    .   Use of outcome tool(s) and clinical judgement create a POC that gives a: Clear prediction of patient's progress: LOW COMPLEXITY            TREATMENT:   (In addition to Assessment/Re-Assessment sessions the following treatments were rendered)     Pre-treatment Symptoms/Complaints:  Patient ready to get up. Pain Initial:   Pain Intensity 1: 2  Post Session:  2/10     Therapeutic Exercise: (10 Minutes):  Exercises per grid below to improve mobility and strength. Required minimal verbal cues to promote proper body alignment. Progressed range and repetitions as indicated. Date:  12/17/19 Date:   Date:     ACTIVITY/EXERCISE AM PM AM PM AM PM   GROUP THERAPY  []  []  []  []  []  []   Ankle Pumps  10       Quad Sets  10       Gluteal Sets  10       Hip ABd/ADduction  10       Straight Leg Raises  10       Knee Slides  10       Short Arc Quads  10       Long Arc Quads         Chair Slides                  B = bilateral; AA = active assistive; A = active; P = passive      Treatment/Session Assessment:     Response to Treatment:  Patient participated well and moving well. Hoping to d/c home tomorrow. Education:  [] Home Exercises  [x] Fall Precautions  []  [] D/C Instruction Review  [] Knee Prosthesis Review  [x] Walker Management/Safety [] Adaptive Equipment as Needed       Interdisciplinary Collaboration:   Physical Therapist  Occupational Therapist  Registered Nurse    After treatment position/precautions:   Up in chair  Bed/Chair-wheels locked  Call light within reach    Compliance with Program/Exercises: Will assess as treatment progresses. Recommendations/Intent for next treatment session:  Treatment next visit will focus on increasing Mr. Tiwaris independence with bed mobility, transfers, gait training, strength/ROM exercises, modalities for pain, and patient education.       Total Treatment Duration:  PT Patient Time In/Time Out  Time In: 1545  Time Out: [address] [place], PT

## 2019-12-18 VITALS
OXYGEN SATURATION: 96 % | BODY MASS INDEX: 33.25 KG/M2 | HEART RATE: 55 BPM | RESPIRATION RATE: 16 BRPM | TEMPERATURE: 97.7 F | SYSTOLIC BLOOD PRESSURE: 133 MMHG | WEIGHT: 245.5 LBS | HEIGHT: 72 IN | DIASTOLIC BLOOD PRESSURE: 71 MMHG

## 2019-12-18 PROBLEM — Z96.652 S/P REVISION OF TOTAL KNEE, LEFT: Status: ACTIVE | Noted: 2019-12-18

## 2019-12-18 LAB
ANION GAP SERPL CALC-SCNC: 5 MMOL/L (ref 7–16)
BUN SERPL-MCNC: 23 MG/DL (ref 8–23)
CALCIUM SERPL-MCNC: 8.3 MG/DL (ref 8.3–10.4)
CHLORIDE SERPL-SCNC: 108 MMOL/L (ref 98–107)
CO2 SERPL-SCNC: 28 MMOL/L (ref 21–32)
CREAT SERPL-MCNC: 1.05 MG/DL (ref 0.8–1.5)
GLUCOSE SERPL-MCNC: 138 MG/DL (ref 65–100)
POTASSIUM SERPL-SCNC: 4 MMOL/L (ref 3.5–5.1)
SODIUM SERPL-SCNC: 141 MMOL/L (ref 136–145)

## 2019-12-18 PROCEDURE — 94640 AIRWAY INHALATION TREATMENT: CPT

## 2019-12-18 PROCEDURE — 74011250637 HC RX REV CODE- 250/637: Performed by: ORTHOPAEDIC SURGERY

## 2019-12-18 PROCEDURE — 97116 GAIT TRAINING THERAPY: CPT

## 2019-12-18 PROCEDURE — 97150 GROUP THERAPEUTIC PROCEDURES: CPT

## 2019-12-18 PROCEDURE — 97110 THERAPEUTIC EXERCISES: CPT

## 2019-12-18 PROCEDURE — 36415 COLL VENOUS BLD VENIPUNCTURE: CPT

## 2019-12-18 PROCEDURE — 80048 BASIC METABOLIC PNL TOTAL CA: CPT

## 2019-12-18 PROCEDURE — 97535 SELF CARE MNGMENT TRAINING: CPT

## 2019-12-18 PROCEDURE — 94760 N-INVAS EAR/PLS OXIMETRY 1: CPT

## 2019-12-18 PROCEDURE — 74011000250 HC RX REV CODE- 250: Performed by: ORTHOPAEDIC SURGERY

## 2019-12-18 PROCEDURE — 74011250636 HC RX REV CODE- 250/636: Performed by: ORTHOPAEDIC SURGERY

## 2019-12-18 RX ORDER — ASPIRIN 81 MG/1
81 TABLET ORAL EVERY 12 HOURS
Qty: 60 TAB | Refills: 0 | Status: SHIPPED | OUTPATIENT
Start: 2019-12-18 | End: 2020-01-17

## 2019-12-18 RX ADMIN — SENNOSIDES AND DOCUSATE SODIUM 2 TABLET: 8.6; 5 TABLET ORAL at 09:03

## 2019-12-18 RX ADMIN — CARVEDILOL 3.12 MG: 3.12 TABLET, FILM COATED ORAL at 09:03

## 2019-12-18 RX ADMIN — BUDESONIDE 500 MCG: 0.5 INHALANT RESPIRATORY (INHALATION) at 08:40

## 2019-12-18 RX ADMIN — CELECOXIB 200 MG: 200 CAPSULE ORAL at 09:02

## 2019-12-18 RX ADMIN — Medication 2 G: at 03:33

## 2019-12-18 RX ADMIN — Medication 5 ML: at 03:33

## 2019-12-18 RX ADMIN — ACETAMINOPHEN 1000 MG: 500 TABLET, FILM COATED ORAL at 06:53

## 2019-12-18 RX ADMIN — TAPENTADOL HYDROCHLORIDE 100 MG: 50 TABLET, FILM COATED ORAL at 09:02

## 2019-12-18 RX ADMIN — TAPENTADOL HYDROCHLORIDE 100 MG: 50 TABLET, FILM COATED ORAL at 14:13

## 2019-12-18 RX ADMIN — LOSARTAN POTASSIUM 100 MG: 50 TABLET, FILM COATED ORAL at 09:03

## 2019-12-18 RX ADMIN — Medication 1 AMPULE: at 09:02

## 2019-12-18 RX ADMIN — ASPIRIN 81 MG: 81 TABLET ORAL at 09:02

## 2019-12-18 NOTE — OP NOTES
[place]  OPERATIVE REPORT    Name:  Ricardo Paul  MR#:  [id number]  :  1950  ACCOUNT #:  [de-identified]  DATE OF SERVICE:  2019    PREOPERATIVE DIAGNOSIS:  Painful left total knee arthroplasty. POSTOPERATIVE DIAGNOSIS:  Painful left total knee arthroplasty with a loose tibial component. PROCEDURE PERFORMED:  Left total knee arthroplasty revision. SURGEON:  Avelino Goss MD    ATTENDING:  Avelino Goss MD    ASSISTANT:  Ventura Junior PA-C. This first surgical assistant was surgically necessary for intraoperative retraction and positioning needs. ANESTHESIA:  Spinal with a left-sided adductor nerve block for postoperative pain relief. COMPLICATIONS:  none. SPECIMENS REMOVED:  Three, one from opening, one from deep, and one from closing. IMPLANTS:  [employer], the femoral component and patellar component were retained. These were Attune and the femoral component was a size 8 Attune PS femoral component. The tibia was revised to a size 7, rotating platform, revision tray with a size 12 mm thick posterior stabilized AltrX polyethylene PS insert to match a size 8 femur. Following the placement of the implants, the knee could be fully extended and it could be flexed to 120 degrees. It was stable through flexion arc with a well tracking patella. There was a lateral retinacular release performed to facilitate exposure. No other releases were performed. ESTIMATED BLOOD LOSS:  Less than 300 mL. DRAINS:  None. TOURNIQUET TIME:  62 minutes. OPERATIVE FINDINGS:  Prior to incision, the knee fully extended and flexed more than 120 degrees, it was stable and well aligned. There was no significant effusion. Following incision and exposure of the knee, the femoral component was in good position and solidly well-fixed. There was minimal synovitis. There was not a lot of fluid evident and it appeared infectious.   The patellar component had a scar surrounding it.  The tibial component was initially felt to be in good position. It had not subsided or changed from its initial position. However, upon removal of the plastic, it was found to be actually unstable and there was failure of fixation at the implant cement interface without any loss between the cement and the bone itself. There was no significant bone loss evident and again no loosening or damage of the cement mantle. PROCEDURE IN DETAIL:  The patient was taken to the operating room where he was placed on the operating room table in the supine position. Once an adequate level of regional anesthesia had been achieved, the left lower extremity was prepped and draped in the usual sterile fashion following application of a tourniquet. Following surgical time-out via routine measures, the limb was exsanguinated and the tourniquet was inflated. The knee was flexed up and, utilizing the patient's previous incision, a standard medial parapatellar approach of the left knee was made. To facilitate exposure, a lateral retinacular release was performed and extensive suprapatellar synovectomy was performed for exposure. The polyethylene was removed and only after this was done  was there any detectable motion in the tibial component and an osteotome underneath it revealed that the tibial component had loss of fixation at the implant cement interface without the cement mantle being damaged as described above. With this finding, a decision was made to go ahead and revise the tibia, and retain the other components. The knee was flexed up and again after exposure was obtained, the tibia was removed preserving the other components. A re-cut of the proximal tibia using intramedullary tibial cutting guide was performed taking about 2 mm off the high side of the cement. The remnant cement was removed and the knee was sized to a size 7 rotating platform tibia.   The proximal tibia was finished upon removal of remnant cement for a stemmed rotating platform tibial component and trial with range of motion and stability as described above. All trials were removed. Two batches of Simplex cement were mixed and the tibial component was cemented into position. It was then trialed with range of motion and stability as described above with a 12 mm thick posterior stabilized rotating platform implant. This true polyethylene was inserted. A pain cocktail was injected throughout the wound. The tourniquet was deflated and hemostasis was meticulously obtained. The wound was then copiously irrigated and Betadine lavage protocol was observed. The wound was closed in layers with staples for the skin. Postoperatively, he will be allowed weightbearing as tolerated. Antibacterial prophylaxis and DVT prophylaxis per joint care routine.       Orion George MD      [id number]  D:  12/17/2019 15:01  T:  12/18/2019 2:14  JOB #:  [id number]

## 2019-12-18 NOTE — PROGRESS NOTES
Problem: Mobility Impaired (Adult and Pediatric)  Goal: *Acute Goals and Plan of Care (Insert Text)  Description  GOALS (1-4 days):  (1.)Mr. Mora Rosen will move from supine to sit and sit to supine  in bed with SUPERVISION. (2.)Mr. Mora Rosen will transfer from bed to chair and chair to bed with SUPERVISION using the least restrictive device. (3.)Mr. Mora Rosen will ambulate with SUPERVISION for 200 feet with the least restrictive device. (4.)Mr. Mora Rosen will ambulate up/down 4 steps with bilateral  railing with STAND BY ASSIST with no device. (5.)Mr. Mora Rosen will increase left knee ROM to 5°-80°.  ________________________________________________________________________________________________     Outcome: Progressing Towards Goal     PHYSICAL THERAPY JOINT CAMP TKA: Daily Note and AM 12/18/2019  INPATIENT: Hospital Day: 2  [place] / Product Type: Medicare /      NAME/AGE/GENDER: John Vieira is a 77 y.o. male   PRIMARY DIAGNOSIS:  Unspecified complication of internal orthopedic prosthetic device, implant and graft, [facility] [place]l.[place]. [id number]]   Procedure(s) and Anesthesia Type:     * LEFT TOTAL KNEE ARTHROPLASTY REVISION DEPUY - Spinal (Left)  ICD-10: Treatment Diagnosis:    · Pain in Left Knee (M25.562)  · Stiffness of Left Knee, Not elsewhere classified (T42.998)  · Difficulty in walking, Not elsewhere classified (R26.2)      ASSESSMENT:     Mr. Mora Rosen presents s/p L TKA revision. Patient demonstrates decreased L LE strength and ROM and decreased independence with mobility. Patient had his original TKA in 2015. He reports he has had pain/difficulty since ~2017 after a slip and almost fall. Patient would benefit from therapy to facilitate a return to independence and improve quality of life. He will return home at d/c with assist from his spouse. 12/18/19:  Patient participated well. Has been moving/walking frequently. Adductor block likely still working. Able to perform all exercises well. Planning to d/c home later today. This section established at most recent assessment   PROBLEM LIST (Impairments causing functional limitations):  1. Decreased Strength  2. Decreased ADL/Functional Activities  3. Decreased Transfer Abilities  4. Decreased Ambulation Ability/Technique  5. Decreased Balance  6. Increased Pain  7. Decreased Flexibility/Joint Mobility  8. Edema/Girth  9. Decreased [place] with Home Exercise Program   INTERVENTIONS PLANNED: (Benefits and precautions of physical therapy have been discussed with the patient.)  1. bed mobility  2. gait training  3. home exercise program (HEP)  4. Range of Motion: active/assisted/passive  5. Therapeutic Activities  6. therapeutic exercise/strengthening  7. transfer training  8. Group Therapy     TREATMENT PLAN: Frequency/Duration: Follow patient BID for duration of hospital stay to address above goals. Rehabilitation Potential For Stated Goals: Good     RECOMMENDED REHABILITATION/EQUIPMENT: (at time of discharge pending progress): Continue Skilled Therapy. HISTORY:   History of Present Injury/Illness (Reason for Referral):  L TKA revision  Past Medical History/Comorbidities:   Mr. Aislinn Zhou  has a past medical history of Arthritis, BMI 33.0-33.9,adult, CAD (coronary artery disease), Chronic pain, COPD, Diabetes ([address] Dyslipidemia, Former smoker, Heart failure ([address] History of echocardiogram (12/14/2018), Hypertension, Lung cancer ([address] N&V (nausea and vomiting) (04/23/2015), and Unspecified sleep apnea.  He also has no past medical history of Adverse effect of anesthesia, Aneurysm ([address] Arrhythmia, Asthma, Autoimmune disease ([address] Cancer ([address] Chronic kidney disease, Coagulation defects, Coagulation disorder ([address] Difficult intubation, Endocarditis, GERD (gastroesophageal reflux disease), Ill-defined condition, Liver disease, Malignant hyperthermia due to anesthesia, Morbid obesity ([address] Nicotine vapor product user, Non-nicotine vapor product user, Pseudocholinesterase deficiency, Psychiatric disorder, PUD (peptic ulcer disease), Rheumatic fever, Seizures ([address] Stroke ([address] Thromboembolus ([address] Thyroid disease, or Unspecified adverse effect of anesthesia. Mr. Eduardo Rodriguez  has a past surgical history that includes hx orthopaedic (Left, 1975); hx shoulder arthroscopy (Left, 2004); hx shoulder arthroscopy (Right, 2010); hx knee arthroscopy (Left, 2014); pr cardiac surg procedure unlist (2006); hx cataract removal (Left, 2015); hx knee replacement (Left, 04/21/2015); hx colonoscopy (2017); and hx retinal detachment repair (Left). Social History/Living Environment:   Home Environment: Private residence  # Steps to Enter: 4  Hand Rails : Bilateral  One/Two Story Residence: One story  Living Alone: No  Support Systems: Spouse/Significant Other/Partner  Patient Expects to be Discharged to[de-identified] Private residence  Current DME Used/Available at Home: [employer], straight, Commode, bedside, Walker, rolling, Shower chair  Tub or Shower Type: Shower  Prior Level of Function/Work/Activity:  Independent    Number of Personal Factors/Comorbidities that affect the Plan of Care: 0: LOW COMPLEXITY   EXAMINATION:   Most Recent Physical Functioning:      Gross Assessment  AROM: Within functional limits(R LE)  Strength:  Within functional limits(R LE)  Coordination: Within functional limits                LLE Strength  L Hip Flexion: 2+  L Knee Flexion: 2+  L Knee Extension: 2+         Transfers  Sit to Stand: Supervision  Stand to Sit: Supervision  Bed to Chair: Supervision    Balance  Sitting: Intact  Standing: With support              Weight Bearing Status  Left Side Weight Bearing: As tolerated  Distance (ft): 225 Feet (ft)  Ambulation - Level of Assistance: Supervision  Assistive Device: Walker, rolling  Base of Support: Center of gravity altered  Speed/Enriqueta: Pace decreased (<100 feet/min)  Step Length: Left shortened;Right shortened  Stance: Right decreased  Interventions: Safety awareness training;Verbal cues     Braces/Orthotics: none    Left Knee Cold  Type: Cryocuff      Body Structures Involved:  1. Joints  2. Muscles Body Functions Affected:  1. Movement Related Activities and Participation Affected:  1. Mobility   Number of elements that affect the Plan of Care: 4+: HIGH COMPLEXITY   CLINICAL PRESENTATION:   Presentation: Stable and uncomplicated: LOW COMPLEXITY   CLINICAL DECISION MAKIN[address] [place] [address] AM-PAC 6 Clicks   Basic Mobility Inpatient Short Form  How much difficulty does the patient currently have. .. Unable A Lot A Little None   1. Turning over in bed (including adjusting bedclothes, sheets and blankets)? [] 1   [] 2   [x] 3   [] 4   2. Sitting down on and standing up from a chair with arms ( e.g., wheelchair, bedside commode, etc.)   [] 1   [] 2   [x] 3   [] 4   3. Moving from lying on back to sitting on the side of the bed? [] 1   [] 2   [x] 3   [] 4   How much help from another person does the patient currently need. .. Total A Lot A Little None   4. Moving to and from a bed to a chair (including a wheelchair)? [] 1   [] 2   [x] 3   [] 4   5. Need to walk in hospital room? [] 1   [] 2   [x] 3   [] 4   6. Climbing 3-5 steps with a railing? [] 1   [] 2   [x] 3   [] 4   © , Trustees of [address] [place] [address], under license to [employer]. All rights reserved     Score:  Initial: 18 Most Recent: X (Date: -- )    Interpretation of Tool:  Represents activities that are increasingly more difficult (i.e. Bed mobility, Transfers, Gait). Medical Necessity:     · Patient is expected to demonstrate progress in   · strength, range of motion, balance, and coordination  ·  to   · increase independence with mobility and HEP.    · .  Reason for Services/Other Comments:  · Patient continues to require skilled intervention due to   · Decreased L LE strength and ROM and decreased independence with mobility s/p TKA. · .   Use of outcome tool(s) and clinical judgement create a POC that gives a: Clear prediction of patient's progress: LOW COMPLEXITY            TREATMENT:   (In addition to Assessment/Re-Assessment sessions the following treatments were rendered)     Pre-treatment Symptoms/Complaints:  Patient has been up frequently. Pain Initial:   Pain Intensity 1: 3  Post Session:  3/10     Therapeutic Exercise: (15 Minutes):  Exercises per grid below to improve mobility and strength. Required minimal verbal cues to promote proper body alignment. Progressed range and repetitions as indicated. Gait Training (10 Minutes):  Gait training to improve and/or restore physical functioning as related to mobility, balance and coordination. Ambulated 225 Feet (ft) with Supervision using a Walker, rolling and minimal Safety awareness training;Verbal cues related to their stance phase and stride length to promote proper body alignment and promote proper body posture. Date:  12/17/19 Date:  12/18/19 Date:     ACTIVITY/EXERCISE AM PM AM PM AM PM   GROUP THERAPY  []  []  []  []  []  []   Ankle Pumps  10 15      Quad Sets  10 15      Gluteal Sets  10 15      Hip ABd/ADduction  10 15      Straight Leg Raises  10 15      Knee Slides  10 15      Short Arc Quads  10 15      Long Arc Quads         Chair Slides                  B = bilateral; AA = active assistive; A = active; P = passive      Treatment/Session Assessment:     Response to Treatment:  Patient participated well. Moving frequently on his own. Education:  [] Home Exercises  [x] Fall Precautions  []  [] D/C Instruction Review  [] Knee Prosthesis Review  [x] Walker Management/Safety [] Adaptive Equipment as Needed       Interdisciplinary Collaboration:   o Physical Therapist  o Registered Nurse    After treatment position/precautions:   o Up in chair  o Bed/Chair-wheels locked  o Call light within reach    Compliance with Program/Exercises:  Will assess as treatment progresses. Recommendations/Intent for next treatment session:  Treatment next visit will focus on increasing Mr. Chavez's independence with bed mobility, transfers, gait training, strength/ROM exercises, modalities for pain, and patient education.       Total Treatment Duration:  PT Patient Time In/Time Out  Time In: 0800  Time Out: 0825    Adam Ochoa PT

## 2019-12-18 NOTE — PROGRESS NOTES
Shift assessment complete. Pt resting in recliner. IV, dressing clean, dry and intact. Able to dorsi/plantar flex. Iceman on L knee. Call light within reach. IS at bedside. Pt reported low pain level of about 2/10 at present. No needs/concerns voiced at this time. Will continue to monitor.

## 2019-12-18 NOTE — PROGRESS NOTES
Problem: Mobility Impaired (Adult and Pediatric)  Goal: *Acute Goals and Plan of Care (Insert Text)  Description  GOALS (1-4 days):  (1.)Mr. Susan Hernández will move from supine to sit and sit to supine  in bed with SUPERVISION. (2.)Mr. Susan Hernández will transfer from bed to chair and chair to bed with SUPERVISION using the least restrictive device. (3.)Mr. Susan Hernández will ambulate with SUPERVISION for 200 feet with the least restrictive device. (4.)Mr. Susan Hernández will ambulate up/down 4 steps with bilateral  railing with STAND BY ASSIST with no device. (5.)Mr. Susan Hernández will increase left knee ROM to 5°-80°.  ________________________________________________________________________________________________     Outcome: Progressing Towards Goal     PHYSICAL THERAPY JOINT CAMP TKA: Daily Note and PM 12/18/2019  INPATIENT: Hospital Day: 2  [place] / Product Type: Medicare /      NAME/AGE/GENDER: Yoav Garcia is a 77 y.o. male   PRIMARY DIAGNOSIS:  Unspecified complication of internal orthopedic prosthetic device, implant and graft, [facility] 9XXA]   Procedure(s) and Anesthesia Type:     * LEFT TOTAL KNEE ARTHROPLASTY REVISION DEPUY - Spinal (Left)  ICD-10: Treatment Diagnosis:    · Pain in Left Knee (M25.562)  · Stiffness of Left Knee, Not elsewhere classified (M76.269)  · Difficulty in walking, Not elsewhere classified (R26.2)      ASSESSMENT:     Mr. Susan Hernández presents s/p L TKA revision. Patient demonstrates decreased L LE strength and ROM and decreased independence with mobility. Patient had his original TKA in 2015. He reports he has had pain/difficulty since ~2017 after a slip and almost fall. Patient would benefit from therapy to facilitate a return to independence and improve quality of life. He will return home at d/c with assist from his spouse. 12/18/19:  Patient participated well. Has been moving/walking frequently. Adductor block likely still working. Able to perform all exercises well. Planning to d/c home later today. PM:  Patient continuing to move well with minimal gait impairments at this time. Great knee ROM. Negotiating steps reciprocally. Discussed block likely wearing off later today with increased pain. This section established at most recent assessment   PROBLEM LIST (Impairments causing functional limitations):  1. Decreased Strength  2. Decreased ADL/Functional Activities  3. Decreased Transfer Abilities  4. Decreased Ambulation Ability/Technique  5. Decreased Balance  6. Increased Pain  7. Decreased Flexibility/Joint Mobility  8. Edema/Girth  9. Decreased [place] with Home Exercise Program   INTERVENTIONS PLANNED: (Benefits and precautions of physical therapy have been discussed with the patient.)  1. bed mobility  2. gait training  3. home exercise program (HEP)  4. Range of Motion: active/assisted/passive  5. Therapeutic Activities  6. therapeutic exercise/strengthening  7. transfer training  8. Group Therapy     TREATMENT PLAN: Frequency/Duration: Follow patient BID for duration of hospital stay to address above goals. Rehabilitation Potential For Stated Goals: Good     RECOMMENDED REHABILITATION/EQUIPMENT: (at time of discharge pending progress): Continue Skilled Therapy. HISTORY:   History of Present Injury/Illness (Reason for Referral):  L TKA revision  Past Medical History/Comorbidities:   Mr. Ade Raines  has a past medical history of Arthritis, BMI 33.0-33.9,adult, CAD (coronary artery disease), Chronic pain, COPD, Diabetes ([address] Dyslipidemia, Former smoker, Heart failure ([address] History of echocardiogram (12/14/2018), Hypertension, Lung cancer ([address] N&V (nausea and vomiting) (04/23/2015), and Unspecified sleep apnea.  He also has no past medical history of Adverse effect of anesthesia, Aneurysm ([address] Arrhythmia, Asthma, Autoimmune disease ([address] Cancer ([address] Chronic kidney disease, Coagulation defects, Coagulation disorder ([address] Difficult intubation, Endocarditis, GERD (gastroesophageal reflux disease), Ill-defined condition, Liver disease, Malignant hyperthermia due to anesthesia, Morbid obesity ([address] Nicotine vapor product user, Non-nicotine vapor product user, Pseudocholinesterase deficiency, Psychiatric disorder, PUD (peptic ulcer disease), Rheumatic fever, Seizures ([address] Stroke ([address] Thromboembolus ([address] Thyroid disease, or Unspecified adverse effect of anesthesia. Mr. Ger Artis  has a past surgical history that includes hx orthopaedic (Left, 1975); hx shoulder arthroscopy (Left, 2004); hx shoulder arthroscopy (Right, 2010); hx knee arthroscopy (Left, 2014); pr cardiac surg procedure unlist (2006); hx cataract removal (Left, 2015); hx knee replacement (Left, 04/21/2015); hx colonoscopy (2017); and hx retinal detachment repair (Left). Social History/Living Environment:   Home Environment: Private residence  # Steps to Enter: 4  Hand Rails : Bilateral  One/Two Story Residence: One story  Living Alone: No  Support Systems: Spouse/Significant Other/Partner  Patient Expects to be Discharged to[de-identified] Private residence  Current DME Used/Available at Home: Kali Clark, marcel, Commode, bedside, Walker, rolling, Shower chair  Tub or Shower Type: Shower  Prior Level of Function/Work/Activity:  Independent    Number of Personal Factors/Comorbidities that affect the Plan of Care: 0: LOW COMPLEXITY   EXAMINATION:   Most Recent Physical Functioning:      Gross Assessment  AROM: Within functional limits(R LE)  Strength:  Within functional limits(R LE)  Coordination: Within functional limits          LLE AROM  L Knee Flexion: 110  L Knee Extension: 0     LLE Strength  L Hip Flexion: 2+  L Knee Flexion: 2+  L Knee Extension: 2+    Bed Mobility  Supine to Sit: Supervision  Scooting: Supervision    Transfers  Sit to Stand: Supervision  Stand to Sit: Supervision  Bed to Chair: Supervision    Balance  Sitting: Intact  Standing: With support              Weight Bearing Status  Left Side Weight Bearing: As tolerated  Distance (ft): 200 Feet (ft)(x 1; 100' x 1)  Ambulation - Level of Assistance: Supervision  Assistive Device: Walker, rolling  Base of Support: Center of gravity altered  Speed/Enriqueta: Pace decreased (<100 feet/min)  Step Length: Left shortened;Right shortened  Stance: Right decreased  Number of Stairs Trained: 3(reciprocally)  Stairs - Level of Assistance: Supervision  Rail Use: Both  Interventions: Safety awareness training;Verbal cues     Braces/Orthotics: none    Left Knee Cold  Type: Cryocuff      Body Structures Involved:  1. Joints  2. Muscles Body Functions Affected:  1. Movement Related Activities and Participation Affected:  1. Mobility   Number of elements that affect the Plan of Care: 4+: HIGH COMPLEXITY   CLINICAL PRESENTATION:   Presentation: Stable and uncomplicated: LOW COMPLEXITY   CLINICAL DECISION MAKIN[address] [place] [address] AM-PAC 6 Clicks   Basic Mobility Inpatient Short Form  How much difficulty does the patient currently have. .. Unable A Lot A Little None   1. Turning over in bed (including adjusting bedclothes, sheets and blankets)? [] 1   [] 2   [x] 3   [] 4   2. Sitting down on and standing up from a chair with arms ( e.g., wheelchair, bedside commode, etc.)   [] 1   [] 2   [x] 3   [] 4   3. Moving from lying on back to sitting on the side of the bed? [] 1   [] 2   [x] 3   [] 4   How much help from another person does the patient currently need. .. Total A Lot A Little None   4. Moving to and from a bed to a chair (including a wheelchair)? [] 1   [] 2   [x] 3   [] 4   5. Need to walk in hospital room? [] 1   [] 2   [x] 3   [] 4   6. Climbing 3-5 steps with a railing? [] 1   [] 2   [x] 3   [] 4   © , Trustees of [address] [place] [address], under license to [employer].  All rights reserved     Score:  Initial: 18 Most Recent: X (Date: -- )    Interpretation of Tool:  Represents activities that are increasingly more difficult (i.e. Bed mobility, Transfers, Gait). Medical Necessity:     · Patient is expected to demonstrate progress in   · strength, range of motion, balance, and coordination  ·  to   · increase independence with mobility and HEP. · .  Reason for Services/Other Comments:  · Patient continues to require skilled intervention due to   · Decreased L LE strength and ROM and decreased independence with mobility s/p TKA. · .   Use of outcome tool(s) and clinical judgement create a POC that gives a: Clear prediction of patient's progress: LOW COMPLEXITY            TREATMENT:   (In addition to Assessment/Re-Assessment sessions the following treatments were rendered)     Pre-treatment Symptoms/Complaints:  Patient ready for group session. Pain Initial:   Pain Intensity 1: 4  Post Session:  4/10     Therapeutic Exercise: (45 Minutes(group)):  Exercises per grid below to improve mobility and strength. Required minimal verbal cues to promote proper body alignment. Progressed range and repetitions as indicated. Gait Training (15 Minutes):  Gait training to improve and/or restore physical functioning as related to mobility, balance and coordination. Ambulated 200 Feet (ft)(x 1; 100' x 1) with Supervision using a Walker, rolling and minimal Safety awareness training;Verbal cues related to their stance phase and stride length to promote proper body alignment and promote proper body posture.          Date:  12/17/19 Date:  12/18/19 Date:     ACTIVITY/EXERCISE AM PM AM PM AM PM   GROUP THERAPY  []  []  []  [x]  []  []   Ankle Pumps  10 15 15     Quad Sets  10 15 15     Gluteal Sets  10 15 15     Hip ABd/ADduction  10 15 15     Straight Leg Raises  10 15 15     Knee Slides  10 15 15     Short Arc Quads  10 15 15     Long Arc Quads         Chair Slides    15              B = bilateral; AA = active assistive; A = active; P = passive      Treatment/Session Assessment:     Response to Treatment:  Patient participated well. Moving very well and minimal impairments. Great knee ROM. Education:  [x] Home Exercises  [x] Fall Precautions  []  [x] D/C Instruction Review  [x] Knee Prosthesis Review  [x] Walker Management/Safety [] Adaptive Equipment as Needed       Interdisciplinary Collaboration:   o Physical Therapist  o Physical Therapy Assistant  o Registered Nurse  o Rehabilitation Attendant    After treatment position/precautions:   o Up in chair  o Bed/Chair-wheels locked  o Caregiver at bedside  o Call light within reach  o RN notified    Compliance with Program/Exercises: Will assess as treatment progresses. Recommendations/Intent for next treatment session:  Treatment next visit will focus on increasing Mr. Tiwaris independence with bed mobility, transfers, gait training, strength/ROM exercises, modalities for pain, and patient education.       Total Treatment Duration:  PT Patient Time In/Time Out  Time In: 1300  [facility]

## 2019-12-18 NOTE — PROGRESS NOTES
IV removed with catheter tip intact due to infiltration. IV site was covered with gauze and tape. Iceman refilled with ice and placed on LLE.

## 2019-12-18 NOTE — CONSULTS
Hospitalist Note     Admit Date:  2019  8:55 AM   Name:  Celso Nyhan   Age:  77 y.o.  :  1950   MRN:  [id number]   PCP:  Zak Rinaldi MD  Treatment Team: Attending Provider: Arline Lopez MD; Consulting Provider: Rochelle Worley MD; Occupational Therapist: William Herrera OT; Utilization Review: Mekhi Mccormack RN; Physical Therapist: Alina Celaya PT      Subjective:   Hospitalists consulted for assistance with management. Chart reviewed. Chronic issues stable. No acute IM issues.       Objective:     Patient Vitals for the past 24 hrs:   Temp Pulse Resp BP SpO2   19 0330 98.4 °F (36.9 °C) (!) 51 16 130/58    19 2328 98.5 °F (36.9 °C) (!) 58 16 99/69 95 %   19 2120     100 %   19 1932 97.5 °F (36.4 °C) 62 16 147/85 94 %   19 1719     97 %   19 1538 96.2 °F (35.7 °C) (!) 52 16 139/84 94 %   19 1511  (!) 46 16 134/61 96 %   19 1459  (!) 47 16 127/68 96 %   19 1448  (!) 51 16 120/68 93 %   19 1443  (!) 56 16 108/53 95 %   19 1438  65 16 107/55 95 %   19 1433 (!) 94 °F (34.4 °C) (!) 56 16 128/66 95 %   19 1144  (!) 51 16 133/73 100 %   19 1139  (!) 54 16 138/80 98 %   19 1134  (!) 51 18 170/87 99 %   19 1111  (!) 47 20 161/77 99 %   19 1014 97.6 °F (36.4 °C) (!) 47 18 139/78 97 %     Oxygen Therapy  O2 Sat (%): 95 % (198)  Pulse via Oximetry: 65 beats per minute (19)  O2 Device: Room air (19)  O2 Flow Rate (L/min): 2 l/min (19 1511)    Intake/Output Summary (Last 24 hours) at 2019 0832  Last data filed at 2019 1932  Gross per 24 hour   Intake 2000 ml   Output 250 ml   Net 1750 ml         Current Meds:  Current Facility-Administered Medications   Medication Dose Route Frequency    albuterol (PROVENTIL VENTOLIN) nebulizer solution 2.5 mg  2.5 mg Nebulization Q6H PRN    atorvastatin (LIPITOR) tablet 40 mg  40 mg Oral QHS    budesonide (PULMICORT) 500 mcg/2 ml nebulizer suspension  500 mcg Nebulization BID RT    carvedilol (COREG) tablet 3.125 mg  3.125 mg Oral Q12H    albuterol (PROVENTIL VENTOLIN) nebulizer solution 2.5 mg  2.5 mg Nebulization BID RT    losartan (COZAAR) tablet 100 mg  100 mg Oral DAILY    revefenacin (YUPELRI) nebulizer solution 175 mcg (Patient Supplied)  3 mL Nebulization Q24H    alcohol 62% (NOZIN) nasal  1 Ampule  1 Ampule Topical Q12H    0.9% sodium chloride infusion  100 mL/hr IntraVENous CONTINUOUS    sodium chloride (NS) flush 5-40 mL  5-40 mL IntraVENous Q8H    sodium chloride (NS) flush 5-40 mL  5-40 mL IntraVENous PRN    ceFAZolin (ANCEF) 2 g/20 mL in sterile water IV syringe  2 g IntraVENous Q8H    acetaminophen (TYLENOL) tablet 1,000 mg  1,000 mg Oral Q6H    celecoxib (CELEBREX) capsule 200 mg  200 mg Oral Q12H    HYDROmorphone (PF) (DILAUDID) injection 1 mg  1 mg IntraVENous Q3H PRN    naloxone (NARCAN) injection 0.2-0.4 mg  0.2-0.4 mg IntraVENous Q10MIN PRN    dexamethasone (DECADRON) injection 10 mg  10 mg IntraVENous ONCE    promethazine (PHENERGAN) tablet 25 mg  25 mg Oral Q6H PRN    diphenhydrAMINE (BENADRYL) capsule 25 mg  25 mg Oral Q4H PRN    senna-docusate (PERICOLACE) 8.6-50 mg per tablet 2 Tab  2 Tab Oral DAILY    aspirin delayed-release tablet 81 mg  81 mg Oral Q12H    ondansetron (ZOFRAN ODT) tablet 8 mg  8 mg Oral Q8H PRN    tapentadol (NUCYNTA) tablet 50 mg  50 mg Oral Q4H PRN    tapentadol (NUCYNTA) tablet 100 mg  100 mg Oral Q4H PRN       Data Review:   Recent Results (from the past 24 hour(s))   TYPE & SCREEN    Collection Time: 12/17/19 10:48 AM   Result Value Ref Range    Crossmatch Expiration 12/20/2019     ABO/Rh(D) A POSITIVE     Antibody screen NEG    GLUCOSE, POC    Collection Time: 12/17/19 11:04 AM   Result Value Ref Range    Glucose (POC) 111 (H) 65 - 100 mg/dL   CULTURE, ANAEROBIC    Collection Time: 12/17/19 12:44 PM   Result Value Ref Range    Special Requests: LEFT  OPENING 1 SWAB        Culture result: NO ANAEROBIC GROWTH IN 1 DAY     CULTURE, ANAEROBIC    Collection Time: 12/17/19 12:44 PM   Result Value Ref Range    Special Requests: LEFT  DEEP 2 SWAB        Culture result:        NO GROWTH AFTER SHORT PERIOD OF INCUBATION. FURTHER RESULTS TO FOLLOW AFTER OVERNIGHT INCUBATION. CULTURE, ANAEROBIC    Collection Time: 12/17/19 12:44 PM   Result Value Ref Range    Special Requests: LEFT  CLOSING 3 SWAB        Culture result:        NO GROWTH AFTER SHORT PERIOD OF INCUBATION. FURTHER RESULTS TO FOLLOW AFTER OVERNIGHT INCUBATION. HEMOGLOBIN    Collection Time: 12/17/19  8:00 PM   Result Value Ref Range    HGB 11.3 (L) 13.6 - 82.9 g/dL   METABOLIC PANEL, BASIC    Collection Time: 12/18/19  3:50 AM   Result Value Ref Range    Sodium 141 136 - 145 mmol/L    Potassium 4.0 3.5 - 5.1 mmol/L    Chloride 108 (H) 98 - 107 mmol/L    CO2 28 21 - 32 mmol/L    Anion gap 5 (L) 7 - 16 mmol/L    Glucose 138 (H) 65 - 100 mg/dL    BUN 23 8 - 23 MG/DL    Creatinine 1.05 0.8 - 1.5 MG/DL    GFR est AA >60 >60 ml/min/1.73m2    GFR est non-AA >60 >60 ml/min/1.73m2    Calcium 8.3 8.3 - 10.4 MG/DL       All Micro Results     Procedure Component Value Units Date/Time    CULTURE, ANAEROBIC [[id number]] Collected:  12/17/19 1244    Order Status:  Completed Specimen:  Knee  Updated:  12/18/19 0807     Special Requests: --        LEFT  OPENING 1 SWAB       Culture result:       NO ANAEROBIC GROWTH IN 1 DAY          CULTURE, ANAEROBIC [[id number]] Collected:  12/17/19 1244    Order Status:  Completed Specimen:  Knee  Updated:  12/18/19 0807     Special Requests: --        LEFT  DEEP 2 SWAB       Culture result:       NO GROWTH AFTER SHORT PERIOD OF INCUBATION. FURTHER RESULTS TO FOLLOW AFTER OVERNIGHT INCUBATION.           Stefan Soto [[id number]] Collected:  12/17/19 1244    Order Status:  Completed Specimen:  Knee  Updated:  12/18/19 7266     Special Requests: --        LEFT  CLOSING 3 SWAB       Culture result:       NO GROWTH AFTER SHORT PERIOD OF INCUBATION. FURTHER RESULTS TO FOLLOW AFTER OVERNIGHT INCUBATION. CULTURE, Dipesh Acevedos STAIN [[id number]] Collected:  12/17/19 1244    Order Status:  Completed Specimen:  Wound Drainage Updated:  12/17/19 1742    CULTURE, Dipesh tAkins STAIN [[id number]] Collected:  12/17/19 1244    Order Status:  Completed Specimen:  Wound Drainage Updated:  12/17/19 1742    CULTURE, Dipesh Acevedos STAIN [[id number]] Collected:  12/17/19 1244    Order Status:  Completed Specimen:  Wound Drainage Updated:  12/17/19 1741          Other Studies:  Xr Knee Lt Max 2 Vws    Result Date: 12/17/2019  TWO-VIEW PORTABLE LEFT KNEE, December 17, 2019 at 1436 hours: CLINICAL HISTORY:  Postoperative evaluation for revision of left knee arthroplasty. COMPARISON: May 3, 2017 from [place]. FINDINGS: AP and crosstable lateral images demonstrates interval replacement of the tibial and patellar components of the tricompartmental prosthesis. The new tibial component has a longer stem. No definite acute fracture or malalignment is evident. IMPRESSION: REVISED TIBIAL AND PATELLAR COMPONENTS OF A TRICOMPARTMENTAL PROSTHESIS WITH NO ADVERSE RADIOGRAPHIC FEATURES. Assessment and Plan:     Hospital Problems as of 12/18/2019 Date Reviewed: 4/16/2015          Codes Class Noted - Resolved POA    * (Principal) S/P revision of total knee, left ICD-10-CM: K71.938  ICD-9-CM: V43.65  12/18/2019 - Present Unknown        Mechanical loosening of internal left knee prosthetic joint ([facility] [address] ICD-10-CM: I47.605Z  ICD-9-CM: 996.41, V43.65  12/17/2019 - Present Unknown              PLAN:  · Continue current treatment plan as per primary team.  · Nothing acute going on from our standpoint; will be available by phone as needed. · No charge billed to the patient for this. Thank you.     Signed:  Josiane Nichole MD

## 2019-12-18 NOTE — DISCHARGE INSTRUCTIONS
[facility]   Patient Discharge Instructions    [facility] / [id number] : 1950    Admitted 2019 Discharged: 2019     IF YOU HAVE ANY PROBLEMS ONCE YOU ARE AT HOME CALL THE FOLLOWING NUMBERS:   Main office number: [phone]    Take Home Medications     · It is important that you take the medication exactly as they are prescribed. · Keep your medication in the bottles provided by the pharmacist and keep a list of the medication names, dosages, and times to be taken in your wallet. · Do not take other medications without consulting your doctor. What to do at [address] your prehospital diet. If you have excessive nausea or vomitting call your doctor's office     Home Physical Therapy is arranged. Use rolling walker when walking. Patients who have had a joint replacement should not drive until you are seen for your follow up appointment by Dr. Herlinda Farmer. When to Call    - Call if you have a temperature greater then 101  - Unable to keep food down  - Loose control of your bladder or bowel function  - Are unable to bear any weight   - Need a pain medication refill       DISCHARGE SUMMARY from Nurse    The following personal items collected during your admission are returned to you:   Dental Appliance: Dental Appliances: None  Vision: Visual Aid: None  Hearing Aid:   na  Jewelry: Jewelry: None  Clothing: Clothing: Other (comment)  Other Valuables: Other Valuables: Cell Phone, Wallet(PT'S WIFE HAS PHONE AND WALLET)  Valuables sent to safe:   na    PATIENT INSTRUCTIONS:    After general anesthesia or intravenous sedation, for 24 hours or while taking prescription Narcotics:  · Limit your activities  · Do not drive and operate hazardous machinery  · Do not make important personal or business decisions  · Do  not drink alcoholic beverages  · If you have not urinated within 8 hours after discharge, please contact your surgeon on call.     Report the following to your surgeon:  · Excessive pain, swelling, redness or odor of or around the surgical area  · Temperature over 101  · Nausea and vomiting lasting longer than 4 hours or if unable to take medications  · Any signs of decreased circulation or nerve impairment to extremity: change in color, persistent  numbness, tingling, coldness or increase pain  · Any questions, call office @ [phone]      Keep scheduled follow up appointment. If need to change, call office @ [phone]. *  Please give a list of your current medications to your Primary Care Provider. *  Please update this list whenever your medications are discontinued, doses are      changed, or new medications (including over-the-counter products) are added. *  Please carry medication information at all times in case of emergency situations. Patient Education        Total Knee Replacement: What to Expect at Home  Your Recovery    When you leave the hospital, you should be able to move around with a walker or crutches. But you will need someone to help you at home for the next few weeks or until you have more energy and can move around better. If you need more extensive rehab, you may go to a specialized rehab center for more treatment. You will go home with a bandage and stitches, staples, tissue glue, or tape strips. Change the bandage as your doctor tells you to. If you have stitches or staples, your doctor will remove them 10 to 21 days after your surgery. Glue or tape strips will fall off on their own over time. You may still have some mild pain, and the area may be swollen for 3 to 6 months after surgery. Your knee will continue to improve for 6 to 12 months. You will probably use a walker for 1 to 3 weeks and then use crutches. When you are ready, you can use a cane. You will probably be able to walk on your own in 4 to 8 weeks. You will need to do months of physical rehabilitation (rehab) after a knee replacement.  Rehab will help you strengthen the muscles of the knee and help you regain movement. After you recover, your artificial knee will allow you to do normal daily activities with less pain or no pain at all. You may be able to hike, dance, ride a bike, and play golf. Talk to your doctor about whether you can do more strenuous activities. Always tell your caregivers that you have an artificial knee. How long it will take to walk on your own, return to normal activities, and go back to work depends on your health and how well your rehabilitation (rehab) program goes. The better you do with your rehab exercises, the quicker you will get your strength and movement back. This care sheet gives you a general idea about how long it will take for you to recover. But each person recovers at a different pace. Follow the steps below to get better as quickly as possible. How can you care for yourself at home? Activity    · Rest when you feel tired. You may take a nap, but do not stay in bed all day. When you sit, use a chair with arms. You can use the arms to help you stand up.     · Work with your physical therapist to find the best way to exercise. What you can do as your knee heals will depend on whether your new knee is cemented or uncemented. You may not be able to do certain things for a while if your new knee is uncemented.     · After your knee has healed enough, you can do more strenuous activities with caution. ? You can golf, but use a golf cart, and do not wear shoes with spikes. ? You can bike on a flat road or on a stationary bike. Avoid biking up hills. ? Your doctor may suggest that you stay away from activities that put stress on your knee. These include tennis or badminton, squash or racquetball, contact sports like football, jumping (such as in basketball), jogging, or running. ? Avoid activities where you might fall. These include horseback riding, skiing, and mountain biking.     · Do not sit for more than 1 hour at a time.  Get up and walk around for a while before you sit again. If you must sit for a long time, prop up your leg with a chair or footstool. This will help you avoid swelling.     · Ask your doctor when you can drive again. It may take up to 8 weeks after knee replacement surgery before it is safe for you to drive.     · When you get into a car, sit on the edge of the seat. Then pull in your legs, and turn to face the front.     · You should be able to do many everyday activities 3 to 6 weeks after your surgery. You will probably need to take 4 to 16 weeks off from work. When you can go back to work depends on the type of work you do and how you feel.     · Ask your doctor when it is okay for you to have sex.     · Do not lift anything heavier than 10 pounds and do not lift weights for 12 weeks. Diet    · By the time you leave the hospital, you should be eating your normal diet. If your stomach is upset, try bland, low-fat foods like plain rice, broiled chicken, toast, and yogurt. Your doctor may suggest that you take iron and vitamin supplements.     · Drink plenty of fluids (unless your doctor tells you not to).   · Eat healthy foods, and watch your portion sizes. Try to stay at your ideal weight. Too much weight puts more stress on your new knee.     · You may notice that your bowel movements are not regular right after your surgery. This is common. Try to avoid constipation and straining with bowel movements. You may want to take a fiber supplement every day. If you have not had a bowel movement after a couple of days, ask your doctor about taking a mild laxative. Medicines    · Your doctor will tell you if and when you can restart your medicines. He or she will also give you instructions about taking any new medicines.     · If you take blood thinners, such as warfarin (Coumadin), clopidogrel (Plavix), or aspirin, be sure to talk to your doctor. He or she will tell you if and when to start taking those medicines again. Make sure that you understand exactly what your doctor wants you to do.     · Your doctor may give you a blood-thinning medicine to prevent blood clots. If you take a blood thinner, be sure you get instructions about how to take your medicine safely. Blood thinners can cause serious bleeding problems. This medicine could be in pill form or as a shot (injection). If a shot is necessary, your doctor will tell you how to do this.     · Be safe with medicines. Take pain medicines exactly as directed. ? If the doctor gave you a prescription medicine for pain, take it as prescribed. ? If you are not taking a prescription pain medicine, ask your doctor if you can take an over-the-counter medicine. ? Plan to take your pain medicine 30 minutes before exercises. It is easier to prevent pain before it starts than to stop it once it has started.     · If you think your pain medicine is making you sick to your stomach:  ? Take your medicine after meals (unless your doctor has told you not to). ? Ask your doctor for a different pain medicine.     · If your doctor prescribed antibiotics, take them as directed. Do not stop taking them just because you feel better. You need to take the full course of antibiotics. Incision care    · If your doctor told you how to care for your cut (incision), follow your doctor's instructions. You will have a dressing over the cut. A dressing helps the incision heal and protects it. Your doctor will tell you how to take care of this.     · If you did not get instructions, follow this general advice:  ? If you have strips of tape on the cut the doctor made, leave the tape on for a week or until it falls off.  ? If you have stitches or staples, your doctor will tell you when to come back to have them removed. ? If you have skin adhesive on the cut, leave it on until it falls off. Skin adhesive is also called glue or liquid stitches. ? Change the bandage every day. ?  Wash the area daily with warm water, and pat it dry. Don't use hydrogen peroxide or alcohol. They can slow healing. ? You may cover the area with a gauze bandage if it oozes fluid or rubs against clothing. ? You may shower 24 to 48 hours after surgery. Pat the incision dry. Don't swim or take a bath for the first 2 weeks, or until your doctor tells you it is okay. Exercise    · Your rehab program will give you a number of exercises to do to help you get back your knee's range of motion and strength. Always do them as your therapist tells you. Ice and elevation    · For pain and swelling, put ice or a cold pack on the area for 10 to 20 minutes at a time. Put a thin cloth between the ice and your skin. Other instructions    · Continue to wear your support stockings as your doctor says. These help to prevent blood clots. The length of time that you will have to wear them depends on your activity level and the amount of swelling.     · You have metal pieces in your knee. These may set off some airport metal detectors. Carry a medical alert card that says you have an artificial joint, just in case. Follow-up care is a key part of your treatment and safety. Be sure to make and go to all appointments, and call your doctor if you are having problems. It's also a good idea to know your test results and keep a list of the medicines you take. When should you call for help? Call 911 anytime you think you may need emergency care. For example, call if:    · You passed out (lost consciousness).     · You have severe trouble breathing.     · You have sudden chest pain and shortness of breath, or you cough up blood.    Call your doctor now or seek immediate medical care if:    · You have signs of infection, such as:  ? Increased pain, swelling, warmth, or redness. ? Red streaks leading from the incision. ? Pus draining from the incision. ?  A fever.     · You have signs of a blood clot, such as:  ? Pain in your calf, back of the knee, thigh, or groin. ? Redness and swelling in your leg or groin.     · Your incision comes open and begins to bleed, or the bleeding increases.     · You have pain that does not get better after you take pain medicine.    Watch closely for changes in your health, and be sure to contact your doctor if:    · You do not have a bowel movement after taking a laxative. Where can you learn more? Go to http://jasen-christoph.info/. Enter Q276 in the search box to learn more about \"Total Knee Replacement: What to Expect at Home. \"  Current as of: June 26, 2019  Content Version: 12.2  © 8384-6628 [employer]. Care instructions adapted under license by [employer] (which disclaims liability or warranty for this information). If you have questions about a medical condition or this instruction, always ask your healthcare professional. [facility] any warranty or liability for your use of this information. These are general instructions for a healthy lifestyle:    No smoking/ No tobacco products/ Avoid exposure to second hand smoke    Surgeon General's Warning:  Quitting smoking now greatly reduces serious risk to your health. Obesity, smoking, and sedentary lifestyle greatly increases your risk for illness    A healthy diet, regular physical exercise & weight monitoring are important for maintaining a healthy lifestyle    You may be retaining fluid if you have a history of heart failure or if you experience any of the following symptoms:  Weight gain of 3 pounds or more overnight or 5 pounds in a week, increased swelling in our hands or feet or shortness of breath while lying flat in bed. Please call your doctor as soon as you notice any of these symptoms; do not wait until your next office visit.     Recognize signs and symptoms of STROKE:    F-face looks uneven    A-arms unable to move or move even    S-speech slurred or non-existent    T-time-call 911 as soon as signs and symptoms begin-DO NOT go       Back to bed or wait to see if you get better-TIME IS BRAIN. The discharge information has been reviewed with the patient. The patient verbalized understanding. Information obtained by :  I understand that if any problems occur once I am at home I am to contact my physician. I understand and acknowledge receipt of the instructions indicated above.                                                                                                                                            Physician's or R.N.'s Signature                                                                  Date/Time                                                                                                                                              Patient or Representative Signature                                                          Date/Time

## 2019-12-18 NOTE — PROGRESS NOTES
Problem: Self Care Deficits Care Plan (Adult)  Goal: *Acute Goals and Plan of Care (Insert Text)  Description  GOALS:   DISCHARGE GOALS (in preparation for going home/rehab):  3 days  1. Mr. Parvez Kingsley will perform one lower body dressing activity with minimal assistance required to demonstrate improved functional mobility and safety. -GOAL MET 12/18/2019   2. Mr. Parvez Kingsley will perform one lower body bathing activity with minimal assistance required to demonstrate improved functional mobility and safety. -GOAL MET 12/18/2019   3. Mr. Parvez Kingsley will perform toileting/toilet transfer with contact guard assistance to demonstrate improved functional mobility and safety. -GOAL MET 12/18/2019   4. Mr. Parvez Kingsley will perform shower transfer with contact guard assistance to demonstrate improved functional mobility and safety. -GOAL MET 12/18/2019        JOINT CAMP OCCUPATIONAL THERAPY TKA: Daily Note and Discharge 12/18/2019  INPATIENT: Hospital Day: 2  [place] / [place] / Product Type: Medicare /      NAME/AGE/GENDER: Samara Mitchell is a 77 y.o. male   PRIMARY DIAGNOSIS:  Unspecified complication of internal orthopedic prosthetic device, implant and graft, initial encounter ([facility] [address] Mathew.Guilherme. 9XXA]   Procedure(s) and Anesthesia Type:     * LEFT TOTAL KNEE ARTHROPLASTY REVISION DEPUY - Spinal (Left)  ICD-10: Treatment Diagnosis:    · Pain in Left Knee (M25.562)  · Stiffness of Left Knee, Not elsewhere classified (S65.081)      ASSESSMENT:      Mr. Parvez Kingsley is s/p Left TKA and presents with decreased weight bearing on L LE and decreased independence with functional mobility and activities of daily living. Patient completed shower and dressing as charter below in ADL grid and is ambulating with rolling walker and stand by assist.  Patient has met 4/4 goals and plans to return home with good family support. Family able to provide patient with appropriate level of assistance at this time.     Will do well at home for self cares and transfers during ADL's.  D/C OT for acute deficits. This section established at most recent assessment   PROBLEM LIST (Impairments causing functional limitations):  1. Decreased Strength  2. Decreased ADL/Functional Activities  3. Decreased Transfer Abilities  4. Increased Pain  5. Increased Fatigue  6. Decreased Flexibility/Joint Mobility  7. Decreased Knowledge of Precautions   INTERVENTIONS PLANNED: (Benefits and precautions of occupational therapy have been discussed with the patient.)  1. Activities of daily living training  2. Adaptive equipment training  3. Balance training  4. Clothing management  5. Donning&doffing training  6. Theraputic activity     TREATMENT PLAN: Frequency/Duration: Follow patient 1-2tx to address above goals. Rehabilitation Potential For Stated Goals: Excellent     RECOMMENDED REHABILITATION/EQUIPMENT: (at time of discharge pending progress): Continue Skilled Therapy. OCCUPATIONAL PROFILE AND HISTORY:   History of Present Injury/Illness (Reason for Referral): Pt presents this date s/p (Left) TKA. Past Medical History/Comorbidities:   Mr. David Mast  has a past medical history of Arthritis, BMI 33.0-33.9,adult, CAD (coronary artery disease), Chronic pain, COPD, Diabetes ([address] Dyslipidemia, Former smoker, Heart failure ([address] History of echocardiogram (12/14/2018), Hypertension, Lung cancer ([address] N&V (nausea and vomiting) (04/23/2015), and Unspecified sleep apnea.  He also has no past medical history of Adverse effect of anesthesia, Aneurysm ([address] Arrhythmia, Asthma, Autoimmune disease ([address] Cancer ([address] Chronic kidney disease, Coagulation defects, Coagulation disorder ([address] Difficult intubation, Endocarditis, GERD (gastroesophageal reflux disease), Ill-defined condition, Liver disease, Malignant hyperthermia due to anesthesia, Morbid obesity ([address] Nicotine vapor product user, Non-nicotine vapor product user, Pseudocholinesterase deficiency, Psychiatric disorder, PUD (peptic ulcer disease), Rheumatic fever, Seizures ([facility] Stroke ([facility] Thromboembolus ([facility] Thyroid disease, or Unspecified adverse effect of anesthesia. Mr. Karina Luo  has a past surgical history that includes hx orthopaedic (Left, 1975); hx shoulder arthroscopy (Left, 2004); hx shoulder arthroscopy (Right, 2010); hx knee arthroscopy (Left, 2014); pr cardiac surg procedure unlist (2006); hx cataract removal (Left, 2015); hx knee replacement (Left, 04/21/2015); hx colonoscopy (2017); and hx retinal detachment repair (Left). Social History/Living Environment:   Home Environment: Private residence  # Steps to Enter: 4  Hand Rails : Bilateral  One/Two Story Residence: One story  Living Alone: No  Support Systems: Spouse/Significant Other/Partner  Patient Expects to be Discharged to[de-identified] Private residence  Current DME Used/Available at Home: Candido Lev, straight, Commode, bedside, Walker, rolling, Shower chair  Tub or Shower Type: Shower  Prior Level of Function/Work/Activity:  Independent prior. Number of Personal Factors/Comorbidities that affect the Plan of Care: Brief history (0):  LOW COMPLEXITY   ASSESSMENT OF OCCUPATIONAL PERFORMANCE[de-identified]   Most Recent Physical Functioning:   Balance  Sitting: Intact  Standing: With support       Gross Assessment  AROM: Within functional limits(R LE)  Strength: Within functional limits(R LE)  Coordination: Within functional limits          LLE Strength  L Hip Flexion: 2+  L Knee Flexion: 2+  L Knee Extension: 2+ Coordination  Fine Motor Skills-Upper: Left Intact; Right Intact  Gross Motor Skills-Upper: Left Intact; Right Intact         Mental Status  Neurologic State: Alert  Orientation Level: Oriented X4  Cognition: Appropriate decision making  Perception: Appears intact                Basic ADLs (From Assessment) Complex ADLs (From Assessment)   Basic ADL  Feeding: Independent  Oral Facial Hygiene/Grooming: Independent  Bathing: Supervision  Upper Body Dressing: Independent  Lower Body Dressing: Supervision  Toileting: Supervision     Grooming/Bathing/Dressing Activities of Daily Living                       Functional Transfers  Bathroom Mobility: Supervision/set up  Toilet Transfer : Supervision  Shower Transfer: Supervision     Bed/Mat Mobility  Supine to Sit: Supervision  Sit to Stand: Supervision  Stand to Sit: Supervision  Bed to Chair: Supervision  Scooting: Supervision         Physical Skills Involved:  1. Range of Motion  2. Balance  3. Strength Cognitive Skills Affected (resulting in the inability to perform in a timely and safe manner):  1. [facility]  Psychosocial Skills Affected:  1. WFL    Number of elements that affect the Plan of Care: 1-3:  LOW COMPLEXITY   CLINICAL DECISION MAKING:   [facility] AM-PAC 6 Clicks   Daily Activity Inpatient Short Form  How much help from another person does the patient currently need. .. Total A Lot A Little None   1. Putting on and taking off regular lower body clothing? [] 1   [] 2   [x] 3   [] 4   2. Bathing (including washing, rinsing, drying)? [] 1   [] 2   [x] 3   [] 4   3. Toileting, which includes using toilet, bedpan or urinal?   [] 1   [] 2   [x] 3   [] 4   4. Putting on and taking off regular upper body clothing? [] 1   [] 2   [] 3   [x] 4   5. Taking care of personal grooming such as brushing teeth? [] 1   [] 2   [] 3   [x] 4   6. Eating meals? [] 1   [] 2   [] 3   [x] 4   © 2007, Trustees of [facility], under license to [employer]. All rights reserved     Score:  Initial: 18 Most Recent: 21 (Date: 12/18/2019 )    Interpretation of Tool:  Represents activities that are increasingly more difficult (i.e. Bed mobility, Transfers, Gait). Medical Necessity:     · Skilled intervention continues to be required due to new TKA. Reason for Services/Other Comments:  · Patient continues to require skilled intervention due to   · Deficits lsited abvoe   · .    Use of outcome tool(s) and clinical judgement create a POC that gives a: MODERATE COMPLEXITY            TREATMENT:   (In addition to Assessment/Re-Assessment sessions the following treatments were rendered)     Pre-treatment Symptoms/Complaints:    Pain: Initial:   Pain Intensity 1: 3  Post Session:  2     Self Care: (30): Procedure(s) (per grid) utilized to improve and/or restore self-care/home management as related to dressing and toileting. Required minimal verbal and tactile cueing to facilitate activities of daily living skills. Treatment/Session Assessment:     Response to Treatment:  Good, sitting up in recliner. Education:  [] Home Exercises  [x] Fall Precautions  [] Hip Precautions [] Going Home Video  [x] Knee/Hip Prosthesis Review  [x] Walker Management/Safety [x] Adaptive Equipment as Needed       Interdisciplinary Collaboration:   o Physical Therapist  o Occupational Therapist  o Registered Nurse    After treatment position/precautions:   o Up in chair  o Bed/Chair-wheels locked  o Caregiver at bedside  o Call light within reach  o RN notified     Compliance with Program/Exercises: Compliant all of the time, Will assess as treatment progresses. Recommendations/Intent for next treatment session: D/C OT for acute deficits.       Total Treatment Duration:  OT Patient Time In/Time Out  Time In: 0905  Time Out: 1325 [facility], OT

## 2019-12-18 NOTE — PROGRESS NOTES
Orthopedic Joint Progress Note    2019  Admit Date: 2019  Admit Diagnosis: Unspecified complication of internal orthopedic prosthetic device, implant and graft, initial encounter ([facility] [address] [address] [Q15. 9XXA]  Mechanical loosening of internal left knee [facility] [T84.033A]    1 Day Post-Op    Subjective:     Izola Dew awake and alert    Review of Systems: Pertinent items are noted in HPI. Objective:     PT/OT:     PATIENT MOBILITY    Bed Mobility  Supine to Sit: Contact guard assistance  Scooting: Contact guard assistance  Transfers  Sit to Stand: Contact guard assistance  Stand to Sit: Contact guard assistance  Bed to Chair: Contact guard assistance      Gait  Base of Support: Center of gravity altered  Speed/Enriqueta: Pace decreased (<100 feet/min)  Step Length: Left shortened, Right shortened  Stance: Right decreased  Ambulation - Level of Assistance: Contact guard assistance  Distance (ft): 60 Feet (ft)  Assistive Device: Walker, rolling  Interventions: Safety awareness training, Verbal cues   Weight Bearing Status  Left Side Weight Bearing: As tolerated        Vital Signs:    Blood pressure 130/58, pulse (!) 51, temperature 98.4 °F (36.9 °C), resp. rate 16, height 6' (1.829 m), weight 111.4 kg (245 lb 8 oz), SpO2 95 %.   Temp (24hrs), Av °F (36.1 °C), Min:94 °F (34.4 °C), Max:98.5 °F (36.9 °C)      Pain Control:   Pain Assessment  Pain Scale 1: Numeric (0 - 10)  Pain Intensity 1: 3  Pain Onset 1: 3.5 YRS  Pain Location 1: Knee  Pain Orientation 1: Left  Pain Description 1: Aching, Sore  Pain Intervention(s) 1: Medication (see MAR)    Meds:  Current Facility-Administered Medications   Medication Dose Route Frequency    albuterol (PROVENTIL VENTOLIN) nebulizer solution 2.5 mg  2.5 mg Nebulization Q6H PRN    atorvastatin (LIPITOR) tablet 40 mg  40 mg Oral QHS    budesonide (PULMICORT) 500 mcg/2 ml nebulizer suspension  500 mcg Nebulization BID RT    carvedilol (COREG) tablet 3.125 mg  3.125 mg Oral Q12H    albuterol (PROVENTIL VENTOLIN) nebulizer solution 2.5 mg  2.5 mg Nebulization BID RT    losartan (COZAAR) tablet 100 mg  100 mg Oral DAILY    revefenacin (YUPELRI) nebulizer solution 175 mcg (Patient Supplied)  3 mL Nebulization Q24H    alcohol 62% (NOZIN) nasal  1 Ampule  1 Ampule Topical Q12H    0.9% sodium chloride infusion  100 mL/hr IntraVENous CONTINUOUS    sodium chloride (NS) flush 5-40 mL  5-40 mL IntraVENous Q8H    sodium chloride (NS) flush 5-40 mL  5-40 mL IntraVENous PRN    ceFAZolin (ANCEF) 2 g/20 mL in sterile water IV syringe  2 g IntraVENous Q8H    acetaminophen (TYLENOL) tablet 1,000 mg  1,000 mg Oral Q6H    celecoxib (CELEBREX) capsule 200 mg  200 mg Oral Q12H    HYDROmorphone (PF) (DILAUDID) injection 1 mg  1 mg IntraVENous Q3H PRN    naloxone (NARCAN) injection 0.2-0.4 mg  0.2-0.4 mg IntraVENous Q10MIN PRN    dexamethasone (DECADRON) injection 10 mg  10 mg IntraVENous ONCE    promethazine (PHENERGAN) tablet 25 mg  25 mg Oral Q6H PRN    diphenhydrAMINE (BENADRYL) capsule 25 mg  25 mg Oral Q4H PRN    senna-docusate (PERICOLACE) 8.6-50 mg per tablet 2 Tab  2 Tab Oral DAILY    aspirin delayed-release tablet 81 mg  81 mg Oral Q12H    ondansetron (ZOFRAN ODT) tablet 8 mg  8 mg Oral Q8H PRN    tapentadol (NUCYNTA) tablet 50 mg  50 mg Oral Q4H PRN    tapentadol (NUCYNTA) tablet 100 mg  100 mg Oral Q4H PRN        LAB:    Lab Results   Component Value Date/Time    INR 0.9 12/02/2019 12:40 PM    INR 0.9 04/13/2015 11:10 AM     Lab Results   Component Value Date/Time    HGB 11.3 (L) 12/17/2019 08:00 PM    HGB 12.5 (L) 12/02/2019 12:40 PM    HGB 12.3 (L) 05/15/2017 09:42 AM       Wound Knee Left (Active)   Number of days: 2074       Wound Knee Left (Active)   Number of days: 2037       Wound Knee Left (Active)   Number of days: 1702       Wound Knee Left (Active)   Dressing Status Clean, dry, and intact 12/18/2019  3:33 AM   Dressing Type Aquacel 12/17/2019  7:49 PM   Number of days: 1         Physical Exam:  Calves soft/ neuro intact      Assessment:      Principal Problem:    S/P revision of total knee, left (12/18/2019)    Active Problems:    Mechanical loosening of internal left knee prosthetic joint ([address] (12/17/2019)         Plan:     Continue PT/OT/Rehab  Consult: Rehab team including PT, OT, recreational therapy, and    Home soon    Patient Expects to be Discharged to[de-identified] Private residence

## 2019-12-18 NOTE — DISCHARGE SUMMARY
[address] [place] [address]  Total Joint Discharge Summary      Patient ID:  Gibson Mayorga  [id number]  13 y.o.  1950    Admit date: 12/17/2019  Discharge date and time: 12/18/19  Admitting Physician: Kaya Campuzano MD  Surgeon: Same  Admission Diagnoses: Unspecified complication of internal orthopedic prosthetic device, implant and graft, initial encounter ([facility] [Z55. 9XXA]  Mechanical loosening [facility] internal left knee [facility] [T84.033A]  Discharge Diagnoses: Principal Problem:    S/P revision of total knee, left (12/18/2019)    Active Problems:    Mechanical loosening of internal left knee prosthetic joint [facility] (12/17/2019)                                Perioperative Antibiotics: Ancef 1 to 2 mg was given depending on patient's weight. If allergic to Ancef or due to other indications, patient was given Vancomycin. Hospital Medications given:   [unfilled]  [unfilled]  [unfilled]    Discharge Medications given:  Current Discharge Medication List      START taking these medications    Details   tapentadol (NUCYNTA) 50 mg tablet Take  mg by mouth every four (4) hours as needed for Pain (1 tablet for mild pain (scale 1-3) and 2 for moderate (scale 4-7)) for up to 7 days. Max Daily Amount: 600 mg. Qty: 60 Tab, Refills: 0    Associated Diagnoses: S/P revision of total knee, left         CONTINUE these medications which have CHANGED    Details   aspirin delayed-release 81 mg tablet Take 1 Tab by mouth every twelve (12) hours every twelve (12) hours for 30 days. Qty: 60 Tab, Refills: 0         CONTINUE these medications which have NOT CHANGED    Details   losartan (COZAAR) 100 mg tablet Take 100 mg by mouth daily. Indications: high blood pressure      atorvastatin (LIPITOR) 40 mg tablet Take 40 mg by mouth nightly. Indications: high cholesterol      revefenacin (YUPELRI) 175 mcg/3 mL nebu nebulizer solution 175 mcg by Nebulization route daily (after lunch).  Indications: a chronic lung disease where the airways become partially blocked with mucus called COPD      budesonide (PULMICORT) 0.5 mg/2 mL nbsp 500 mcg by Nebulization route every twelve (12) hours. Take / use AM day of surgery  per anesthesia protocols. Indications: worsening of debilitating chronic lung disease called COPD      formoterol (PERFOROMIST) 20 mcg/2 mL nebu neb solution 20 mcg by Nebulization route every twelve (12) hours. Take / use AM day of surgery  per anesthesia protocols. multivitamin (ONE A DAY) tablet Take 1 Tab by mouth daily. albuterol (PROVENTIL HFA, VENTOLIN HFA) 90 mcg/actuation inhaler Take 2 Puffs by inhalation every four (4) hours as needed for Wheezing or Shortness of Breath. Take if needed DOS per anesthesia protocol. Bring DOS  Indications: CHRONIC OBSTRUCTIVE PULMONARY DISEASE      carvedilol (COREG) 3.125 mg tablet Take 3.125 mg by mouth every twelve (12) hours. Take / use AM day of surgery  per anesthesia protocols. Additional DVT Prophylaxis:  LILLIE Hose,Plexi-Pulse    Postoperative transfusions:   none  Post Op complications: none    Hemoglobin at discharge:   Lab Results   Component Value Date/Time    HGB 11.3 (L) 12/17/2019 08:00 PM       Wound appears to be healing without any evidence of infection. Physical Therapy started on the day following surgery and progressed to independent ambulation with the aid of a walker. At the time of discharge, able to go up and down stairs and had understanding of precautions needed following surgery.       PT/OT:            Assistive Device: Walker (comment)                Discharged to: home    Discharge instructions:  -Rx pain medication given  - Anticoagulate with: Ecotrin 81 mg PO BID x 4 weeks  -Resume pre hospital diet             -Resume home medications per medical continuation form     -Ambulate with walker, appropriate total joint protocol  -Follow up in office as scheduled       Signed:  Starla Hicks MD  12/18/2019  7:35 AM

## 2019-12-18 NOTE — PROGRESS NOTES
Care Management Interventions  PCP Verified by CM: Yes  Mode of Transport at Discharge: Self  Transition of Care Consult (CM Consult): [address]: Yes  Physical Therapy Consult: Yes  Occupational Therapy Consult: Yes  Current Support Network: Lives with Spouse  Confirm Follow Up Transport: Family  The Plan for Transition of Care is Related to the Following Treatment Goals : return to independant care  The Patient and/or Patient Representative was Provided with a Choice of Provider and Agrees with the Discharge Plan?: Yes  Freedom of Choice List was Provided with Basic Dialogue that Supports the Patient's Individualized Plan of Care/Goals, Treatment Preferences and Shares the Quality Data Associated with the Providers?: Yes  Discharge Location  Discharge Placement: Home with home health    Note from Prehab two weeks ago. SW met with pt in Prehab to discuss Left TKA revision scheduled for 12/17/19. Pt reports he had Left TKA about 4 years ago. Pt plans to return home with HHPT. Pt resides in [facility] and is agreeable to [facility]. [facility] referral completed. Pt states he has RW, BSC cane and ice man at home. No additional needs identified at this time.    Lynne Velazquez

## 2019-12-18 NOTE — PROGRESS NOTES
[address]  Face to Face Encounter    Patients Name: Gibson Mayorga    YOB: 1950    Ordering Physician: Ciara Dos Santos    Primary Diagnosis: Unspecified complication of internal orthopedic prosthetic device, implant and graft, initial encounter ([facility] Manoj Yousif. 9XXA]  Mechanical loosening of internal left knee prosthetic joint ([facility] Dominic Escotolon  S/p revision of left TKA    Date of Face to Face:   12/17/19                                 Face to Face Encounter findings are related to primary reason for home care:   yes. 1. I certify that the patient needs intermittent care as follows: physical therapy: gait/stair training    2. I certify that this patient is homebound, that is: 1) patient requires the use of a walker device, special transportation, or assistance of another to leave the home; or 2) patient's condition makes leaving the home medically contraindicated; and 3) patient has a normal inability to leave the home and leaving the home requires considerable and taxing effort. Patient may leave the home for infrequent and short duration for medical reasons, and occasional absences for non-medical reasons. Homebound status is due to the following functional limitations: Patient's ambulation limited secondary to severe pain and requires the use of an assistive device and the assistance of a caregiver for safe completion. Patient with strength and ROM deficits limiting ambulation endurance requiring the use of an assistive device and the assistance of a caregiver. Patient deemed temporarily homebound secondary to increased risk for infection when leaving home and going out into the community. 3. I certify that this patient is under my care and that I, or a nurse practitioner or [address] [place] [address], or clinical nurse specialist, or certified nurse midwife, working with me, had a Face-to-Face Encounter that meets the physician Face-to-Face Encounter requirements.   The following are the clinical findings from the [address] [place] [address] encounter that support the need for skilled services and is a summary of the encounter: see hospital chart          EVELINA Viera  12/18/2019      THE FOLLOWING TO BE COMPLETED BY THE COMMUNITY PHYSICIAN:    I concur with the findings described above from the F2F encounter that this patient is homebound and in need of a skilled service.     Certifying Physician: _____________________________________      Printed Certifying Physician Name: _____________________________________    Date: _________________

## 2019-12-19 ENCOUNTER — HOME CARE VISIT (OUTPATIENT)
Dept: SCHEDULING | Facility: HOME HEALTH | Age: 69
End: 2019-12-19
Payer: MEDICARE

## 2019-12-19 VITALS
HEART RATE: 76 BPM | TEMPERATURE: 98.1 F | RESPIRATION RATE: 16 BRPM | SYSTOLIC BLOOD PRESSURE: 130 MMHG | DIASTOLIC BLOOD PRESSURE: 84 MMHG

## 2019-12-19 PROCEDURE — 3331090002 HH PPS REVENUE DEBIT

## 2019-12-19 PROCEDURE — 3331090001 HH PPS REVENUE CREDIT

## 2019-12-19 PROCEDURE — G0151 HHCP-SERV OF PT,EA 15 MIN: HCPCS

## 2019-12-19 PROCEDURE — 400013 HH SOC

## 2019-12-20 LAB
BACTERIA SPEC CULT: NORMAL
GRAM STN SPEC: NORMAL
SERVICE CMNT-IMP: NORMAL

## 2019-12-20 PROCEDURE — 3331090002 HH PPS REVENUE DEBIT

## 2019-12-20 PROCEDURE — 3331090001 HH PPS REVENUE CREDIT

## 2019-12-21 PROCEDURE — 3331090002 HH PPS REVENUE DEBIT

## 2019-12-21 PROCEDURE — 3331090001 HH PPS REVENUE CREDIT

## 2019-12-22 PROCEDURE — 3331090002 HH PPS REVENUE DEBIT

## 2019-12-22 PROCEDURE — 3331090001 HH PPS REVENUE CREDIT

## 2019-12-23 PROCEDURE — 3331090001 HH PPS REVENUE CREDIT

## 2019-12-23 PROCEDURE — 3331090002 HH PPS REVENUE DEBIT

## 2019-12-24 PROCEDURE — 3331090002 HH PPS REVENUE DEBIT

## 2019-12-24 PROCEDURE — 3331090001 HH PPS REVENUE CREDIT

## 2019-12-25 LAB
BACTERIA SPEC CULT: NORMAL
SERVICE CMNT-IMP: NORMAL

## 2019-12-25 PROCEDURE — 3331090002 HH PPS REVENUE DEBIT

## 2019-12-25 PROCEDURE — 3331090001 HH PPS REVENUE CREDIT

## 2019-12-26 ENCOUNTER — HOME CARE VISIT (OUTPATIENT)
Dept: SCHEDULING | Facility: HOME HEALTH | Age: 69
End: 2019-12-26
Payer: MEDICARE

## 2019-12-26 VITALS
TEMPERATURE: 97.6 F | SYSTOLIC BLOOD PRESSURE: 120 MMHG | HEART RATE: 84 BPM | RESPIRATION RATE: 18 BRPM | DIASTOLIC BLOOD PRESSURE: 76 MMHG

## 2019-12-26 PROCEDURE — G0157 HHC PT ASSISTANT EA 15: HCPCS

## 2019-12-26 PROCEDURE — 3331090002 HH PPS REVENUE DEBIT

## 2019-12-26 PROCEDURE — 3331090001 HH PPS REVENUE CREDIT

## 2019-12-27 ENCOUNTER — HOME CARE VISIT (OUTPATIENT)
Dept: SCHEDULING | Facility: HOME HEALTH | Age: 69
End: 2019-12-27
Payer: MEDICARE

## 2019-12-27 VITALS
HEART RATE: 68 BPM | SYSTOLIC BLOOD PRESSURE: 118 MMHG | RESPIRATION RATE: 18 BRPM | TEMPERATURE: 97.6 F | DIASTOLIC BLOOD PRESSURE: 76 MMHG

## 2019-12-27 PROCEDURE — 3331090002 HH PPS REVENUE DEBIT

## 2019-12-27 PROCEDURE — G0157 HHC PT ASSISTANT EA 15: HCPCS

## 2019-12-27 PROCEDURE — 3331090001 HH PPS REVENUE CREDIT

## 2019-12-28 PROCEDURE — 3331090002 HH PPS REVENUE DEBIT

## 2019-12-28 PROCEDURE — 3331090001 HH PPS REVENUE CREDIT

## 2019-12-29 PROCEDURE — 3331090002 HH PPS REVENUE DEBIT

## 2019-12-29 PROCEDURE — 3331090001 HH PPS REVENUE CREDIT

## 2019-12-30 ENCOUNTER — HOME CARE VISIT (OUTPATIENT)
Dept: SCHEDULING | Facility: HOME HEALTH | Age: 69
End: 2019-12-30
Payer: MEDICARE

## 2019-12-30 VITALS
SYSTOLIC BLOOD PRESSURE: 136 MMHG | TEMPERATURE: 98.2 F | RESPIRATION RATE: 16 BRPM | DIASTOLIC BLOOD PRESSURE: 80 MMHG | HEART RATE: 84 BPM

## 2019-12-30 PROCEDURE — G0157 HHC PT ASSISTANT EA 15: HCPCS

## 2019-12-30 PROCEDURE — 3331090002 HH PPS REVENUE DEBIT

## 2019-12-30 PROCEDURE — 3331090001 HH PPS REVENUE CREDIT

## 2019-12-31 PROCEDURE — 3331090002 HH PPS REVENUE DEBIT

## 2019-12-31 PROCEDURE — 3331090001 HH PPS REVENUE CREDIT

## 2020-01-01 PROCEDURE — 3331090002 HH PPS REVENUE DEBIT

## 2020-01-01 PROCEDURE — 3331090001 HH PPS REVENUE CREDIT

## 2020-01-02 ENCOUNTER — HOME CARE VISIT (OUTPATIENT)
Dept: SCHEDULING | Facility: HOME HEALTH | Age: 70
End: 2020-01-02
Payer: MEDICARE

## 2020-01-02 VITALS
RESPIRATION RATE: 18 BRPM | DIASTOLIC BLOOD PRESSURE: 74 MMHG | TEMPERATURE: 97.8 F | HEART RATE: 84 BPM | SYSTOLIC BLOOD PRESSURE: 120 MMHG

## 2020-01-02 PROCEDURE — A4649 SURGICAL SUPPLIES: HCPCS

## 2020-01-02 PROCEDURE — 3331090002 HH PPS REVENUE DEBIT

## 2020-01-02 PROCEDURE — 3331090001 HH PPS REVENUE CREDIT

## 2020-01-02 PROCEDURE — G0157 HHC PT ASSISTANT EA 15: HCPCS

## 2020-01-03 PROCEDURE — 3331090001 HH PPS REVENUE CREDIT

## 2020-01-03 PROCEDURE — 3331090002 HH PPS REVENUE DEBIT

## 2020-01-04 PROCEDURE — 3331090002 HH PPS REVENUE DEBIT

## 2020-01-04 PROCEDURE — 3331090001 HH PPS REVENUE CREDIT

## 2020-01-05 PROCEDURE — 3331090002 HH PPS REVENUE DEBIT

## 2020-01-05 PROCEDURE — 3331090001 HH PPS REVENUE CREDIT

## 2020-01-06 ENCOUNTER — HOME CARE VISIT (OUTPATIENT)
Dept: SCHEDULING | Facility: HOME HEALTH | Age: 70
End: 2020-01-06
Payer: MEDICARE

## 2020-01-06 VITALS
SYSTOLIC BLOOD PRESSURE: 105 MMHG | DIASTOLIC BLOOD PRESSURE: 64 MMHG | RESPIRATION RATE: 18 BRPM | TEMPERATURE: 98.2 F | HEART RATE: 80 BPM

## 2020-01-06 PROCEDURE — 3331090001 HH PPS REVENUE CREDIT

## 2020-01-06 PROCEDURE — G0157 HHC PT ASSISTANT EA 15: HCPCS

## 2020-01-06 PROCEDURE — 3331090002 HH PPS REVENUE DEBIT

## 2020-01-07 PROCEDURE — 3331090002 HH PPS REVENUE DEBIT

## 2020-01-07 PROCEDURE — 3331090001 HH PPS REVENUE CREDIT

## 2020-01-08 ENCOUNTER — HOME CARE VISIT (OUTPATIENT)
Dept: HOME HEALTH SERVICES | Facility: HOME HEALTH | Age: 70
End: 2020-01-08
Payer: MEDICARE

## 2020-01-08 ENCOUNTER — HOME CARE VISIT (OUTPATIENT)
Dept: SCHEDULING | Facility: HOME HEALTH | Age: 70
End: 2020-01-08
Payer: MEDICARE

## 2020-01-08 VITALS
SYSTOLIC BLOOD PRESSURE: 104 MMHG | HEART RATE: 84 BPM | TEMPERATURE: 98.1 F | DIASTOLIC BLOOD PRESSURE: 64 MMHG | RESPIRATION RATE: 18 BRPM

## 2020-01-08 PROCEDURE — 3331090001 HH PPS REVENUE CREDIT

## 2020-01-08 PROCEDURE — G0157 HHC PT ASSISTANT EA 15: HCPCS

## 2020-01-08 PROCEDURE — 3331090002 HH PPS REVENUE DEBIT

## 2020-01-09 PROCEDURE — 3331090002 HH PPS REVENUE DEBIT

## 2020-01-09 PROCEDURE — 3331090001 HH PPS REVENUE CREDIT

## 2020-01-10 PROCEDURE — 3331090002 HH PPS REVENUE DEBIT

## 2020-01-10 PROCEDURE — 3331090001 HH PPS REVENUE CREDIT

## 2020-01-11 PROCEDURE — 3331090002 HH PPS REVENUE DEBIT

## 2020-01-11 PROCEDURE — 3331090001 HH PPS REVENUE CREDIT

## 2020-01-12 PROCEDURE — 3331090001 HH PPS REVENUE CREDIT

## 2020-01-12 PROCEDURE — 3331090002 HH PPS REVENUE DEBIT

## 2020-01-13 ENCOUNTER — HOME CARE VISIT (OUTPATIENT)
Dept: SCHEDULING | Facility: HOME HEALTH | Age: 70
End: 2020-01-13
Payer: MEDICARE

## 2020-01-13 VITALS
DIASTOLIC BLOOD PRESSURE: 68 MMHG | TEMPERATURE: 97.7 F | HEART RATE: 84 BPM | SYSTOLIC BLOOD PRESSURE: 124 MMHG | RESPIRATION RATE: 16 BRPM

## 2020-01-13 PROCEDURE — 3331090003 HH PPS REVENUE ADJ

## 2020-01-13 PROCEDURE — G0151 HHCP-SERV OF PT,EA 15 MIN: HCPCS

## 2020-01-13 PROCEDURE — 3331090002 HH PPS REVENUE DEBIT

## 2020-01-13 PROCEDURE — 3331090001 HH PPS REVENUE CREDIT

## 2020-01-15 ENCOUNTER — HOSPITAL ENCOUNTER (OUTPATIENT)
Dept: PHYSICAL THERAPY | Age: 70
Discharge: HOME | End: 2020-01-15
Payer: MEDICARE

## 2020-01-15 PROCEDURE — 97161 PT EVAL LOW COMPLEX 20 MIN: CPT

## 2020-01-15 PROCEDURE — 97110 THERAPEUTIC EXERCISES: CPT

## 2020-01-15 NOTE — THERAPY EVALUATION
Nic oRbles : 1950 [place] Secondary: Dashawn Roy at [place] [id number] [facility], [address], [address] [facility] [phone]   [phone] OUTPATIENT PHYSICAL THERAPY:Initial Assessment 1/15/2020 ICD-10: Treatment Diagnosis:  
Pain in left knee (M25.562) Stiffness of left knee, not elsewhere classified (M25.662) Pain in right knee (M25.561) Stiffness of right knee, not elsewhere classified (M25.661) Precautions/Allergies:  
Latex; Adhesive; Lortab [hydrocodone-acetaminophen]; and Oxycontin [oxycodone] TREATMENT PLAN: 
Effective Dates: 1/15/2020 TO 2020 (90 days). Frequency/Duration: 2 times a week for 90 Day(s) MEDICAL/REFERRING DIAGNOSIS: 
knee DATE OF ONSET: Chronic REFERRING PHYSICIAN: Christen Lara MD MD Orders: Evaluate and Treat Return MD Appointment: TBD INITIAL ASSESSMENT:  Mr. Irena Araya presents with decreased mobility and pain in bilateral knees secondary to degenerative changes and recent revision of L LE TKA. After discussing with patient, he agreed he would benefit from physical therapy to improve above deficits. Please sign this plan of treatment if you concur. Thank you for the opportunity to serve this patient. PROBLEM LIST (Impacting functional limitations): 1. Decreased Strength 2. Decreased ADL/Functional Activities 3. Decreased Balance 4. Increased Pain 5. Decreased Activity Tolerance INTERVENTIONS PLANNED: (Treatment may consist of any combination of the following) 1. Balance Exercise 2. Cold 3. Electrical Stimulation 4. Heat 5. Home Exercise Program (HEP) 6. Manual Therapy 7. Neuromuscular Re-education/Strengthening 8. Range of Motion (ROM) 9. Therapeutic Activites 10. Therapeutic Exercise/Strengthening 11. Ultrasound (US)  
 
GOALS: (Goals have been discussed and agreed upon with patient.) Short-Term Functional Goals: Time Frame: 30 days 1. Patient will be independent with home exercise program without exacerbation of symptoms or cueing needed. 2. Patient will be independent with correct sleeping positions and awareness/avoidance of aggravating positions without cueing needed. Discharge Goals: Time Frame: 90 days 1. Patient will be independent with all ADLs with minimal onset of bilateral knee pain and no deficits with daily tasks. 2. Patient will report no fear avoidance with social or recreational activities due to bilateral knee pain. 3. Patient will score greater than or equal to 60/80 on Lower Extremity Functional Scale with minimal effect of bilateral knee pain on patient's ability to manage every day life activities. OUTCOME MEASURE:  
Tool Used: Lower Extremity Functional Scale (LEFS) Score:  Initial: 33/80 Most Recent: X/80 (Date: -- ) Interpretation of Score: 20 questions each scored on a 5 point scale with 0 representing \"extreme difficulty or unable to perform\" and 4 representing \"no difficulty\". The lower the score, the greater the functional disability. 80/80 represents no disability. Minimal detectable change is 9 points. MEDICAL NECESSITY:  
· Patient is expected to demonstrate progress in strength, range of motion, balance, coordination and functional technique to improve safety during daily tasks. · Patient demonstrates good rehab potential due to higher previous functional level. · Skilled intervention continues to be required due to decreased mobility. REASON FOR SERVICES/OTHER COMMENTS: 
· Patient continues to demonstrate capacity to improve overall mobility which will increase independence and increase safety. Total Duration: PT Patient Time In/Time Out Time In: 1100 Time Out: 1145 Rehabilitation Potential For Stated Goals: Good Regarding Carson Chavez's therapy, I certify that the treatment plan above will be carried out by a therapist or under their direction. Thank you for this referral, 
Anthony Colin, PT Referring Physician Signature: Liz Mcgee MD _______________________________ Date _____________ PAIN/SUBJECTIVE:  
Initial: Pain Intensity 1: 4 Pain Location 1: Back, Knee Pain Orientation 1: Lower, Right, Left Pain Intervention(s) 1: Rest, Medication (see MAR)  Post Session:  4/10 HISTORY:  
History of Injury/Illness (Reason for Referral): 
Patient reports since he was last in physical therapy several years ago, he has had lung cancer. He reports he was treated with radiation for several sessions. He reports his last PET scan was negative. He reports he went to pulmonary rehab after for several months. He reports he had returned to working out at [employer], but his knee has never felt the same after he fell not long after his initial TKA. He reports this revision found that the original lower part of the joint was loose. He reports he was in surgery for an hour and a half. He reports that he did therapy in the hospital and then did home health for several weeks. He reports that he still has some pain and discomfort in his joint and is very tender along the inside of his knee to where it feels like a nerve is on fire at times. He reports he only sleeps a few hours at a time because he can't stand for anything to touch his knee and he is normally a side-sleeper. He reports he just wants to feel better, sleep through the night, and get back to a normal routine of working out at the gym. Past Medical History/Comorbidities:  
Mr. Maria Dolores Vigil  has a past medical history of Arthritis, BMI 33.0-33.9,adult, CAD (coronary artery disease), Chronic pain, COPD, Diabetes ([address] Dyslipidemia, Former smoker, Heart failure ([facility] [address] History of echocardiogram (12/14/2018), Hypertension, Lung cancer ([facility] [address] N&V (nausea and vomiting) (04/23/2015), and Unspecified sleep apnea.  He also has no past medical history of Adverse effect of anesthesia, Aneurysm ([address] Arrhythmia, Asthma, Autoimmune disease ([address] Cancer ([address] Chronic kidney disease, Coagulation defects, Coagulation disorder ([address] Difficult intubation, Endocarditis, GERD (gastroesophageal reflux disease), Ill-defined condition, Liver disease, Malignant hyperthermia due to anesthesia, Morbid obesity ([address] Nicotine vapor product user, Non-nicotine vapor product user, Pseudocholinesterase deficiency, Psychiatric disorder, PUD (peptic ulcer disease), Rheumatic fever, Seizures ([address] Stroke ([address] Thromboembolus ([address] Thyroid disease, or Unspecified adverse effect of anesthesia. Mr. Citlali Shaffer  has a past surgical history that includes hx orthopaedic (Left, 1975); hx shoulder arthroscopy (Left, 2004); hx shoulder arthroscopy (Right, 2010); hx knee arthroscopy (Left, 2014); pr cardiac surg procedure unlist (2006); hx cataract removal (Left, 2015); hx knee replacement (Left, 04/21/2015); hx colonoscopy (2017); and hx retinal detachment repair (Left). Social History/Living Environment:  
Home Environment: Private residence Living Alone: No 
Support Systems: Friends \ neighbors, Spouse/Significant Other/Partner Prior Level of Function/Work/Activity: 
Independent Dominant Side:  
      RIGHT Personal Factors:   
      Sex:  male Age:  77 y.o. Ambulatory/Rehab Services H2 Model Falls Risk Assessment Risk Factors: 
     (1)  Gender [Male] Ability to Rise from Chair: 
     (1)  Pushes up, successful in one attempt Falls Prevention Plan: No modifications necessary Total: (5 or greater = High Risk): 2  
©2010 [facility] [address] [place] [address] Patent #[id number]. Federal Law prohibits the replication, distribution or use without written permission from [facility] [employer] Current Medications:   
  
Current Outpatient Medications:  
  gabapentin (NEURONTIN) 100 mg capsule, Take 100 mg by mouth two (2) times a day., Disp: , Rfl:  
  methocarbamol (ROBAXIN) 750 mg tablet, Take 750 mg by mouth four (4) times daily as needed (muscle spasm). , Disp: , Rfl:  
  HYDROmorphone (DILAUDID) 2 mg tablet, Take 2 mg by mouth every four (4) hours as needed for Pain., Disp: , Rfl:  
  aspirin delayed-release 81 mg tablet, Take 1 Tab by mouth every twelve (12) hours every twelve (12) hours for 30 days. , Disp: 60 Tab, Rfl: 0 
  losartan (COZAAR) 100 mg tablet, Take 100 mg by mouth daily. Indications: high blood pressure, Disp: , Rfl:  
  atorvastatin (LIPITOR) 40 mg tablet, Take 40 mg by mouth nightly. Indications: high cholesterol, Disp: , Rfl:  
  revefenacin (YUPELRI) 175 mcg/3 mL nebu nebulizer solution, 175 mcg by Nebulization route daily (after lunch). Indications: a chronic lung disease where the airways become partially blocked with mucus called COPD, Disp: , Rfl:  
  budesonide (PULMICORT) 0.5 mg/2 mL nbsp, 500 mcg by Nebulization route every twelve (12) hours. Take / use AM day of surgery  per anesthesia protocols. Indications: worsening of debilitating chronic lung disease called COPD, Disp: , Rfl:  
  formoterol (PERFOROMIST) 20 mcg/2 mL nebu neb solution, 20 mcg by Nebulization route every twelve (12) hours. Take / use AM day of surgery  per anesthesia protocols. , Disp: , Rfl:  
  multivitamin (ONE A DAY) tablet, Take 1 Tab by mouth daily. , Disp: , Rfl:  
  albuterol (PROVENTIL HFA, VENTOLIN HFA) 90 mcg/actuation inhaler, Take 2 Puffs by inhalation every four (4) hours as needed for Wheezing or Shortness of Breath. Take if needed DOS per anesthesia protocol. Bring DOS  Indications: CHRONIC OBSTRUCTIVE PULMONARY DISEASE, Disp: , Rfl:  
  carvedilol (COREG) 3.125 mg tablet, Take 3.125 mg by mouth every twelve (12) hours. Take / use AM day of surgery  per anesthesia protocols. , Disp: , Rfl:   
Date Last Reviewed:  1/15/2020 Number of Personal Factors/Comorbidities that affect the Plan of Care: 1-2: MODERATE COMPLEXITY EXAMINATION:  
Observation/Orthostatic Postural Assessment:   
      Posture Assessment: Rounded shoulders, Forward head Palpation:   
      Mild swelling noted along lateral portion of left knee; extreme tenderness to light touch along lower medial portion of left knee. ROM:  
      L LE Assessment (AROM): 
 · Hip Flexion: 80 degrees · Hip Extension: 10 degrees · Hip Abduction: 30 degrees · Hip Adduction: 0 degrees · Knee Flexion: 105 degrees · Knee Extension: 0 degrees Strength:   
     R LE Assessment (Strength): · Hip Flexion: 3+/5 with manual muscle testing · Hip Extension: 3+/5 with manual muscle testing · Hip Abduction: 3+/5 with manual muscle testing · Hip Adduction: 3+/5 with manual muscle testing · Knee Flexion: 3/5 with manual muscle testing · Knee Extension: 3/5 with manual muscle testing Special Tests:   
      Negative Neurological Screen: 
      Dermatomes: Within normal limits Reflexes:  2+ Functional Mobility:  
      Gait/Mobility:  
 Base of Support: Center of gravity altered Speed/Enriqueta: Pace decreased (<100 feet/min) Step Length: Left shortened Swing Pattern: Left asymmetrical, Right asymmetrical 
Stance: Left decreased, Right increased Gait Abnormalities: Antalgic Ambulation - Level of Assistance: Independent · Interventions: Verbal cues, Safety awareness training Transfers:   
 Sit to Stand: Modified independent Stand to Sit: Modified independent Stand Pivot Transfers: Modified independent Bed to Chair: Modified independent Lateral Transfers: Modified independent Bed Mobility:   
 Rolling: Independent Supine to Sit: Independent Sit to Supine: Independent Scooting: Independent Body Structures Involved: 1. Nerves 2. Joints 3. Muscles Body Functions Affected: 1. Neuromusculoskeletal 
2. Movement Related Activities and Participation Affected: 1. Mobility 2. Self Care Number of elements (examined above) that affect the Plan of Care: 4+: HIGH COMPLEXITY CLINICAL PRESENTATION:  
Presentation: Stable and uncomplicated: LOW COMPLEXITY CLINICAL DECISION MAKING:  
Use of outcome tool(s) and clinical judgement create a POC that gives a: Questionable prediction of patient's progress: MODERATE COMPLEXITY

## 2020-01-15 NOTE — PROGRESS NOTES
Stella Luque  : 1950  [place]  Secondary: Dashawn Roy at [place] [id number] [facility], [address] [place] [address],[address], [facility] [address]  [phone]   [phone]        OUTPATIENT PHYSICAL THERAPY: Daily Treatment Note 1/15/2020  Visit Count:  1    ICD-10: Treatment Diagnosis:   · Pain in left knee (M25.562)  · Stiffness of left knee, not elsewhere classified (M25.662)  · Pain in right knee (M25.561)  · Stiffness of right knee, not elsewhere classified (M25.661)  Precautions/Allergies:   Latex; Adhesive; Lortab [hydrocodone-acetaminophen]; and Oxycontin [oxycodone]   TREATMENT PLAN:  Effective Dates: 1/15/2020 TO 2020 (90 days). Frequency/Duration: 2 times a week for 90 Day(s)    Pre-treatment Symptoms/Complaints:  1/15/2020: Patient reports he is hurting today. Pain: Initial: Pain Intensity 1: 4  Pain Location 1: Back, Knee  Pain Orientation 1: Lower, Right, Left  Pain Intervention(s) 1: Rest, Medication (see MAR)  Post Session:  4/10   Medications Last Reviewed:  1/15/2020  Updated Objective Findings:  See evaluation note from today  TREATMENT:     THERAPEUTIC EXERCISE: (30 minutes):  Exercises per grid below to improve mobility, strength, balance and coordination. Required minimal visual, verbal and manual cues to promote proper body alignment, promote proper body posture and promote proper body mechanics. Progressed resistance, range, repetitions and complexity of movement as indicated.    Date:  1/15/2020   Activity/Exercise Parameters   Nu-step 6 minutes  Level 3   Step ups 6 inch  15 reps  L LE leading  No UE support  HEP   Step downs 4 inch  15 reps  L LE on step  UE support   Tiltboard: right/left 20 reps  No UE support   Tiltboard: forward/backward 20 reps  No UE support   Calf stretch on slant board 10 reps  3 second hold  B FRIEDA   Supine active hamstring stretch 10 ankle pumps  2 sets  B LE   Supine straight leg raise 10 reps  L LE  HEP      Treatment/Session Summary:    · Response to Treatment:  Patient tolerated assessment with minimal complaints of increased knee pain. Patient verbalized and demonstrated understanding of HEP. · Communication/Consultation:  None today  · Equipment provided today:  None today  · Recommendations/Intent for next treatment session: Next visit will focus on improving overall mobility and pain with daily tasks.     Total Treatment Billable Duration:  30 minutes + evaluation  PT Patient Time In/Time Out  Time In: 1100  Time Out: 75 Rohit Davidson PT    Future Appointments   Date Time Provider Mandi Stephanie   1/15/2020 11:00 AM Della Jacome PT [facility]

## 2020-01-17 ENCOUNTER — HOSPITAL ENCOUNTER (OUTPATIENT)
Dept: PHYSICAL THERAPY | Age: 70
Discharge: HOME | End: 2020-01-17
Payer: MEDICARE

## 2020-01-17 PROCEDURE — 97140 MANUAL THERAPY 1/> REGIONS: CPT

## 2020-01-17 PROCEDURE — 97110 THERAPEUTIC EXERCISES: CPT

## 2020-01-17 NOTE — PROGRESS NOTES
Barry Inch  : 1950  [place]  Secondary: Dashawn Roy at [address], [address] [place] [address],[address], [place] [address].  [phone]   [phone]        OUTPATIENT PHYSICAL THERAPY: Daily Treatment Note 2020  Visit Count:  2    ICD-10: Treatment Diagnosis:   · Pain in left knee (M25.562)  · Stiffness of left knee, not elsewhere classified (M25.662)  · Pain in right knee (M25.561)  · Stiffness of right knee, not elsewhere classified (M25.661)  Precautions/Allergies:   Latex; Adhesive; Lortab [hydrocodone-acetaminophen]; and Oxycontin [oxycodone]   TREATMENT PLAN:  Effective Dates: 1/15/2020 TO 2020 (90 days). Frequency/Duration: 2 times a week for 90 Day(s)    Pre-treatment Symptoms/Complaints:  2020: Patient reports he feels less swelling in his knee today. Pain: Initial: Pain Intensity 1: 4  Pain Location 1: Knee  Pain Orientation 1: Right, Left  Pain Intervention(s) 1: Rest, Medication (see MAR)  Post Session:  3/10   Medications Last Reviewed:  2020  Updated Objective Findings:  None Today  TREATMENT:     THERAPEUTIC EXERCISE: (30 minutes):  Exercises per grid below to improve mobility, strength, balance and coordination. Required minimal visual, verbal and manual cues to promote proper body alignment, promote proper body posture and promote proper body mechanics. Progressed resistance, range, repetitions and complexity of movement as indicated.    Date:  2020   Activity/Exercise Parameters   Nu-step 8 minutes  Level 5   Step ups 6 inch  15 reps  L LE leading  No UE support   Step downs 4 inch  15 reps  L LE on step  1 UE support   Tiltboard: right/left 20 reps  No UE support   Tiltboard: forward/backward 20 reps  No UE support   Calf stretch on slant board 10 reps  3 second hold  B FRIEDA   Leg press 60 pounds  L LE  20 reps    160 pounds  B LE  20 reps   Leg curls 30 pounds  L LE  20 reps    60 pounds  B LE  20 reps   Leg extension 30 pounds  L LE  20 reps    60 pounds  B LE  20 reps      MANUAL THERAPY: (10 minutes): Joint mobilization and Soft tissue mobilization was utilized and necessary because of the patient's restricted joint motion, loss of articular motion and restricted motion of soft tissue. Treatment/Session Summary:    · Response to Treatment:  Patient completed all activities with improving overall mobility and pain. · Communication/Consultation:  None today  · Equipment provided today:  None today  · Recommendations/Intent for next treatment session: Next visit will focus on improving overall mobility and pain with daily tasks.     Total Treatment Billable Duration:  45 minutes  PT Patient Time In/Time Out  Time In: 2237  Time Out: HERRERA Goodwin    Future Appointments   Date Time Provider Mandi Brown   1/17/2020  1:45 PM Abdifatah Broad Run, PT [facility]   1/23/2020  1:45 PM Abdifatah Broad Run, PT [facility]   1/27/2020  1:45 PM Abdifatah Broad Run, PT [facility]   1/29/2020  1:45 PM Abdifatah Broad Run, PT [facility]   2/4/2020  1:45 PM Abdifatah Broad Run, PT [facility]   2/6/2020  1:45 PM Abdifatah Broad Run, PT [facility]   2/11/2020  1:45 PM Abdifatah Broad Run, PT [facility]   2/13/2020  1:45 PM Jairo Peterson, PT [facility]

## 2020-01-23 ENCOUNTER — HOSPITAL ENCOUNTER (OUTPATIENT)
Dept: PHYSICAL THERAPY | Age: 70
Discharge: HOME | End: 2020-01-23
Payer: MEDICARE

## 2020-01-23 PROCEDURE — 97110 THERAPEUTIC EXERCISES: CPT

## 2020-01-23 PROCEDURE — 97140 MANUAL THERAPY 1/> REGIONS: CPT

## 2020-01-23 NOTE — PROGRESS NOTES
Anca Both  : 1950  [place]  Secondary: Dashawn Roy at [place] [address] [facility], [address] [place] [address],[address] [facility], [facility] [place].  [phone]   [phone]        OUTPATIENT PHYSICAL THERAPY: Daily Treatment Note 2020  Visit Count:  3    ICD-10: Treatment Diagnosis:   · Pain in left knee (M25.562)  · Stiffness of left knee, not elsewhere classified (M25.662)  · Pain in right knee (M25.561)  · Stiffness of right knee, not elsewhere classified (M25.661)  Precautions/Allergies:   Latex; Adhesive; Lortab [hydrocodone-acetaminophen]; and Oxycontin [oxycodone]   TREATMENT PLAN:  Effective Dates: 1/15/2020 TO 2020 (90 days). Frequency/Duration: 2 times a week for 90 Day(s)    Pre-treatment Symptoms/Complaints:  2020: Patient reports he took some advil last night and slept almost 7 hours. Pain: Initial: Pain Intensity 1: 4  Pain Location 1: Knee  Pain Orientation 1: Right, Left  Pain Intervention(s) 1: Rest, Medication (see MAR)  Post Session:  3/10   Medications Last Reviewed:  2020  Updated Objective Findings:  None Today  TREATMENT:     THERAPEUTIC EXERCISE: (30 minutes):  Exercises per grid below to improve mobility, strength, balance and coordination. Required minimal visual, verbal and manual cues to promote proper body alignment, promote proper body posture and promote proper body mechanics. Progressed resistance, range, repetitions and complexity of movement as indicated.    Date:  2020   Activity/Exercise Parameters   Nu-step 8 minutes  Level 5   Step ups 6 inch  15 reps  L LE leading  No UE support   Step downs 4 inch  15 reps  L LE on step  1 UE support   Tiltboard: right/left 20 reps  No UE support   Tiltboard: forward/backward 20 reps  No UE support   Calf stretch on slant board 10 reps  3 second hold  B FRIEDA   Leg press 60 pounds  L LE  20 reps    160 pounds  B LE  20 reps   Leg curls 30 pounds  L LE  20 reps    60 pounds  B LE  20 reps   Leg extension 30 pounds  L LE  20 reps    60 pounds  B LE  20 reps      MANUAL THERAPY: (10 minutes): Joint mobilization and Soft tissue mobilization was utilized and necessary because of the patient's restricted joint motion, loss of articular motion and restricted motion of soft tissue. Treatment/Session Summary:    · Response to Treatment:  Patient completed all activities with improving overall mobility and pain. · Communication/Consultation:  None today  · Equipment provided today:  None today  · Recommendations/Intent for next treatment session: Next visit will focus on improving overall mobility and pain with daily tasks.     Total Treatment Billable Duration:  45 minutes  PT Patient Time In/Time Out  Time In: 1942  Time Out: HERRERA Goodwin    Future Appointments   Date Time Provider Mandi Brown   1/23/2020  1:45 PM Mabel Olema, PT [facility]   1/27/2020  1:45 PM Mabel Olema, PT [facility]   1/29/2020  1:45 PM Mabel Olema, PT [facility]   2/4/2020  1:45 PM Mabel Olema, PT [facility]   2/6/2020  1:45 PM Mabel Olema, PT [facility]   2/11/2020  1:45 PM Mabel Olema, PT [facility]   2/13/2020  1:45 PM Mirella Peterson, PT [facility]

## 2020-01-27 ENCOUNTER — HOSPITAL ENCOUNTER (OUTPATIENT)
Dept: PHYSICAL THERAPY | Age: 70
Discharge: HOME | End: 2020-01-27
Payer: MEDICARE

## 2020-01-27 PROCEDURE — 97140 MANUAL THERAPY 1/> REGIONS: CPT

## 2020-01-27 PROCEDURE — 97110 THERAPEUTIC EXERCISES: CPT

## 2020-01-29 ENCOUNTER — HOSPITAL ENCOUNTER (OUTPATIENT)
Dept: PHYSICAL THERAPY | Age: 70
Discharge: HOME | End: 2020-01-29
Payer: MEDICARE

## 2020-01-29 PROCEDURE — 97140 MANUAL THERAPY 1/> REGIONS: CPT

## 2020-01-29 PROCEDURE — 97110 THERAPEUTIC EXERCISES: CPT

## 2020-01-29 NOTE — PROGRESS NOTES
Nic Robles  : 1950  [place]  Secondary: Dashawn Roy at [place]  [address], [address], [address] [facility]  [phone]   [phone]        OUTPATIENT PHYSICAL THERAPY: Daily Treatment Note 2020  Visit Count:  5    ICD-10: Treatment Diagnosis:   · Pain in left knee (M25.562)  · Stiffness of left knee, not elsewhere classified (M25.662)  · Pain in right knee (M25.561)  · Stiffness of right knee, not elsewhere classified (M25.661)  Precautions/Allergies:   Latex; Adhesive; Lortab [hydrocodone-acetaminophen]; and Oxycontin [oxycodone]   TREATMENT PLAN:  Effective Dates: 1/15/2020 TO 2020 (90 days). Frequency/Duration: 2 times a week for 90 Day(s)    Pre-treatment Symptoms/Complaints:  2020: Patient reports he is doing ok today. Pain: Initial: Pain Intensity 1: 4  Pain Location 1: Knee  Pain Orientation 1: Right, Left  Pain Intervention(s) 1: Rest, Medication (see MAR)  Post Session:  3/10   Medications Last Reviewed:  2020  Updated Objective Findings:  None Today  TREATMENT:     THERAPEUTIC EXERCISE: (30 minutes):  Exercises per grid below to improve mobility, strength, balance and coordination. Required minimal visual, verbal and manual cues to promote proper body alignment, promote proper body posture and promote proper body mechanics. Progressed resistance, range, repetitions and complexity of movement as indicated.    Date:  2020   Activity/Exercise Parameters   Nu-step 8 minutes  Level 5   Step ups 6 inch  15 reps  L LE leading  No UE support   Step downs 4 inch  15 reps  L LE on step  1 UE support   Tiltboard: right/left 20 reps  No UE support   Tiltboard: forward/backward 20 reps  No UE support   Calf stretch on slant board 10 reps  3 second hold  B FRIEDA   Leg press 60 pounds  L LE  20 reps    160 pounds  B LE  20 reps   Leg curls 30 pounds  L LE  20 reps    60 pounds  B LE  20 reps   Leg extension 30 pounds  L LE  20 reps    60 pounds  B LE  20 reps      MANUAL THERAPY: (10 minutes): Joint mobilization and Soft tissue mobilization was utilized and necessary because of the patient's restricted joint motion, loss of articular motion and restricted motion of soft tissue. Treatment/Session Summary:    · Response to Treatment:  Patient tolerated treatment well with decreased pain after treatment. · Communication/Consultation:  None today  · Equipment provided today:  None today  · Recommendations/Intent for next treatment session: Next visit will focus on improving overall mobility and pain with daily tasks.     Total Treatment Billable Duration:  40 minutes  PT Patient Time In/Time Out  Time In: 6011  Time Out: HERRERA Goodwin    Future Appointments   Date Time Provider Mandi Brown   1/29/2020  1:45 PM Jyotsna Espinoza, PT [facility]   2/4/2020  1:45 PM Jyotsna Espinoza, PT [facility]   2/6/2020  1:45 PM Jyotsna Espinoza PT [facility]   2/11/2020  1:45 PM Jyotsna Espinoza, PT [facility]   2/13/2020  1:45 PM Ham Peterson, PT [facility]

## 2020-02-04 ENCOUNTER — HOSPITAL ENCOUNTER (OUTPATIENT)
Dept: PHYSICAL THERAPY | Age: 70
Discharge: HOME | End: 2020-02-04
Payer: MEDICARE

## 2020-02-04 PROCEDURE — 97140 MANUAL THERAPY 1/> REGIONS: CPT

## 2020-02-04 PROCEDURE — 97110 THERAPEUTIC EXERCISES: CPT

## 2020-02-04 NOTE — PROGRESS NOTES
Sae Balderrama  : 1950  [place]  Secondary: Dashawn Roy at [address] [id number] [facility], [address] [place] [address],[address], [address] [facility]  [phone]   [phone]        OUTPATIENT PHYSICAL THERAPY: Daily Treatment Note 2020  Visit Count:  6    ICD-10: Treatment Diagnosis:   · Pain in left knee (M25.562)  · Stiffness of left knee, not elsewhere classified (M25.662)  · Pain in right knee (M25.561)  · Stiffness of right knee, not elsewhere classified (M25.661)  Precautions/Allergies:   Latex; Adhesive; Lortab [hydrocodone-acetaminophen]; and Oxycontin [oxycodone]   TREATMENT PLAN:  Effective Dates: 1/15/2020 TO 2020 (90 days). Frequency/Duration: 2 times a week for 90 Day(s)    Pre-treatment Symptoms/Complaints:  2020: Patient reports he slept well the night after therapy, but hasn't felt well since then because of the pain in his knee. Pain: Initial: Pain Intensity 1: 4  Pain Location 1: Knee  Pain Orientation 1: Right, Left  Pain Intervention(s) 1: Rest, Medication (see MAR)  Post Session:  3/10   Medications Last Reviewed:  2020  Updated Objective Findings:  None Today  TREATMENT:     THERAPEUTIC EXERCISE: (30 minutes):  Exercises per grid below to improve mobility, strength, balance and coordination. Required minimal visual, verbal and manual cues to promote proper body alignment, promote proper body posture and promote proper body mechanics. Progressed resistance, range, repetitions and complexity of movement as indicated.    Date:  2020   Activity/Exercise Parameters   Nu-step 8 minutes  Level 5   Step ups 6 inch  15 reps  L LE leading  No UE support   Step downs 4 inch  15 reps  L LE on step  1 UE support   Tiltboard: right/left 20 reps  No UE support   Tiltboard: forward/backward 20 reps  No UE support   Calf stretch on slant board 10 reps  3 second hold  B FRIEDA   Leg press 60 pounds  L LE  20 reps    160 pounds  B LE  20 reps   Leg curls 30 pounds  L LE  20 reps    60 pounds  B LE  20 reps   Leg extension 30 pounds  L LE  20 reps    60 pounds  B LE  20 reps      MANUAL THERAPY: (10 minutes): Joint mobilization and Soft tissue mobilization was utilized and necessary because of the patient's restricted joint motion, loss of articular motion and restricted motion of soft tissue. Treatment/Session Summary:    · Response to Treatment:  Patient completed all activities with no reports of increased pain after treatment. · Communication/Consultation:  None today  · Equipment provided today:  None today  · Recommendations/Intent for next treatment session: Next visit will focus on improving overall mobility and pain with daily tasks.     Total Treatment Billable Duration:  40 minutes  PT Patient Time In/Time Out  Time In: 8882  Time Out: HERRERA Goodwin    Future Appointments   Date Time Provider Mandi Brown   2/4/2020  1:45 PM Argentina Soto PT [facility]   2/6/2020  1:45 PM Argentina Soto PT [facility]   2/11/2020  1:45 PM Argentina Soto PT [facility]   2/13/2020  1:45 PM Alicia Peterson, PT [facility]

## 2020-02-06 ENCOUNTER — HOSPITAL ENCOUNTER (OUTPATIENT)
Dept: PHYSICAL THERAPY | Age: 70
Discharge: HOME | End: 2020-02-06
Payer: MEDICARE

## 2020-02-11 ENCOUNTER — HOSPITAL ENCOUNTER (OUTPATIENT)
Dept: PHYSICAL THERAPY | Age: 70
Discharge: HOME | End: 2020-02-11
Payer: MEDICARE

## 2020-02-11 NOTE — PROGRESS NOTES
Therapy Center at [address], [address] [place] [address],[address] [place], [address]  Phone: [phone]   Fax: [phone]    OUTPATIENT DAILY NOTE    NAME/AGE/GENDER: Gay Stone is a 77 y.o. male. DATE: 2/11/2020    Patient cancelled (less than 24 hours ago) his appointment for today due to illness. Will plan to follow up on next scheduled visit.     Nancy Hernandez, PT    Future Appointments   Date Time Provider Mandi Brown   2/11/2020  1:45 PM Mason Fritz, PT [facility]   2/13/2020  1:45 PM Mason Fritz, PT [facility]   2/18/2020  2:30 PM Mason Fritz, PT [facility]   2/20/2020  2:30 PM Sushant Harrison, PT [facility]

## 2020-02-13 ENCOUNTER — HOSPITAL ENCOUNTER (OUTPATIENT)
Dept: PHYSICAL THERAPY | Age: 70
Discharge: HOME | End: 2020-02-13
Payer: MEDICARE

## 2020-02-13 PROCEDURE — 97140 MANUAL THERAPY 1/> REGIONS: CPT

## 2020-02-13 PROCEDURE — 97110 THERAPEUTIC EXERCISES: CPT

## 2020-02-13 NOTE — PROGRESS NOTES
Karen Metcalf  : 1950  [place]  Secondary: Dashawn Roy at [place] [address] [facility], [address] [place] [address],[address], [place] [address].  [phone]   [phone]           OUTPATIENT PHYSICAL THERAPY:Progress Report 2020   ICD-10: Treatment Diagnosis:   Pain in left knee (M25.562)  Stiffness of left knee, not elsewhere classified (M25.662)  Pain in right knee (M25.561)  Stiffness of right knee, not elsewhere classified (M25.661)  Precautions/Allergies:   Latex; Adhesive; Lortab [hydrocodone-acetaminophen]; and Oxycontin [oxycodone]   TREATMENT PLAN:  Effective Dates: 1/15/2020 TO 2020 (90 days). Frequency/Duration: 2 times a week for 90 Day(s) MEDICAL/REFERRING DIAGNOSIS:  knee   DATE OF ONSET: Chronic  REFERRING PHYSICIAN: Aracelis Gillespie MD MD Orders: Evaluate and Treat  Return MD Appointment: TBD     ASSESSMENT:  Mr. Erna Wilkerson presents with improving mobility and pain in bilateral knees since initial evaluation. Patient has attended a total of 7 scheduled physical therapy visits including initial evaluation on 1/15/2020. Treatment has consisted of balance and strengthening activities to improve overall mobility, pain, and performance with activities of daily living. PROBLEM LIST (Impacting functional limitations):  1. Decreased Strength  2. Decreased ADL/Functional Activities  3. Decreased Balance  4. Increased Pain  5. Decreased Activity Tolerance INTERVENTIONS PLANNED: (Treatment may consist of any combination of the following)  1. Balance Exercise  2. Cold  3. Electrical Stimulation  4. Heat  5. Home Exercise Program (HEP)  6. Manual Therapy  7. Neuromuscular Re-education/Strengthening  8. Range of Motion (ROM)  9. Therapeutic Activites  10. Therapeutic Exercise/Strengthening  11.  Ultrasound (US)     GOALS: (Goals have been discussed and agreed upon with patient.)  Short-Term Functional Goals: Time Frame: 30 days  1. Patient will be independent with home exercise program without exacerbation of symptoms or cueing needed--goal met. 2. Patient will be independent with correct sleeping positions and awareness/avoidance of aggravating positions without cueing needed--goal met. Discharge Goals: Time Frame: 90 days  1. Patient will be independent with all ADLs with minimal onset of bilateral knee pain and no deficits with daily tasks--goal ongoing. 2. Patient will report no fear avoidance with social or recreational activities due to bilateral knee pain --goal ongoing. 3. Patient will score greater than or equal to 60/80 on Lower Extremity Functional Scale with minimal effect of bilateral knee pain on patient's ability to manage every day life activities--goal ongoing. OUTCOME MEASURE:   Tool Used: Lower Extremity Functional Scale (LEFS)  Score:  Initial: 33/80 Most Recent: 48/80 (Date: 2/13/2020 )   Interpretation of Score: 20 questions each scored on a 5 point scale with 0 representing \"extreme difficulty or unable to perform\" and 4 representing \"no difficulty\". The lower the score, the greater the functional disability. 80/80 represents no disability. Minimal detectable change is 9 points. MEDICAL NECESSITY:   · Patient is expected to demonstrate progress in strength, range of motion, balance, coordination and functional technique to improve safety during daily tasks. · Patient demonstrates good rehab potential due to higher previous functional level. · Skilled intervention continues to be required due to decreased mobility. REASON FOR SERVICES/OTHER COMMENTS:  · Patient continues to demonstrate capacity to improve overall mobility which will increase independence and increase safety.   Total Duration:  PT Patient Time In/Time Out  Time In: 1345  Time Out: 1430    Rehabilitation Potential For Stated Goals: Good     PAIN/SUBJECTIVE:   Initial: Pain Intensity 1: 4  Pain Location 1: Knee  Pain Orientation 1: Right, Left  Pain Intervention(s) 1: Rest, Medication (see MAR)  Post Session:  4/10   HISTORY:   History of Injury/Illness (Reason for Referral):  Patient reports since he was last in physical therapy several years ago, he has had lung cancer. He reports he was treated with radiation for several sessions. He reports his last PET scan was negative. He reports he went to pulmonary rehab after for several months. He reports he had returned to working out at [employer], but his knee has never felt the same after he fell not long after his initial TKA. He reports this revision found that the original lower part of the joint was loose. He reports he was in surgery for an hour and a half. He reports that he did therapy in the hospital and then did home health for several weeks. He reports that he still has some pain and discomfort in his joint and is very tender along the inside of his knee to where it feels like a nerve is on fire at times. He reports he only sleeps a few hours at a time because he can't stand for anything to touch his knee and he is normally a side-sleeper. He reports he just wants to feel better, sleep through the night, and get back to a normal routine of working out at the gym. 2/13/2020 (Progress Note): Patient reports he had a good bit of pain last week, but this week seems to be doing much better overall. He reports he is sleeping better and going to the gym again. Past Medical History/Comorbidities:   Mr. Ashley Luevano  has a past medical history of Arthritis, BMI 33.0-33.9,adult, CAD (coronary artery disease), Chronic pain, COPD, Diabetes ([address] Dyslipidemia, Former smoker, Heart failure ([address] History of echocardiogram (12/14/2018), Hypertension, Lung cancer ([address] N&V (nausea and vomiting) (04/23/2015), and Unspecified sleep apnea.  He also has no past medical history of Adverse effect of anesthesia, Aneurysm ([address] Arrhythmia, Asthma, Autoimmune disease ([address] Cancer ([address] Chronic kidney disease, Coagulation defects, Coagulation disorder ([address] Difficult intubation, Endocarditis, GERD (gastroesophageal reflux disease), Ill-defined condition, Liver disease, Malignant hyperthermia due to anesthesia, Morbid obesity ([address] Nicotine vapor product user, Non-nicotine vapor product user, Pseudocholinesterase deficiency, Psychiatric disorder, PUD (peptic ulcer disease), Rheumatic fever, Seizures ([address] Stroke ([address] Thromboembolus ([address] Thyroid disease, or Unspecified adverse effect of anesthesia. Mr. Leeanna Small  has a past surgical history that includes hx orthopaedic (Left, 1975); hx shoulder arthroscopy (Left, 2004); hx shoulder arthroscopy (Right, 2010); hx knee arthroscopy (Left, 2014); pr cardiac surg procedure unlist (2006); hx cataract removal (Left, 2015); hx knee replacement (Left, 04/21/2015); hx colonoscopy (2017); and hx retinal detachment repair (Left). Social History/Living Environment:   Home Environment: Private residence  Living Alone: No  Support Systems: Friends \ neighbors, Spouse/Significant Other/Partner  Prior Level of Function/Work/Activity:  Independent  Dominant Side:         RIGHT  Personal Factors:          Sex:  male        Age:  77 y.o. Current Medications:       Current Outpatient Medications:     gabapentin (NEURONTIN) 100 mg capsule, Take 100 mg by mouth two (2) times a day., Disp: , Rfl:     methocarbamol (ROBAXIN) 750 mg tablet, Take 750 mg by mouth four (4) times daily as needed (muscle spasm). , Disp: , Rfl:     HYDROmorphone (DILAUDID) 2 mg tablet, Take 2 mg by mouth every four (4) hours as needed for Pain., Disp: , Rfl:     losartan (COZAAR) 100 mg tablet, Take 100 mg by mouth daily. Indications: high blood pressure, Disp: , Rfl:     atorvastatin (LIPITOR) 40 mg tablet, Take 40 mg by mouth nightly. Indications: high cholesterol, Disp: , Rfl:     revefenacin (YUPELRI) 175 mcg/3 mL nebu nebulizer solution, 175 mcg by Nebulization route daily (after lunch). Indications: a chronic lung disease where the airways become partially blocked with mucus called COPD, Disp: , Rfl:     budesonide (PULMICORT) 0.5 mg/2 mL nbsp, 500 mcg by Nebulization route every twelve (12) hours. Take / use AM day of surgery  per anesthesia protocols. Indications: worsening of debilitating chronic lung disease called COPD, Disp: , Rfl:     formoterol (PERFOROMIST) 20 mcg/2 mL nebu neb solution, 20 mcg by Nebulization route every twelve (12) hours. Take / use AM day of surgery  per anesthesia protocols. , Disp: , Rfl:     multivitamin (ONE A DAY) tablet, Take 1 Tab by mouth daily. , Disp: , Rfl:     albuterol (PROVENTIL HFA, VENTOLIN HFA) 90 mcg/actuation inhaler, Take 2 Puffs by inhalation every four (4) hours as needed for Wheezing or Shortness of Breath. Take if needed DOS per anesthesia protocol. Bring DOS  Indications: CHRONIC OBSTRUCTIVE PULMONARY DISEASE, Disp: , Rfl:     carvedilol (COREG) 3.125 mg tablet, Take 3.125 mg by mouth every twelve (12) hours. Take / use AM day of surgery  per anesthesia protocols. , Disp: , Rfl:    Date Last Reviewed:  2/13/2020    EXAMINATION:   Observation/Orthostatic Postural Assessment:          Posture Assessment: Rounded shoulders, Forward head   Palpation:          Mild swelling noted along lateral portion of left knee; extreme tenderness to light touch along lower medial portion of left knee.   ROM:         L LE Assessment (AROM):   · Hip Flexion: 80 degrees    · Hip Extension: 10 degrees    · Hip Abduction: 30 degrees    · Hip Adduction: 0 degrees    · Knee Flexion: 105 degrees    · Knee Extension: 0 degrees     Strength:         R LE Assessment (Strength):   · Hip Flexion: 3+/5 with manual muscle testing    · Hip Extension: 3+/5 with manual muscle testing    · Hip Abduction: 3+/5 with manual muscle testing    · Hip Adduction: 3+/5 with manual muscle testing    · Knee Flexion: 3/5 with manual muscle testing    · Knee Extension: 3/5 with manual muscle testing     Special Tests:          Negative  Neurological Screen:        Dermatomes:   Within normal limits        Reflexes:  2+  Functional Mobility:         Gait/Mobility:    Base of Support: Center of gravity altered  Speed/Enriqueta: Pace decreased (<100 feet/min)  Step Length: Left shortened  Swing Pattern: Left asymmetrical, Right asymmetrical  Stance: Left decreased, Right increased  Gait Abnormalities: Antalgic  Ambulation - Level of Assistance: Independent  · Interventions: Verbal cues, Safety awareness training          Transfers:     Sit to Stand: Modified independent  Stand to Sit: Modified independent  Stand Pivot Transfers: Modified independent  Bed to Chair: Modified independent  Lateral Transfers: Modified independent         Bed Mobility:     Rolling: Independent  Supine to Sit: Independent  Sit to Supine: Independent  Scooting: Independent

## 2020-02-13 NOTE — PROGRESS NOTES
Laura Adams  : 1950  [place]  Secondary: Dashawn Roy at [address], [address] [place] [address],[address], [place] [address].  [phone]   [phone]        OUTPATIENT PHYSICAL THERAPY: Daily Treatment Note 2020  Visit Count:  7    ICD-10: Treatment Diagnosis:   · Pain in left knee (M25.562)  · Stiffness of left knee, not elsewhere classified (M25.662)  · Pain in right knee (M25.561)  · Stiffness of right knee, not elsewhere classified (M25.661)  Precautions/Allergies:   Latex; Adhesive; Lortab [hydrocodone-acetaminophen]; and Oxycontin [oxycodone]   TREATMENT PLAN:  Effective Dates: 1/15/2020 TO 2020 (90 days). Frequency/Duration: 2 times a week for 90 Day(s)    Pre-treatment Symptoms/Complaints:  2020: Patient reports he is doing better this week. Pain: Initial: Pain Intensity 1: 4  Pain Location 1: Knee  Pain Orientation 1: Right, Left  Pain Intervention(s) 1: Rest, Medication (see MAR)  Post Session:  3/10   Medications Last Reviewed:  2020  Updated Objective Findings:  None Today  TREATMENT:     THERAPEUTIC EXERCISE: (30 minutes):  Exercises per grid below to improve mobility, strength, balance and coordination. Required minimal visual, verbal and manual cues to promote proper body alignment, promote proper body posture and promote proper body mechanics. Progressed resistance, range, repetitions and complexity of movement as indicated.    Date:  2020   Activity/Exercise Parameters   Nu-step 8 minutes  Level 5   Step ups 6 inch  15 reps  L LE leading  No UE support   Step downs 4 inch  15 reps  L LE on step  1 UE support   Tiltboard: right/left 20 reps  No UE support   Tiltboard: forward/backward 20 reps  No UE support   Calf stretch on slant board 10 reps  3 second hold  B FRIEDA   Leg press 60 pounds  L LE  20 reps    160 pounds  B LE  20 reps   Leg curls 30 pounds  L LE  20 reps    60 pounds  B LE  20 reps   Leg extension 30 pounds  L LE  20 reps    60 pounds  B LE  20 reps      MANUAL THERAPY: (10 minutes): Joint mobilization and Soft tissue mobilization was utilized and necessary because of the patient's restricted joint motion, loss of articular motion and restricted motion of soft tissue. Treatment/Session Summary:    · Response to Treatment:  Patient completed all activities with improving overall mobility. · Communication/Consultation:  None today  · Equipment provided today:  None today  · Recommendations/Intent for next treatment session: Next visit will focus on improving overall mobility and pain with daily tasks.     Total Treatment Billable Duration:  40 minutes  PT Patient Time In/Time Out  Time In: 1878  Time Out: 6[address] [facility], PT    Future Appointments   Date Time Provider Mandi Brown   2/13/2020  1:45 PM Christian Velazquez PT [facility]   2/18/2020  2:30 PM Christian Velazquez PT [facility]   2/20/2020  2:30 PM Kobe Peterson, PT [facility]

## 2020-02-18 ENCOUNTER — HOSPITAL ENCOUNTER (OUTPATIENT)
Dept: PHYSICAL THERAPY | Age: 70
Discharge: HOME | End: 2020-02-18
Payer: MEDICARE

## 2020-02-18 PROCEDURE — 97140 MANUAL THERAPY 1/> REGIONS: CPT

## 2020-02-18 PROCEDURE — 97110 THERAPEUTIC EXERCISES: CPT

## 2020-02-18 NOTE — PROGRESS NOTES
Leander Diaz  : 1950  [place]  Secondary: Dashawn Roy at [place] [address] [facility], [address] [place] [address],[address], [address]  [phone]   [phone]        OUTPATIENT PHYSICAL THERAPY: Daily Treatment Note 2020  Visit Count:  8    ICD-10: Treatment Diagnosis:   · Pain in left knee (M25.562)  · Stiffness of left knee, not elsewhere classified (M25.662)  · Pain in right knee (M25.561)  · Stiffness of right knee, not elsewhere classified (M25.661)  Precautions/Allergies:   Latex; Adhesive; Lortab [hydrocodone-acetaminophen]; and Oxycontin [oxycodone]   TREATMENT PLAN:  Effective Dates: 1/15/2020 TO 2020 (90 days). Frequency/Duration: 2 times a week for 90 Day(s)    Pre-treatment Symptoms/Complaints:  2020: Patient reports he is still having some pain and swelling in his left knee, but it might be the rain. Pain: Initial: Pain Intensity 1: 3  Pain Location 1: Knee  Pain Orientation 1: Right, Left  Pain Intervention(s) 1: Rest, Medication (see MAR)  Post Session:  3/10   Medications Last Reviewed:  2020  Updated Objective Findings:  None Today  TREATMENT:     THERAPEUTIC EXERCISE: (30 minutes):  Exercises per grid below to improve mobility, strength, balance and coordination. Required minimal visual, verbal and manual cues to promote proper body alignment, promote proper body posture and promote proper body mechanics. Progressed resistance, range, repetitions and complexity of movement as indicated.    Date:  2020   Activity/Exercise Parameters   Nu-step 8 minutes  Level 5   Step ups 6 inch  15 reps  L LE leading  No UE support   Step downs 4 inch  15 reps  L LE on step  1 UE support   Tiltboard: right/left 20 reps  No UE support   Tiltboard: forward/backward 20 reps  No UE support   Calf stretch on slant board 10 reps  3 second hold  B FRIEDA   Leg press 60 pounds  L LE  20 reps    160 pounds  B LE  20 reps   Leg curls 30 pounds  L LE  20 reps    60 pounds  B LE  20 reps   Leg extension 30 pounds  L LE  20 reps    60 pounds  B LE  20 reps      MANUAL THERAPY: (10 minutes): Joint mobilization and Soft tissue mobilization was utilized and necessary because of the patient's restricted joint motion, loss of articular motion and restricted motion of soft tissue. Treatment/Session Summary:    · Response to Treatment:  Patient completed all activities with improving overall mobility and pain. · Communication/Consultation:  None today  · Equipment provided today:  None today  · Recommendations/Intent for next treatment session: Next visit will focus on improving overall mobility and pain with daily tasks.     Total Treatment Billable Duration:  40 minutes  PT Patient Time In/Time Out  Time In: 1430  Time Out: [address] [facility], PT    Future Appointments   Date Time Provider Mandi Brown   2/18/2020  2:30 PM Himanshu Noon, PT [facility]   2/21/2020  1:00 PM Himanshu Noon, PT [facility]   2/26/2020  1:45 PM Himanshu Noon, PT [facility]   3/4/2020  2:30 PM Himanshu Noon, PT [facility]   3/11/2020  2:30 PM Himanshu Noon, PT [facility]   3/18/2020  2:30 PM Himanshu Noon, PT [facility]   3/25/2020  2:30 PM Ashley Peterson, PT [facility]

## 2020-02-21 ENCOUNTER — HOSPITAL ENCOUNTER (OUTPATIENT)
Dept: PHYSICAL THERAPY | Age: 70
Discharge: HOME | End: 2020-02-21
Payer: MEDICARE

## 2020-02-21 PROCEDURE — 97140 MANUAL THERAPY 1/> REGIONS: CPT

## 2020-02-21 PROCEDURE — 97110 THERAPEUTIC EXERCISES: CPT

## 2020-02-21 NOTE — PROGRESS NOTES
Alex Jhaveri  : 1950  [place]  Secondary: Dashawn Roy at [address], [address] [place] [address],[address], [facility].  [phone]   [phone]        OUTPATIENT PHYSICAL THERAPY: Daily Treatment Note 2020  Visit Count:  9    ICD-10: Treatment Diagnosis:   · Pain in left knee (M25.562)  · Stiffness of left knee, not elsewhere classified (M25.662)  · Pain in right knee (M25.561)  · Stiffness of right knee, not elsewhere classified (M25.661)  Precautions/Allergies:   Latex; Adhesive; Lortab [hydrocodone-acetaminophen]; and Oxycontin [oxycodone]   TREATMENT PLAN:  Effective Dates: 1/15/2020 TO 2020 (90 days). Frequency/Duration: 2 times a week for 90 Day(s)    Pre-treatment Symptoms/Complaints:  2020: Patient reports he is hurting all through his knee joint. Pain: Initial: Pain Intensity 1: 3  Pain Location 1: Knee  Pain Orientation 1: Right, Left  Pain Intervention(s) 1: Rest, Medication (see MAR)  Post Session:  3/10   Medications Last Reviewed:  2020  Updated Objective Findings:  None Today  TREATMENT:     THERAPEUTIC EXERCISE: (30 minutes):  Exercises per grid below to improve mobility, strength, balance and coordination. Required minimal visual, verbal and manual cues to promote proper body alignment, promote proper body posture and promote proper body mechanics. Progressed resistance, range, repetitions and complexity of movement as indicated.    Date:  2020   Activity/Exercise Parameters   Nu-step 8 minutes  Level 5   Step ups 6 inch  15 reps  L LE leading  No UE support   Step downs 4 inch  15 reps  L LE on step  1 UE support   Tiltboard: right/left 20 reps  No UE support   Tiltboard: forward/backward 20 reps  No UE support   Calf stretch on slant board 10 reps  3 second hold  B FRIEDA   Leg press 60 pounds  L LE  20 reps    160 pounds  B LE  20 reps   Leg curls 30 pounds  L LE  20 reps    60 pounds  B LE  20 reps   Leg extension 30 pounds  L LE  20 reps    60 pounds  B LE  20 reps      MANUAL THERAPY: (10 minutes): Joint mobilization and Soft tissue mobilization was utilized and necessary because of the patient's restricted joint motion, loss of articular motion and restricted motion of soft tissue. Treatment/Session Summary:    · Response to Treatment:  Patient completed all activities with improving overall mobility. · Communication/Consultation:  None today  · Equipment provided today:  None today  · Recommendations/Intent for next treatment session: Next visit will focus on improving overall mobility and pain with daily tasks.     Total Treatment Billable Duration:  40 minutes  PT Patient Time In/Time Out  Time In: 1100  Time Out: 503 [facility], PT    Future Appointments   Date Time Provider Mandi Brown   2/21/2020  1:00 PM Christian Even, PT [facility] SFE   2/26/2020  1:45 PM Christian Even, PT [facility]   3/4/2020  2:30 PM Christian Even, PT [facility]   3/11/2020  2:30 PM Christian Even, PT [facility]   3/18/2020  2:30 PM Christian Even, PT [facility]   3/25/2020  2:30 PM Kobe Peterson, PT [facility]

## 2020-02-26 ENCOUNTER — HOSPITAL ENCOUNTER (OUTPATIENT)
Dept: PHYSICAL THERAPY | Age: 70
Discharge: HOME | End: 2020-02-26
Payer: MEDICARE

## 2020-02-26 PROCEDURE — 97140 MANUAL THERAPY 1/> REGIONS: CPT

## 2020-02-26 PROCEDURE — 97110 THERAPEUTIC EXERCISES: CPT

## 2020-02-26 NOTE — PROGRESS NOTES
Jorge Stafford  : 1950  [place]  Secondary: Dashawn Roy at [place] [address] [facility], [address] [place] [address],[address], [address]  [phone]   [phone]        OUTPATIENT PHYSICAL THERAPY: Daily Treatment Note 2020  Visit Count:  10    ICD-10: Treatment Diagnosis:   · Pain in left knee (M25.562)  · Stiffness of left knee, not elsewhere classified (M25.662)  · Pain in right knee (M25.561)  · Stiffness of right knee, not elsewhere classified (M25.661)  Precautions/Allergies:   Latex; Adhesive; Lortab [hydrocodone-acetaminophen]; and Oxycontin [oxycodone]   TREATMENT PLAN:  Effective Dates: 1/15/2020 TO 2020 (90 days). Frequency/Duration: 2 times a week for 90 Day(s)    Pre-treatment Symptoms/Complaints:  2020: Patient reports he went to see Dr. Rosalio Hatfield and he told him to back off the gym, but keep walking and keep doing therapy. He reports the knee is about the same today. Pain: Initial: Pain Intensity 1: 3  Pain Location 1: Knee  Pain Orientation 1: Right, Left  Pain Intervention(s) 1: Rest, Medication (see MAR)  Post Session:  3/10   Medications Last Reviewed:  2020  Updated Objective Findings:  None Today  TREATMENT:     THERAPEUTIC EXERCISE: (30 minutes):  Exercises per grid below to improve mobility, strength, balance and coordination. Required minimal visual, verbal and manual cues to promote proper body alignment, promote proper body posture and promote proper body mechanics. Progressed resistance, range, repetitions and complexity of movement as indicated.    Date:  2020   Activity/Exercise Parameters   Nu-step 8 minutes  Level 5   Step ups 6 inch  15 reps  L LE leading  No UE support   Step downs 4 inch  15 reps  L LE on step  1 UE support   Tiltboard: right/left 20 reps  No UE support   Tiltboard: forward/backward 20 reps  No UE support   Calf stretch on slant board 10 reps  3 second hold  B FRIEDA   Leg press 60 pounds  L LE  20 reps    160 pounds  B LE  20 reps   Leg curls 30 pounds  L LE  20 reps    60 pounds  B LE  20 reps   Leg extension 30 pounds  L LE  20 reps    60 pounds  B LE  20 reps      MANUAL THERAPY: (10 minutes): Joint mobilization and Soft tissue mobilization was utilized and necessary because of the patient's restricted joint motion, loss of articular motion and restricted motion of soft tissue. Treatment/Session Summary:    · Response to Treatment:  Patient completed all activities with improving overall mobility. · Communication/Consultation:  None today  · Equipment provided today:  None today  · Recommendations/Intent for next treatment session: Next visit will focus on improving overall mobility and pain with daily tasks.     Total Treatment Billable Duration:  40 minutes  PT Patient Time In/Time Out  Time In: 0425  Time Out: HERRERA Goodwin    Future Appointments   Date Time Provider Mandi Brown   2/26/2020  1:45 PM Linda Raygoza PT [facility]   3/4/2020  2:30 PM Linda Raygoza PT [facility]   3/11/2020  2:30 PM Linda Raygoza, PT [facility]   3/18/2020  2:30 PM Linda Raygoza PT [facility]   3/25/2020  2:30 PM Manda Petesron, PT [facility]

## 2020-03-03 PROBLEM — T84.9XXA: Status: ACTIVE | Noted: 2020-03-03

## 2020-03-03 PROBLEM — Z96.652 PRESENCE OF LEFT ARTIFICIAL KNEE JOINT: Status: ACTIVE | Noted: 2020-03-03

## 2020-03-04 ENCOUNTER — HOSPITAL ENCOUNTER (OUTPATIENT)
Dept: PHYSICAL THERAPY | Age: 70
Discharge: HOME | End: 2020-03-04
Payer: MEDICARE

## 2020-03-04 PROCEDURE — 97140 MANUAL THERAPY 1/> REGIONS: CPT

## 2020-03-04 PROCEDURE — 97110 THERAPEUTIC EXERCISES: CPT

## 2020-03-04 NOTE — PROGRESS NOTES
Jesus Overton  : 1950  [place]  Secondary: Dashawn [address] 76 at [address] [facility] [address], [address] [facility],[address], [facility] [address]  [phone]   [phone]        OUTPATIENT PHYSICAL THERAPY: Daily Treatment Note 3/4/2020  Visit Count:  11    ICD-10: Treatment Diagnosis:   · Pain in left knee (M25.562)  · Stiffness of left knee, not elsewhere classified (M25.662)  · Pain in right knee (M25.561)  · Stiffness of right knee, not elsewhere classified (M25.661)  Precautions/Allergies:   Latex; Adhesive; Lortab [hydrocodone-acetaminophen]; and Oxycontin [oxycodone]   TREATMENT PLAN:  Effective Dates: 1/15/2020 TO 2020 (90 days). Frequency/Duration: 2 times a week for 90 Day(s)    Pre-treatment Symptoms/Complaints:  3/4/2020: Patient reports he is feeling better overall. He reports his knee pain hasn't been that bad until the past few days. Pain: Initial: Pain Intensity 1: 3  Pain Location 1: Knee  Pain Orientation 1: Right, Left  Pain Intervention(s) 1: Rest, Medication (see MAR)  Post Session:  3/10   Medications Last Reviewed:  3/4/2020  Updated Objective Findings:  None Today  TREATMENT:     THERAPEUTIC EXERCISE: (30 minutes):  Exercises per grid below to improve mobility, strength, balance and coordination. Required minimal visual, verbal and manual cues to promote proper body alignment, promote proper body posture and promote proper body mechanics. Progressed resistance, range, repetitions and complexity of movement as indicated.    Date:  3/4/2020   Activity/Exercise Parameters   Nu-step 8 minutes  Level 5   Step ups 6 inch  15 reps  L LE leading  No UE support   Step downs 4 inch  15 reps  L LE on step  1 UE support   Tiltboard: right/left 20 reps  No UE support   Tiltboard: forward/backward 20 reps  No UE support   Calf stretch on slant board 10 reps  3 second hold  B FRIEDA   Leg press 130 pounds  B LE  20 reps   Leg curls 50 pounds  B LE  20 reps   Leg extension 50 pounds  B LE  20 reps      MANUAL THERAPY: (10 minutes): Joint mobilization and Soft tissue mobilization was utilized and necessary because of the patient's restricted joint motion, loss of articular motion and restricted motion of soft tissue. Treatment/Session Summary:    · Response to Treatment:  Patient completed all activities with improved overall mobility after treatment. · Communication/Consultation:  None today  · Equipment provided today:  None today  · Recommendations/Intent for next treatment session: Next visit will focus on improving overall mobility and pain with daily tasks.     Total Treatment Billable Duration:  40 minutes  PT Patient Time In/Time Out  Time In: 1430  Time Out: [id number] [facility], PT    Future Appointments   Date Time Provider Mandi Coradoisti   3/4/2020  2:30 PM Susan Hernández, PT [facility] SFE   3/11/2020  2:30 PM Susan Hernández, PT [facility]   3/18/2020  2:30 PM Susan Hernández, PT [facility]   3/25/2020  2:30 PM [facility], PT [facility]

## 2020-03-11 ENCOUNTER — HOSPITAL ENCOUNTER (OUTPATIENT)
Dept: PHYSICAL THERAPY | Age: 70
Discharge: HOME | End: 2020-03-11
Payer: MEDICARE

## 2020-03-11 NOTE — PROGRESS NOTES
Therapy Center at [address], [address] [place] [address],[address] [place], [address]  Phone: [phone]   Fax: [phone]    OUTPATIENT DAILY NOTE    NAME/AGE/GENDER: Ann-Marie Marcelo is a 77 y.o. male. DATE: 3/11/2020    Patient cancelled (more than 24 hours ago) his appointment for today due to reasons not given. Will plan to follow up on next scheduled visit.     Maureen Saini, PT    Future Appointments   Date Time Provider Mandi Brown   3/11/2020  7:00 PM Darian Yang [facility] SFE   3/18/2020  2:30 PM Darian Yang PT [facility] SFE   3/25/2020  2:30 PM Darian Yang, PT [facility]   4/1/2020  2:30 PM Teodora Walton, PT [facility]

## 2020-03-18 ENCOUNTER — HOSPITAL ENCOUNTER (OUTPATIENT)
Dept: PHYSICAL THERAPY | Age: 70
Discharge: HOME | End: 2020-03-18
Payer: MEDICARE

## 2020-03-18 PROCEDURE — 97140 MANUAL THERAPY 1/> REGIONS: CPT

## 2020-03-18 PROCEDURE — 97110 THERAPEUTIC EXERCISES: CPT

## 2020-03-18 NOTE — PROGRESS NOTES
Nic Robles  : 1950  [place]  Secondary: Dashawn Roy at [place] [id number] [facility], [address] [place] [address],[address], [facility].  [phone]   [phone]        OUTPATIENT PHYSICAL THERAPY: Daily Treatment Note 3/18/2020  Visit Count:  12    ICD-10: Treatment Diagnosis:   · Pain in left knee (M25.562)  · Stiffness of left knee, not elsewhere classified (M25.662)  · Pain in right knee (M25.561)  · Stiffness of right knee, not elsewhere classified (M25.661)  Precautions/Allergies:   Latex; Adhesive; Lortab [hydrocodone-acetaminophen]; and Oxycontin [oxycodone]   TREATMENT PLAN:  Effective Dates: 1/15/2020 TO 2020 (90 days). Frequency/Duration: 2 times a week for 90 Day(s)    Pre-treatment Symptoms/Complaints:  3/18/2020: Patient reports he is hurting on the inside and outside of his left knee. Pain: Initial: Pain Intensity 1: 3  Pain Location 1: Knee  Pain Orientation 1: Right, Left  Pain Intervention(s) 1: Rest, Medication (see MAR)  Post Session:  3/10   Medications Last Reviewed:  3/18/2020  Updated Objective Findings:  None Today  TREATMENT:     THERAPEUTIC EXERCISE: (30 minutes):  Exercises per grid below to improve mobility, strength, balance and coordination. Required minimal visual, verbal and manual cues to promote proper body alignment, promote proper body posture and promote proper body mechanics. Progressed resistance, range, repetitions and complexity of movement as indicated.    Date:  3/18/2020   Activity/Exercise Parameters   Nu-step 8 minutes  Level 5   Step ups 6 inch  15 reps  L LE leading  No UE support   Step downs 4 inch  15 reps  L LE on step  1 UE support   Tiltboard: right/left 20 reps  No UE support   Tiltboard: forward/backward 20 reps  No UE support   Calf stretch on slant board 10 reps  3 second hold  B FRIEDA   Leg press 130 pounds  B LE  20 reps   Leg curls 50 pounds  B LE  20 reps   Leg extension 50 pounds  B LE  20 reps      MANUAL THERAPY: (10 minutes): Joint mobilization and Soft tissue mobilization was utilized and necessary because of the patient's restricted joint motion, loss of articular motion and restricted motion of soft tissue. Treatment/Session Summary:    · Response to Treatment:  Patient completed all activities. Patient informed that all remaining visits in march will be cancelled secondary to concerns about spreading the coronavirus. · Communication/Consultation:  None today  · Equipment provided today:  None today  · Recommendations/Intent for next treatment session: Next visit will focus on improving overall mobility and pain with daily tasks.     Total Treatment Billable Duration:  40 minutes  PT Patient Time In/Time Out  Time In: 1430  Time Out: [id number] [facility], PT    Future Appointments   Date Time Provider Mandi Brown   3/25/2020  7:00 PM Edu Yousif [facility]   4/1/2020  2:30 PM Stanley Peterson, PT [facility] E

## 2020-05-01 NOTE — THERAPY DISCHARGE
Beltran Lawler : 1950 [place] Secondary: Dashawn Roy at [address], [address] [facility], [address] [place] [address] [phone]   [phone] OUTPATIENT PHYSICAL THERAPY:Discontinuation Summary 3/4/2020 ICD-10: Treatment Diagnosis:  
Pain in left knee (M25.562) Stiffness of left knee, not elsewhere classified (M25.662) Pain in right knee (M25.561) Stiffness of right knee, not elsewhere classified (M25.661) Precautions/Allergies:  
Latex; Adhesive; Lortab [hydrocodone-acetaminophen]; and Oxycontin [oxycodone] TREATMENT PLAN: 
Effective Dates: 1/15/2020 TO 2020 (90 days). Frequency/Duration: 2 times a week for 90 Day(s) MEDICAL/REFERRING DIAGNOSIS: 
knee DATE OF ONSET: Chronic REFERRING PHYSICIAN: Ian Thrasher MD MD Orders: Evaluate and Treat Return MD Appointment: TBD ASSESSMENT:  Mr. Natalia Main presented with improving mobility and pain in bilateral knees since initial evaluation. Patient attended a total of 11 scheduled physical therapy visits including initial evaluation on 1/15/2020. Treatment consisted of balance and strengthening activities to improve overall mobility, pain, and performance with activities of daily living. Patient will not attend any additional physical therapy visits secondary to feeling better. Patient's therapy will be discontinued at this time. We will be happy to re-assess him with a change in his status and a new order from his doctor. Thank you for the opportunity to serve this patient. GOALS: (Goals have been discussed and agreed upon with patient.) Short-Term Functional Goals: Time Frame: 30 days 1. Patient will be independent with home exercise program without exacerbation of symptoms or cueing needed--goal met.   
2. Patient will be independent with correct sleeping positions and awareness/avoidance of aggravating positions without cueing needed--goal met. Discharge Goals: Time Frame: 90 days 1. Patient will be independent with all ADLs with minimal onset of bilateral knee pain and no deficits with daily tasks--goal met. 2. Patient will report no fear avoidance with social or recreational activities due to bilateral knee pain --goal met. 3. Patient will score greater than or equal to 60/80 on Lower Extremity Functional Scale with minimal effect of bilateral knee pain on patient's ability to manage every day life activities--goal met. OUTCOME MEASURE:  
Tool Used: Lower Extremity Functional Scale (LEFS) Score:  Initial: 33/80 Most Recent: 48/80 (Date: 2/13/2020 ) Interpretation of Score: 20 questions each scored on a 5 point scale with 0 representing \"extreme difficulty or unable to perform\" and 4 representing \"no difficulty\". The lower the score, the greater the functional disability. 80/80 represents no disability. Minimal detectable change is 9 points. EXAMINATION:  
Observation/Orthostatic Postural Assessment:   
      Posture Assessment: Rounded shoulders, Forward head Palpation:   
      Mild swelling noted along lateral portion of left knee; extreme tenderness to light touch along lower medial portion of left knee. ROM:  
      L LE Assessment (AROM): 
 · Hip Flexion: 80 degrees · Hip Extension: 10 degrees · Hip Abduction: 30 degrees · Hip Adduction: 0 degrees · Knee Flexion: 105 degrees · Knee Extension: 0 degrees Strength:   
     R LE Assessment (Strength): · Hip Flexion: 3+/5 with manual muscle testing · Hip Extension: 3+/5 with manual muscle testing · Hip Abduction: 3+/5 with manual muscle testing · Hip Adduction: 3+/5 with manual muscle testing · Knee Flexion: 3/5 with manual muscle testing · Knee Extension: 3/5 with manual muscle testing Special Tests:   
      Negative Neurological Screen: Dermatomes: Within normal limits Reflexes:  2+ Functional Mobility:  
      Gait/Mobility:  
 Base of Support: Center of gravity altered Speed/Enriqueta: Pace decreased (<100 feet/min) Step Length: Left shortened Swing Pattern: Left asymmetrical, Right asymmetrical 
Stance: Left decreased, Right increased Gait Abnormalities: Antalgic Ambulation - Level of Assistance: Independent · Interventions: Verbal cues, Safety awareness training Transfers:   
 Sit to Stand: Modified independent Stand to Sit: Modified independent Stand Pivot Transfers: Modified independent Bed to Chair: Modified independent Lateral Transfers: Modified independent Bed Mobility:   
 Rolling: Independent Supine to Sit: Independent Sit to Supine: Independent Scooting: Independent

## 2020-05-12 ENCOUNTER — HOSPITAL ENCOUNTER (OUTPATIENT)
Dept: PHYSICAL THERAPY | Age: 70
Discharge: HOME | End: 2020-05-12
Payer: MEDICARE

## 2020-05-12 PROCEDURE — 97140 MANUAL THERAPY 1/> REGIONS: CPT

## 2020-05-12 PROCEDURE — 97110 THERAPEUTIC EXERCISES: CPT

## 2020-05-12 NOTE — THERAPY RECERTIFICATION
Susana Hutchinson : 1950 [place] Secondary: Dashawn Roy at [place] [address], [address], [address] [facility] [phone]   [phone] OUTPATIENT PHYSICAL THERAPY:Recertification  ICD-10: Treatment Diagnosis:  
Pain in left knee (M25.562) Stiffness of left knee, not elsewhere classified (M25.662) Pain in right knee (M25.561) Stiffness of right knee, not elsewhere classified (M25.661) Precautions/Allergies:  
Latex; Adhesive; Lortab [hydrocodone-acetaminophen]; and Oxycontin [oxycodone] TREATMENT PLAN: 
Effective Dates: 2020 to 8/10/2020 (90 days). Frequency/Duration: 2 times a week for 90 Day(s) MEDICAL/REFERRING DIAGNOSIS: 
knee DATE OF ONSET: Chronic REFERRING PHYSICIAN: Josias Tian MD MD Orders: Evaluate and Treat Return MD Appointment: TBD  
 
RE-ASSESSMENT:  Mr. Shawn Felix presents with improving mobility and pain in bilateral knees since initial evaluation. Patient has attended a total of 13 scheduled physical therapy visits including initial evaluation on 1/15/2020. Treatment has consisted of balance and strengthening activities to improve overall mobility, pain, and performance with activities of daily living. After discussing with patient he agreed he would continue to benefit from physical therapy to improve overall mobility and pain with daily activities. Please sign this re-certification if you concur. Thank you for the opportunity to serve this patient. PROBLEM LIST (Impacting functional limitations): 1. Decreased Strength 2. Decreased ADL/Functional Activities 3. Decreased Balance 4. Increased Pain 5. Decreased Activity Tolerance INTERVENTIONS PLANNED: (Treatment may consist of any combination of the following) 1. Balance Exercise 2. Cold 3. Electrical Stimulation 4. Heat 5. Home Exercise Program (HEP) 6. Manual Therapy 7. Neuromuscular Re-education/Strengthening 8. Range of Motion (ROM) 9. Therapeutic Activites 10. Therapeutic Exercise/Strengthening 11. Ultrasound (US)  
 
GOALS: (Goals have been discussed and agreed upon with patient.) Short-Term Functional Goals: Time Frame: 30 days 1. Patient will be independent with home exercise program without exacerbation of symptoms or cueing needed--goal met. 2. Patient will be independent with correct sleeping positions and awareness/avoidance of aggravating positions without cueing needed--goal met. Discharge Goals: Time Frame: 90 days 1. Patient will be independent with all ADLs with minimal onset of bilateral knee pain and no deficits with daily tasks--goal ongoing. 2. Patient will report no fear avoidance with social or recreational activities due to bilateral knee pain --goal ongoing. 3. Patient will score greater than or equal to 60/80 on Lower Extremity Functional Scale with minimal effect of bilateral knee pain on patient's ability to manage every day life activities--goal ongoing. OUTCOME MEASURE:  
Tool Used: Lower Extremity Functional Scale (LEFS) Score:  Initial: 33/80 Most Recent: 40/80 (Date: 5/12/2020 ) Interpretation of Score: 20 questions each scored on a 5 point scale with 0 representing \"extreme difficulty or unable to perform\" and 4 representing \"no difficulty\". The lower the score, the greater the functional disability. 80/80 represents no disability. Minimal detectable change is 9 points. MEDICAL NECESSITY:  
· Patient is expected to demonstrate progress in strength, range of motion, balance, coordination and functional technique to improve safety during daily tasks. · Patient demonstrates good rehab potential due to higher previous functional level. · Skilled intervention continues to be required due to decreased mobility.  
REASON FOR SERVICES/OTHER COMMENTS: 
· Patient continues to demonstrate capacity to improve overall mobility which will increase independence and increase safety. Total Duration: PT Patient Time In/Time Out Time In: 1100 Time Out: 1145 Rehabilitation Potential For Stated Goals: Good Regarding Mauro Chavez's therapy, I certify that the treatment plan above will be carried out by a therapist or under their direction. Thank you for this referral, 
Marianne Hutchison, PT Referring Physician Signature: Kenisha Alfaro MD _______________________________ Date _____________ PAIN/SUBJECTIVE:  
Initial: Pain Intensity 1: 4 Pain Location 1: Knee Pain Orientation 1: Right, Left Pain Intervention(s) 1: Rest, Medication (see MAR)  Post Session:  4/10 HISTORY:  
History of Injury/Illness (Reason for Referral): 
Patient reports since he was last in physical therapy several years ago, he has had lung cancer. He reports he was treated with radiation for several sessions. He reports his last PET scan was negative. He reports he went to pulmonary rehab after for several months. He reports he had returned to working out at [employer], but his knee has never felt the same after he fell not long after his initial TKA. He reports this revision found that the original lower part of the joint was loose. He reports he was in surgery for an hour and a half. He reports that he did therapy in the hospital and then did home health for several weeks. He reports that he still has some pain and discomfort in his joint and is very tender along the inside of his knee to where it feels like a nerve is on fire at times. He reports he only sleeps a few hours at a time because he can't stand for anything to touch his knee and he is normally a side-sleeper. He reports he just wants to feel better, sleep through the night, and get back to a normal routine of working out at the gym. 2/13/2020 (Progress Note): Patient reports he had a good bit of pain last week, but this week seems to be doing much better overall.   He reports he is sleeping better and going to the gym again. 5/83/2631 (Re-certification): Patient reports he is still having pain in his knee. He reports that sometimes movement helps it to feel better and other times it makes his pain worse. Past Medical History/Comorbidities:  
Mr. Flaco Martell  has a past medical history of Arthritis, BMI 33.0-33.9,adult, CAD (coronary artery disease), Chronic pain, COPD, Diabetes ([address] Dyslipidemia, Former smoker, Heart failure ([address] History of echocardiogram (12/14/2018), Hypertension, Lung cancer ([address] N&V (nausea and vomiting) (04/23/2015), and Unspecified sleep apnea. He also has no past medical history of Adverse effect of anesthesia, Aneurysm ([address] Arrhythmia, Asthma, Autoimmune disease ([address] Cancer ([address] Chronic kidney disease, Coagulation defects, Coagulation disorder ([address] Difficult intubation, Endocarditis, GERD (gastroesophageal reflux disease), Ill-defined condition, Liver disease, Malignant hyperthermia due to anesthesia, Morbid obesity ([address] Nicotine vapor product user, Non-nicotine vapor product user, Pseudocholinesterase deficiency, Psychiatric disorder, PUD (peptic ulcer disease), Rheumatic fever, Seizures ([address] Stroke ([address] Thromboembolus ([address] Thyroid disease, or Unspecified adverse effect of anesthesia. Mr. Flaco Martell  has a past surgical history that includes hx orthopaedic (Left, 1975); hx shoulder arthroscopy (Left, 2004); hx shoulder arthroscopy (Right, 2010); hx knee arthroscopy (Left, 2014); pr cardiac surg procedure unlist (2006); hx cataract removal (Left, 2015); hx knee replacement (Left, 04/21/2015); hx colonoscopy (2017); and hx retinal detachment repair (Left). Social History/Living Environment:  
Home Environment: Private residence Living Alone: No 
Support Systems: Friends \ neighbors, Spouse/Significant Other/Partner Prior Level of Function/Work/Activity: 
Independent Dominant Side:  
      RIGHT Personal Factors:   
      Sex:  male Age:  77 y.o. Current Medications:   
  
Current Outpatient Medications:  
  gabapentin (NEURONTIN) 100 mg capsule, Take 100 mg by mouth two (2) times a day., Disp: , Rfl:  
  methocarbamol (ROBAXIN) 750 mg tablet, Take 750 mg by mouth four (4) times daily as needed (muscle spasm). , Disp: , Rfl:  
  HYDROmorphone (DILAUDID) 2 mg tablet, Take 2 mg by mouth every four (4) hours as needed for Pain., Disp: , Rfl:  
  losartan (COZAAR) 100 mg tablet, Take 100 mg by mouth daily. Indications: high blood pressure, Disp: , Rfl:  
  atorvastatin (LIPITOR) 40 mg tablet, Take 40 mg by mouth nightly. Indications: high cholesterol, Disp: , Rfl:  
  revefenacin (YUPELRI) 175 mcg/3 mL nebu nebulizer solution, 175 mcg by Nebulization route daily (after lunch). Indications: a chronic lung disease where the airways become partially blocked with mucus called COPD, Disp: , Rfl:  
  budesonide (PULMICORT) 0.5 mg/2 mL nbsp, 500 mcg by Nebulization route every twelve (12) hours. Take / use AM day of surgery  per anesthesia protocols. Indications: worsening of debilitating chronic lung disease called COPD, Disp: , Rfl:  
  formoterol (PERFOROMIST) 20 mcg/2 mL nebu neb solution, 20 mcg by Nebulization route every twelve (12) hours. Take / use AM day of surgery  per anesthesia protocols. , Disp: , Rfl:  
  multivitamin (ONE A DAY) tablet, Take 1 Tab by mouth daily. , Disp: , Rfl:  
  albuterol (PROVENTIL HFA, VENTOLIN HFA) 90 mcg/actuation inhaler, Take 2 Puffs by inhalation every four (4) hours as needed for Wheezing or Shortness of Breath. Take if needed DOS per anesthesia protocol. Bring DOS  Indications: CHRONIC OBSTRUCTIVE PULMONARY DISEASE, Disp: , Rfl:  
  carvedilol (COREG) 3.125 mg tablet, Take 3.125 mg by mouth every twelve (12) hours. Take / use AM day of surgery  per anesthesia protocols. , Disp: , Rfl:   
Date Last Reviewed:  5/12/2020 EXAMINATION:  
Observation/Orthostatic Postural Assessment: Posture Assessment: Rounded shoulders, Forward head Palpation:   
      Mild swelling noted along lateral portion of left knee; extreme tenderness to light touch along lower medial portion of left knee. ROM:  
      L LE Assessment (AROM): 
 · Hip Flexion: 80 degrees · Hip Extension: 10 degrees · Hip Abduction: 30 degrees · Hip Adduction: 0 degrees · Knee Flexion: 115 degrees · Knee Extension: 0 degrees Strength:   
     R LE Assessment (Strength): · Hip Flexion: 3+/5 with manual muscle testing · Hip Extension: 3+/5 with manual muscle testing · Hip Abduction: 3+/5 with manual muscle testing · Hip Adduction: 3+/5 with manual muscle testing · Knee Flexion: 3+/5 with manual muscle testing · Knee Extension: 3+/5 with manual muscle testing Special Tests:   
      Negative Neurological Screen: 
      Dermatomes: Within normal limits Reflexes:  2+ Functional Mobility:  
      Gait/Mobility:  
 Base of Support: Center of gravity altered Speed/Enriqueta: Pace decreased (<100 feet/min) Step Length: Left shortened Swing Pattern: Left asymmetrical, Right asymmetrical 
Stance: Left decreased, Right increased Gait Abnormalities: Antalgic Ambulation - Level of Assistance: Independent · Interventions: Verbal cues, Safety awareness training Transfers:   
 Sit to Stand: Modified independent Stand to Sit: Modified independent Stand Pivot Transfers: Modified independent Bed to Chair: Modified independent Lateral Transfers: Modified independent Bed Mobility:   
 Rolling: Independent Supine to Sit: Independent Sit to Supine: Independent Scooting: Independent

## 2020-05-12 NOTE — PROGRESS NOTES
Claudeen Console  : 1950  [place]  Secondary: Dashawn Roy at [place] [address],[address], [place] [address].  [phone]   [phone]        OUTPATIENT PHYSICAL THERAPY: Daily Treatment Note 2020  Visit Count:  13    ICD-10: Treatment Diagnosis:   · Pain in left knee (M25.562)  · Stiffness of left knee, not elsewhere classified (M25.662)  · Pain in right knee (M25.561)  · Stiffness of right knee, not elsewhere classified (M25.661)  Precautions/Allergies:   Latex; Adhesive; Lortab [hydrocodone-acetaminophen]; and Oxycontin [oxycodone]   TREATMENT PLAN:  Effective Dates: 2020 to 8/10/2020 (90 days). Frequency/Duration: 2 times a week for 90 Day(s)    Pre-treatment Symptoms/Complaints:  2020: Patient reports his knee is about the same. He reports pain is still in his knee with activity. Pain: Initial: Pain Intensity 1: 4  Pain Location 1: Knee  Pain Orientation 1: Right, Left  Pain Intervention(s) 1: Rest, Medication (see MAR)  Post Session: 4/10   Medications Last Reviewed:  2020  Updated Objective Findings:  None Today  TREATMENT:     THERAPEUTIC EXERCISE: (30 minutes):  Exercises per grid below to improve mobility, strength, balance and coordination. Required minimal visual, verbal and manual cues to promote proper body alignment, promote proper body posture and promote proper body mechanics. Progressed resistance, range, repetitions and complexity of movement as indicated.    Date:  2020   Activity/Exercise Parameters   Nu-step 8 minutes  Level 5   Step ups 6 inch  15 reps  L LE leading  No UE support   Step downs 4 inch  15 reps  L LE on step  1 UE support   Tiltboard: right/left 20 reps  No UE support   Tiltboard: forward/backward 20 reps  No UE support   Calf stretch on slant board 10 reps  3 second hold  B FRIEDA   Leg press 130 pounds  B LE  20 reps   Leg curls 50 pounds  B LE  20 reps   Leg extension 50 pounds  B LE  20 reps      MANUAL THERAPY: (10 minutes): Joint mobilization and Soft tissue mobilization was utilized and necessary because of the patient's restricted joint motion, loss of articular motion and restricted motion of soft tissue. Treatment/Session Summary:    · Response to Treatment:  Patient completed all activities with no reports of decreased pain after treatment. · Communication/Consultation:  None today  · Equipment provided today:  None today  · Recommendations/Intent for next treatment session: Next visit will focus on improving overall mobility and pain with daily tasks.     Total Treatment Billable Duration:  40 minutes  PT Patient Time In/Time Out  Time In: 1100  Time Out: [facility], PT    Future Appointments   Date Time Provider Mandi Brown   5/19/2020  1:00 PM David Gimenez PT [facility] MATIAS   5/26/2020  1:00 PM HERRERA Santo E

## 2020-05-19 ENCOUNTER — HOSPITAL ENCOUNTER (OUTPATIENT)
Dept: PHYSICAL THERAPY | Age: 70
Discharge: HOME | End: 2020-05-19
Payer: MEDICARE

## 2020-05-19 PROCEDURE — 97140 MANUAL THERAPY 1/> REGIONS: CPT

## 2020-05-19 PROCEDURE — 97110 THERAPEUTIC EXERCISES: CPT

## 2020-05-19 NOTE — PROGRESS NOTES
Radha Holbrook  : 1950  [place]  Secondary: Dasahwn Roy at [address], [address] [place] [address],[address], [place] [address].  [phone]   [phone]        OUTPATIENT PHYSICAL THERAPY: Daily Treatment Note 2020  Visit Count:  14    ICD-10: Treatment Diagnosis:   · Pain in left knee (M25.562)  · Stiffness of left knee, not elsewhere classified (M25.662)  · Pain in right knee (M25.561)  · Stiffness of right knee, not elsewhere classified (M25.661)  Precautions/Allergies:   Latex; Adhesive; Lortab [hydrocodone-acetaminophen]; and Oxycontin [oxycodone]   TREATMENT PLAN:  Effective Dates: 2020 to 8/10/2020 (90 days). Frequency/Duration: 2 times a week for 90 Day(s)    Pre-treatment Symptoms/Complaints:  2020: Patient reports his knee is still very stiff and sore. Pain: Initial: Pain Intensity 1: 4  Pain Location 1: Knee  Pain Orientation 1: Right, Left  Pain Intervention(s) 1: Rest, Medication (see MAR)  Post Session: 4/10   Medications Last Reviewed:  2020  Updated Objective Findings:  None Today  TREATMENT:     THERAPEUTIC EXERCISE: (30 minutes):  Exercises per grid below to improve mobility, strength, balance and coordination. Required minimal visual, verbal and manual cues to promote proper body alignment, promote proper body posture and promote proper body mechanics. Progressed resistance, range, repetitions and complexity of movement as indicated.    Date:  2020   Activity/Exercise Parameters   Nu-step 8 minutes  Level 5   Step ups 6 inch  15 reps  L LE leading  No UE support   Step downs 4 inch  15 reps  L LE on step  1 UE support   Tiltboard: right/left 20 reps  No UE support   Tiltboard: forward/backward 20 reps  No UE support   Calf stretch on slant board 10 reps  3 second hold  B FRIEDA   Leg press 130 pounds  B LE  20 reps   Leg curls 50 pounds  B LE  20 reps   Leg extension 50 pounds  B LE  20 reps      MANUAL THERAPY: (10 minutes): Joint mobilization and Soft tissue mobilization was utilized and necessary because of the patient's restricted joint motion, loss of articular motion and restricted motion of soft tissue. Treatment/Session Summary:    · Response to Treatment:  Patient completed all activities with improving overall mobility. · Communication/Consultation:  None today  · Equipment provided today:  None today  · Recommendations/Intent for next treatment session: Next visit will focus on improving overall mobility and pain with daily tasks.     Total Treatment Billable Duration:  40 minutes  PT Patient Time In/Time Out  Time In: 1300  Time Out: [address] [facility], PT    Future Appointments   Date Time Provider Mandi Brown   5/19/2020  1:00 PM Claudia Pimentel, PT [facility] MATIAS   5/26/2020  1:00 PM Claudia Pimentel, PT [facility]

## 2020-05-26 ENCOUNTER — HOSPITAL ENCOUNTER (OUTPATIENT)
Dept: PHYSICAL THERAPY | Age: 70
Discharge: HOME | End: 2020-05-26
Payer: MEDICARE

## 2020-05-26 PROCEDURE — 97110 THERAPEUTIC EXERCISES: CPT

## 2020-05-26 PROCEDURE — 97140 MANUAL THERAPY 1/> REGIONS: CPT

## 2020-05-26 NOTE — PROGRESS NOTES
Nicolas Nassar  : 1950  [place]  Secondary: Dashawn Roy at [place] [id number] [facility], [address] [place] [address],[address], [facility] [address]  [phone]   [phone]        OUTPATIENT PHYSICAL THERAPY: Daily Treatment Note 2020  Visit Count:  15    ICD-10: Treatment Diagnosis:   · Pain in left knee (M25.562)  · Stiffness of left knee, not elsewhere classified (M25.662)  · Pain in right knee (M25.561)  · Stiffness of right knee, not elsewhere classified (M25.661)  Precautions/Allergies:   Latex; Adhesive; Lortab [hydrocodone-acetaminophen]; and Oxycontin [oxycodone]   TREATMENT PLAN:  Effective Dates: 2020 to 8/10/2020 (90 days). Frequency/Duration: 2 times a week for 90 Day(s)    Pre-treatment Symptoms/Complaints:  2020: Patient reports he is still hurting along the joint line of his knee. Pain: Initial: Pain Intensity 1: 4  Pain Location 1: Knee  Pain Orientation 1: Right, Left  Pain Intervention(s) 1: Rest, Medication (see MAR)  Post Session: 4/10   Medications Last Reviewed:  2020  Updated Objective Findings:  None Today  TREATMENT:     THERAPEUTIC EXERCISE: (30 minutes):  Exercises per grid below to improve mobility, strength, balance and coordination. Required minimal visual, verbal and manual cues to promote proper body alignment, promote proper body posture and promote proper body mechanics. Progressed resistance, range, repetitions and complexity of movement as indicated.    Date:  2020   Activity/Exercise Parameters   Nu-step 8 minutes  Level 5   Step ups 6 inch  15 reps  L LE leading  No UE support   Step downs 4 inch  15 reps  L LE on step  1 UE support   Tiltboard: right/left 20 reps  No UE support   Tiltboard: forward/backward 20 reps  No UE support   Calf stretch on slant board 10 reps  3 second hold  B FRIEDA   Leg press 130 pounds  B LE  20 reps   Leg curls 50 pounds  B LE  20 reps   Leg extension 50 josie LYNCH  20 reps      MANUAL THERAPY: (10 minutes): Joint mobilization and Soft tissue mobilization was utilized and necessary because of the patient's restricted joint motion, loss of articular motion and restricted motion of soft tissue. Treatment/Session Summary:    · Response to Treatment:  Patient completed all activities with improving overall mobility. · Communication/Consultation:  None today  · Equipment provided today:  None today  · Recommendations/Intent for next treatment session: Next visit will focus on improving overall mobility and pain with daily tasks.     Total Treatment Billable Duration:  40 minutes  PT Patient Time In/Time Out  Time In: 1300  Time Out: 75 Rohit Davidson PT    Future Appointments   Date Time Provider Mandi Brown   5/26/2020  1:00 PM Irish Pederson, PT [facility]

## 2020-06-09 ENCOUNTER — HOSPITAL ENCOUNTER (OUTPATIENT)
Dept: PHYSICAL THERAPY | Age: 70
Discharge: HOME | End: 2020-06-09
Payer: MEDICARE

## 2020-06-09 PROCEDURE — 97110 THERAPEUTIC EXERCISES: CPT

## 2020-06-09 PROCEDURE — 97140 MANUAL THERAPY 1/> REGIONS: CPT

## 2020-06-09 NOTE — PROGRESS NOTES
Kirk Jacobs  : 1950  [place]  Secondary: Dashawn Roy at [address], [address] [place] [address],[address], [place] [address].  [phone]   [phone]        OUTPATIENT PHYSICAL THERAPY: Daily Treatment Note 2020  Visit Count:  16    ICD-10: Treatment Diagnosis:   · Pain in left knee (M25.562)  · Stiffness of left knee, not elsewhere classified (M25.662)  · Pain in right knee (M25.561)  · Stiffness of right knee, not elsewhere classified (M25.661)  Precautions/Allergies:   Latex; Adhesive; Lortab [hydrocodone-acetaminophen]; and Oxycontin [oxycodone]   TREATMENT PLAN:  Effective Dates: 2020 to 8/10/2020 (90 days). Frequency/Duration: 2 times a week for 90 Day(s)    Pre-treatment Symptoms/Complaints:  2020: Patient reports his knee is the same. Pain: Initial: Pain Intensity 1: 4  Pain Location 1: Knee  Pain Orientation 1: Right, Left  Pain Intervention(s) 1: Rest, Medication (see MAR)  Post Session: 4/10   Medications Last Reviewed:  2020  Updated Objective Findings:  None Today  TREATMENT:     THERAPEUTIC EXERCISE: (30 minutes):  Exercises per grid below to improve mobility, strength, balance and coordination. Required minimal visual, verbal and manual cues to promote proper body alignment, promote proper body posture and promote proper body mechanics. Progressed resistance, range, repetitions and complexity of movement as indicated.    Date:  2020   Activity/Exercise Parameters   Nu-step 8 minutes  Level 5   Step ups 6 inch  15 reps  L LE leading  No UE support   Step downs 4 inch  15 reps  L LE on step  1 UE support   Tiltboard: right/left 20 reps  No UE support   Tiltboard: forward/backward 20 reps  No UE support   Calf stretch on slant board 10 reps  3 second hold  B FRIEDA   Leg press 130 pounds  B LE  20 reps   Leg curls 50 pounds  B LE  20 reps   Leg extension 50 pounds  B LE  20 reps      MANUAL THERAPY: (10 minutes): Joint mobilization and Soft tissue mobilization was utilized and necessary because of the patient's restricted joint motion, loss of articular motion and restricted motion of soft tissue. Treatment/Session Summary:    · Response to Treatment:  Patient completed all activities with improving overall mobility. Minimal changes in pain and mobility. · Communication/Consultation:  None today  · Equipment provided today:  None today  · Recommendations/Intent for next treatment session: Next visit will focus on improving overall mobility and pain with daily tasks.     Total Treatment Billable Duration:  40 minutes  PT Patient Time In/Time Out  Time In: 1300  Time Out: 75 Rohit Davidson PT    Future Appointments   Date Time Provider Mandi Brown   6/23/2020 11:00 AM Louis Kirby PT [facility] MATIAS

## 2020-06-23 ENCOUNTER — HOSPITAL ENCOUNTER (OUTPATIENT)
Dept: PHYSICAL THERAPY | Age: 70
Discharge: HOME | End: 2020-06-23
Payer: MEDICARE

## 2020-06-23 PROCEDURE — 97110 THERAPEUTIC EXERCISES: CPT

## 2020-06-23 PROCEDURE — 97140 MANUAL THERAPY 1/> REGIONS: CPT

## 2020-06-23 NOTE — THERAPY DISCHARGE
Chay Tiwari  : 1950  [place]  Secondary: Dashawn Roy at [address], [address] [place] [address],[address], [address] [facility]  [phone]   [phone]           OUTPATIENT PHYSICAL THERAPY:Discharge Summary 2020   ICD-10: Treatment Diagnosis:   Pain in left knee (M25.562)  Stiffness of left knee, not elsewhere classified (M25.662)  Pain in right knee (M25.561)  Stiffness of right knee, not elsewhere classified (M25.661)  Precautions/Allergies:   Latex; Adhesive; Lortab [hydrocodone-acetaminophen]; and Oxycontin [oxycodone]   TREATMENT PLAN:  Effective Dates: 2020 to 8/10/2020 (90 days). Frequency/Duration: 2 times a week for 90 Day(s) MEDICAL/REFERRING DIAGNOSIS:  knee   DATE OF ONSET: Chronic  REFERRING PHYSICIAN: Stiven Walker MD MD Orders: Evaluate and Treat  Return MD Appointment: TBD     ASSESSMENT:  Mr. Benji Fletcher presents with improving mobility and pain in bilateral knees since initial evaluation. Patient has attended a total of 17 scheduled physical therapy visits including initial evaluation on 1/15/2020. Treatment has consisted of balance and strengthening activities to improve overall mobility, pain, and performance with activities of daily living. Patient will not attend any additional physical therapy visits secondary to discharge today. We will be happy to re-assess him with a change in his status and a new order from his doctor. Thank you for the opportunity to serve this patient. GOALS: (Goals have been discussed and agreed upon with patient.)  Short-Term Functional Goals: Time Frame: 30 days  1. Patient will be independent with home exercise program without exacerbation of symptoms or cueing needed--goal met. 2. Patient will be independent with correct sleeping positions and awareness/avoidance of aggravating positions without cueing needed--goal met.    Discharge Goals: Time Frame: 90 days  1. Patient will be independent with all ADLs with minimal onset of bilateral knee pain and no deficits with daily tasks--goal ongoing. 2. Patient will report no fear avoidance with social or recreational activities due to bilateral knee pain --goal ongoing. 3. Patient will score greater than or equal to 60/80 on Lower Extremity Functional Scale with minimal effect of bilateral knee pain on patient's ability to manage every day life activities--goal ongoing. OUTCOME MEASURE:   Tool Used: Lower Extremity Functional Scale (LEFS)  Score:  Initial: 33/80 Most Recent: 40/80 (Date: 6/23/2020 )   Interpretation of Score: 20 questions each scored on a 5 point scale with 0 representing \"extreme difficulty or unable to perform\" and 4 representing \"no difficulty\". The lower the score, the greater the functional disability. 80/80 represents no disability. Minimal detectable change is 9 points. EXAMINATION:   Observation/Orthostatic Postural Assessment:          Posture Assessment: Rounded shoulders, Forward head   Palpation:          No swelling or bruising noted. Mild tenderness to palpation along left knee joint line. ROM:         L LE Assessment (AROM):   · Hip Flexion: 80 degrees    · Hip Extension: 10 degrees    · Hip Abduction: 30 degrees    · Hip Adduction: 0 degrees    · Knee Flexion: 125 degrees    · Knee Extension: 0 degrees     Strength:         R LE Assessment (Strength):   · Hip Flexion: 4+/5 with manual muscle testing    · Hip Extension: 4+/5 with manual muscle testing    · Hip Abduction: 4+/5 with manual muscle testing    · Hip Adduction: 4+/5 with manual muscle testing    · Knee Flexion: 4+/5 with manual muscle testing    · Knee Extension: 4+/5 with manual muscle testing     Special Tests:          Negative  Neurological Screen:        Dermatomes:   Within normal limits        Reflexes:  2+  Functional Mobility:         Gait/Mobility:    Base of Support: Center of gravity altered  Speed/Enriqueta: Pace decreased (<100 feet/min)  Step Length: Left shortened  Swing Pattern: Left asymmetrical, Right symmetrical  Stance: Left decreased, Right increased  Gait Abnormalities: Antalgic  Ambulation - Level of Assistance: Independent  · Interventions: Verbal cues, Safety awareness training          Transfers:     Sit to Stand: Independent  Stand to Sit: Independent  Stand Pivot Transfers: Independent  Bed to Chair: Independent  Lateral Transfers: Independent         Bed Mobility:     Rolling: Independent  Supine to Sit: Independent  Sit to Supine: Independent  Scooting: Independent

## 2020-06-23 NOTE — PROGRESS NOTES
Naeem Feeling  : 1950  [place]  Secondary: Dashawn Roy at [facility]  [address], [address], [address]  [phone]   [phone]        OUTPATIENT PHYSICAL THERAPY: Daily Treatment Note 2020  Visit Count:  17    ICD-10: Treatment Diagnosis:   · Pain in left knee (M25.562)  · Stiffness of left knee, not elsewhere classified (M25.662)  · Pain in right knee (M25.561)  · Stiffness of right knee, not elsewhere classified (M25.661)  Precautions/Allergies:   Latex; Adhesive; Lortab [hydrocodone-acetaminophen]; and Oxycontin [oxycodone]   TREATMENT PLAN:  Effective Dates: 2020 to 8/10/2020 (90 days). Frequency/Duration: 2 times a week for 90 Day(s)    Pre-treatment Symptoms/Complaints:  2020: Patient reports he is about the same. Pain: Initial: Pain Intensity 1: 4  Pain Location 1: Knee  Pain Orientation 1: Right, Left  Pain Intervention(s) 1: Rest, Medication (see MAR)  Post Session: 4/10   Medications Last Reviewed:  2020  Updated Objective Findings:  None Today  TREATMENT:     THERAPEUTIC EXERCISE: (30 minutes):  Exercises per grid below to improve mobility, strength, balance and coordination. Required minimal visual, verbal and manual cues to promote proper body alignment, promote proper body posture and promote proper body mechanics. Progressed resistance, range, repetitions and complexity of movement as indicated.    Date:  2020   Activity/Exercise Parameters   Nu-step 8 minutes  Level 5   Step ups 6 inch  15 reps  L LE leading  No UE support   Step downs 4 inch  15 reps  L LE on step  1 UE support   Tiltboard: right/left 20 reps  No UE support   Tiltboard: forward/backward 20 reps  No UE support   Calf stretch on slant board 10 reps  3 second hold  B FRIEAD   Leg press 130 pounds  B LE  20 reps   Leg curls 50 pounds  B LE  20 reps   Leg extension 50 pounds  B LE  20 reps MANUAL THERAPY: (10 minutes): Joint mobilization and Soft tissue mobilization was utilized and necessary because of the patient's restricted joint motion, loss of articular motion and restricted motion of soft tissue. Treatment/Session Summary:    · Response to Treatment:  Patient completed all activities with improving overall mobility. Minimal changes in pain and mobility. · Communication/Consultation:  None today  · Equipment provided today:  None today  · Recommendations/Intent for next treatment session: Next visit will focus on improving overall mobility and pain with daily tasks.     Total Treatment Billable Duration:  40 minutes  PT Patient Time In/Time Out  Time In: 1100  Time Out: [facility], PT    Future Appointments   Date Time Provider Mandi Brown   6/23/2020 11:00 AM Mike Olmstead, PT [facility]

## 2020-10-06 ENCOUNTER — HOSPITAL ENCOUNTER (OUTPATIENT)
Dept: CT IMAGING | Age: 70
Discharge: HOME | End: 2020-10-06
Attending: OTOLARYNGOLOGY
Payer: MEDICARE

## 2020-10-06 DIAGNOSIS — J32.9 CHRONIC SINUSITIS, UNSPECIFIED LOCATION: ICD-10-CM

## 2020-10-06 PROCEDURE — 70486 CT MAXILLOFACIAL W/O DYE: CPT

## 2020-11-25 VITALS — BODY MASS INDEX: 31.81 KG/M2 | HEIGHT: 73 IN | WEIGHT: 240 LBS

## 2020-11-25 NOTE — PERIOP NOTES
Patient verified name and . Order for consent not found in EHR. Type 1B surgery, PAT phone assessment complete. Orders not received. Labs per surgeon: no orders received. Labs per anesthesia protocol: none. Patient answered medical/surgical history questions at their best of ability. All prior to admission medications documented in [facility]. Patient instructed to take the following medications the day of surgery according to anesthesia guidelines with a small sip of water: albuterol neb, pulmicort, coreg, performist, prilosec and yupelri neb. Hold all vitamins 7 days prior to surgery and NSAIDS 5 days prior to surgery. Patient instructed on the following:    > a negative Covid swab result is required to proceed with surgery;  appointments are made by the surgeon office and test should be collected 7 days prior to surgery. The testing center is located at the [address] [place] [address].   > 1 visitor allowed at this time. >Arrive at [facility], time of arrival to be called the day before by 1700  >NPO after midnight including gum, mints, and ice chips  >Responsible adult must drive patient to the hospital, stay during surgery, and patient will need supervision 24 hours after anesthesia  >Use antibacterial soap in shower the night before surgery and on the morning of surgery  >All piercings must be removed prior to arrival.    >Leave all valuables (money and jewelry) at home but bring insurance card and ID on  DOS. >Do not wear make-up, nail polish, lotions, cologne, perfumes, powders, or oil on skin.

## 2020-11-27 ENCOUNTER — HOSPITAL ENCOUNTER (OUTPATIENT)
Dept: SURGERY | Age: 70
Discharge: HOME | End: 2020-11-27

## 2020-12-01 DIAGNOSIS — J34.3 NASAL TURBINATE HYPERTROPHY: ICD-10-CM

## 2020-12-01 DIAGNOSIS — J32.2 CHRONIC ETHMOIDAL SINUSITIS: ICD-10-CM

## 2020-12-01 DIAGNOSIS — J32.0 CHRONIC MAXILLARY SINUSITIS: ICD-10-CM

## 2020-12-01 DIAGNOSIS — R09.81 NASAL CONGESTION: ICD-10-CM

## 2020-12-01 DIAGNOSIS — J32.1 CHRONIC FRONTAL SINUSITIS: ICD-10-CM

## 2020-12-01 DIAGNOSIS — H69.91 DISORDER OF RIGHT EUSTACHIAN TUBE: Primary | ICD-10-CM

## 2020-12-03 ENCOUNTER — ANESTHESIA EVENT (OUTPATIENT)
Dept: SURGERY | Age: 70
End: 2020-12-03
Payer: MEDICARE

## 2020-12-04 ENCOUNTER — HOSPITAL ENCOUNTER (OUTPATIENT)
Age: 70
Setting detail: OUTPATIENT SURGERY
Discharge: HOME | End: 2020-12-04
Attending: STUDENT IN AN ORGANIZED HEALTH CARE EDUCATION/TRAINING PROGRAM | Admitting: STUDENT IN AN ORGANIZED HEALTH CARE EDUCATION/TRAINING PROGRAM
Payer: MEDICARE

## 2020-12-04 ENCOUNTER — ANESTHESIA (OUTPATIENT)
Dept: SURGERY | Age: 70
End: 2020-12-04
Payer: MEDICARE

## 2020-12-04 VITALS
TEMPERATURE: 98 F | OXYGEN SATURATION: 95 % | DIASTOLIC BLOOD PRESSURE: 79 MMHG | RESPIRATION RATE: 16 BRPM | SYSTOLIC BLOOD PRESSURE: 155 MMHG | HEIGHT: 73 IN | HEART RATE: 54 BPM | BODY MASS INDEX: 32.87 KG/M2 | WEIGHT: 248 LBS

## 2020-12-04 DIAGNOSIS — H69.81 EUSTACHIAN TUBE DYSFUNCTION, RIGHT: ICD-10-CM

## 2020-12-04 DIAGNOSIS — J32.1 CHRONIC FRONTAL SINUSITIS: ICD-10-CM

## 2020-12-04 DIAGNOSIS — H69.91 DISORDER OF RIGHT EUSTACHIAN TUBE: ICD-10-CM

## 2020-12-04 DIAGNOSIS — J32.0 CHRONIC MAXILLARY SINUSITIS: ICD-10-CM

## 2020-12-04 DIAGNOSIS — J34.3 NASAL TURBINATE HYPERTROPHY: ICD-10-CM

## 2020-12-04 DIAGNOSIS — J32.2 CHRONIC ETHMOIDAL SINUSITIS: ICD-10-CM

## 2020-12-04 DIAGNOSIS — J34.2 DEVIATED NASAL SEPTUM: ICD-10-CM

## 2020-12-04 DIAGNOSIS — R09.81 NASAL CONGESTION: ICD-10-CM

## 2020-12-04 PROCEDURE — 2709999900 HC NON-CHARGEABLE SUPPLY: Performed by: STUDENT IN AN ORGANIZED HEALTH CARE EDUCATION/TRAINING PROGRAM

## 2020-12-04 PROCEDURE — 74011000250 HC RX REV CODE- 250: Performed by: ANESTHESIOLOGY

## 2020-12-04 PROCEDURE — 74011250636 HC RX REV CODE- 250/636: Performed by: NURSE ANESTHETIST, CERTIFIED REGISTERED

## 2020-12-04 PROCEDURE — 76010000133 HC OR TIME 3 TO 3.5 HR: Performed by: STUDENT IN AN ORGANIZED HEALTH CARE EDUCATION/TRAINING PROGRAM

## 2020-12-04 PROCEDURE — 77030028681 HC DRSG NSL ABSRB NASOPORE STRY -C: Performed by: STUDENT IN AN ORGANIZED HEALTH CARE EDUCATION/TRAINING PROGRAM

## 2020-12-04 PROCEDURE — 77030012840 HC ELECTRD COAG SUC CNMD -C: Performed by: STUDENT IN AN ORGANIZED HEALTH CARE EDUCATION/TRAINING PROGRAM

## 2020-12-04 PROCEDURE — 77030002888 HC SUT CHRMC J&J -A: Performed by: STUDENT IN AN ORGANIZED HEALTH CARE EDUCATION/TRAINING PROGRAM

## 2020-12-04 PROCEDURE — 76210000020 HC REC RM PH II FIRST 0.5 HR: Performed by: STUDENT IN AN ORGANIZED HEALTH CARE EDUCATION/TRAINING PROGRAM

## 2020-12-04 PROCEDURE — 88304 TISSUE EXAM BY PATHOLOGIST: CPT

## 2020-12-04 PROCEDURE — C1887 CATHETER, GUIDING: HCPCS | Performed by: STUDENT IN AN ORGANIZED HEALTH CARE EDUCATION/TRAINING PROGRAM

## 2020-12-04 PROCEDURE — 74011250637 HC RX REV CODE- 250/637: Performed by: STUDENT IN AN ORGANIZED HEALTH CARE EDUCATION/TRAINING PROGRAM

## 2020-12-04 PROCEDURE — 77030008669

## 2020-12-04 PROCEDURE — 31256 EXPLORATION MAXILLARY SINUS: CPT | Performed by: STUDENT IN AN ORGANIZED HEALTH CARE EDUCATION/TRAINING PROGRAM

## 2020-12-04 PROCEDURE — 77030039425 HC BLD LARYNG TRULITE DISP TELE -A: Performed by: ANESTHESIOLOGY

## 2020-12-04 PROCEDURE — 77030037088 HC TUBE ENDOTRACH ORAL NSL COVD-A: Performed by: ANESTHESIOLOGY

## 2020-12-04 PROCEDURE — 30140 RESECT INFERIOR TURBINATE: CPT | Performed by: STUDENT IN AN ORGANIZED HEALTH CARE EDUCATION/TRAINING PROGRAM

## 2020-12-04 PROCEDURE — 77030006907 HC BLD SNUS SHV MEDT -C: Performed by: STUDENT IN AN ORGANIZED HEALTH CARE EDUCATION/TRAINING PROGRAM

## 2020-12-04 PROCEDURE — 31296 NSL/SINS NDSC SURG FRNT SINS: CPT | Performed by: STUDENT IN AN ORGANIZED HEALTH CARE EDUCATION/TRAINING PROGRAM

## 2020-12-04 PROCEDURE — 74011250636 HC RX REV CODE- 250/636: Performed by: ANESTHESIOLOGY

## 2020-12-04 PROCEDURE — 30520 REPAIR OF NASAL SEPTUM: CPT | Performed by: STUDENT IN AN ORGANIZED HEALTH CARE EDUCATION/TRAINING PROGRAM

## 2020-12-04 PROCEDURE — 74011000250 HC RX REV CODE- 250: Performed by: STUDENT IN AN ORGANIZED HEALTH CARE EDUCATION/TRAINING PROGRAM

## 2020-12-04 PROCEDURE — 77030040361 HC SLV COMPR DVT MDII -B: Performed by: STUDENT IN AN ORGANIZED HEALTH CARE EDUCATION/TRAINING PROGRAM

## 2020-12-04 PROCEDURE — 74011250637 HC RX REV CODE- 250/637: Performed by: ANESTHESIOLOGY

## 2020-12-04 PROCEDURE — 76210000063 HC OR PH I REC FIRST 0.5 HR: Performed by: STUDENT IN AN ORGANIZED HEALTH CARE EDUCATION/TRAINING PROGRAM

## 2020-12-04 PROCEDURE — 77030006908 HC BLD SNUS SHV MEDT -D: Performed by: STUDENT IN AN ORGANIZED HEALTH CARE EDUCATION/TRAINING PROGRAM

## 2020-12-04 PROCEDURE — 76060000037 HC ANESTHESIA 3 TO 3.5 HR: Performed by: STUDENT IN AN ORGANIZED HEALTH CARE EDUCATION/TRAINING PROGRAM

## 2020-12-04 PROCEDURE — 74011000250 HC RX REV CODE- 250: Performed by: NURSE ANESTHETIST, CERTIFIED REGISTERED

## 2020-12-04 PROCEDURE — 77030036727 HC DEV INFL BLN SNUS SE DISP ACCL -B: Performed by: STUDENT IN AN ORGANIZED HEALTH CARE EDUCATION/TRAINING PROGRAM

## 2020-12-04 PROCEDURE — 31255 NSL/SINS NDSC W/TOT ETHMDCT: CPT | Performed by: STUDENT IN AN ORGANIZED HEALTH CARE EDUCATION/TRAINING PROGRAM

## 2020-12-04 PROCEDURE — 77030006671 HC BLD MYRIN BVR BD -A: Performed by: STUDENT IN AN ORGANIZED HEALTH CARE EDUCATION/TRAINING PROGRAM

## 2020-12-04 PROCEDURE — 77030003666 HC NDL SPINAL BD -A: Performed by: STUDENT IN AN ORGANIZED HEALTH CARE EDUCATION/TRAINING PROGRAM

## 2020-12-04 PROCEDURE — 69436 CREATE EARDRUM OPENING: CPT | Performed by: STUDENT IN AN ORGANIZED HEALTH CARE EDUCATION/TRAINING PROGRAM

## 2020-12-04 RX ORDER — ACETAMINOPHEN 500 MG
1000 TABLET ORAL ONCE
Status: COMPLETED | OUTPATIENT
Start: 2020-12-04 | End: 2020-12-04

## 2020-12-04 RX ORDER — EPHEDRINE SULFATE/0.9% NACL/PF 50 MG/5 ML
SYRINGE (ML) INTRAVENOUS AS NEEDED
Status: DISCONTINUED | OUTPATIENT
Start: 2020-12-04 | End: 2020-12-04 | Stop reason: HOSPADM

## 2020-12-04 RX ORDER — SODIUM CHLORIDE, SODIUM LACTATE, POTASSIUM CHLORIDE, CALCIUM CHLORIDE 600; 310; 30; 20 MG/100ML; MG/100ML; MG/100ML; MG/100ML
150 INJECTION, SOLUTION INTRAVENOUS CONTINUOUS
Status: DISCONTINUED | OUTPATIENT
Start: 2020-12-04 | End: 2020-12-04 | Stop reason: HOSPADM

## 2020-12-04 RX ORDER — GLYCOPYRROLATE 0.2 MG/ML
INJECTION INTRAMUSCULAR; INTRAVENOUS AS NEEDED
Status: DISCONTINUED | OUTPATIENT
Start: 2020-12-04 | End: 2020-12-04 | Stop reason: HOSPADM

## 2020-12-04 RX ORDER — ACETAMINOPHEN 500 MG
1000 TABLET ORAL
Status: DISCONTINUED | OUTPATIENT
Start: 2020-12-04 | End: 2020-12-04 | Stop reason: HOSPADM

## 2020-12-04 RX ORDER — FAMOTIDINE 20 MG/1
20 TABLET, FILM COATED ORAL ONCE
Status: DISCONTINUED | OUTPATIENT
Start: 2020-12-04 | End: 2020-12-04 | Stop reason: HOSPADM

## 2020-12-04 RX ORDER — MUPIROCIN 20 MG/G
OINTMENT TOPICAL AS NEEDED
Status: DISCONTINUED | OUTPATIENT
Start: 2020-12-04 | End: 2020-12-04 | Stop reason: HOSPADM

## 2020-12-04 RX ORDER — OXYMETAZOLINE HCL 0.05 %
SPRAY, NON-AEROSOL (ML) NASAL AS NEEDED
Status: DISCONTINUED | OUTPATIENT
Start: 2020-12-04 | End: 2020-12-04 | Stop reason: HOSPADM

## 2020-12-04 RX ORDER — ROCURONIUM BROMIDE 10 MG/ML
INJECTION, SOLUTION INTRAVENOUS AS NEEDED
Status: DISCONTINUED | OUTPATIENT
Start: 2020-12-04 | End: 2020-12-04 | Stop reason: HOSPADM

## 2020-12-04 RX ORDER — SODIUM CHLORIDE 0.9 % (FLUSH) 0.9 %
5-40 SYRINGE (ML) INJECTION AS NEEDED
Status: DISCONTINUED | OUTPATIENT
Start: 2020-12-04 | End: 2020-12-04 | Stop reason: HOSPADM

## 2020-12-04 RX ORDER — SODIUM CHLORIDE 9 MG/ML
50 INJECTION, SOLUTION INTRAVENOUS CONTINUOUS
Status: DISCONTINUED | OUTPATIENT
Start: 2020-12-04 | End: 2020-12-04 | Stop reason: HOSPADM

## 2020-12-04 RX ORDER — HYDROMORPHONE HYDROCHLORIDE 2 MG/ML
0.5 INJECTION, SOLUTION INTRAMUSCULAR; INTRAVENOUS; SUBCUTANEOUS
Status: DISCONTINUED | OUTPATIENT
Start: 2020-12-04 | End: 2020-12-04 | Stop reason: HOSPADM

## 2020-12-04 RX ORDER — CIPROFLOXACIN 0.5 MG/.25ML
SOLUTION/ DROPS AURICULAR (OTIC) AS NEEDED
Status: DISCONTINUED | OUTPATIENT
Start: 2020-12-04 | End: 2020-12-04 | Stop reason: HOSPADM

## 2020-12-04 RX ORDER — LIDOCAINE HYDROCHLORIDE 10 MG/ML
0.1 INJECTION INFILTRATION; PERINEURAL AS NEEDED
Status: DISCONTINUED | OUTPATIENT
Start: 2020-12-04 | End: 2020-12-04 | Stop reason: HOSPADM

## 2020-12-04 RX ORDER — LIDOCAINE HYDROCHLORIDE 20 MG/ML
INJECTION, SOLUTION EPIDURAL; INFILTRATION; INTRACAUDAL; PERINEURAL AS NEEDED
Status: DISCONTINUED | OUTPATIENT
Start: 2020-12-04 | End: 2020-12-04 | Stop reason: HOSPADM

## 2020-12-04 RX ORDER — PROPOFOL 10 MG/ML
INJECTION, EMULSION INTRAVENOUS AS NEEDED
Status: DISCONTINUED | OUTPATIENT
Start: 2020-12-04 | End: 2020-12-04 | Stop reason: HOSPADM

## 2020-12-04 RX ORDER — MIDAZOLAM HYDROCHLORIDE 1 MG/ML
2 INJECTION, SOLUTION INTRAMUSCULAR; INTRAVENOUS
Status: COMPLETED | OUTPATIENT
Start: 2020-12-04 | End: 2020-12-04

## 2020-12-04 RX ORDER — FENTANYL CITRATE 50 UG/ML
100 INJECTION, SOLUTION INTRAMUSCULAR; INTRAVENOUS ONCE
Status: DISCONTINUED | OUTPATIENT
Start: 2020-12-04 | End: 2020-12-04 | Stop reason: HOSPADM

## 2020-12-04 RX ORDER — LIDOCAINE HYDROCHLORIDE AND EPINEPHRINE 10; 10 MG/ML; UG/ML
INJECTION, SOLUTION INFILTRATION; PERINEURAL AS NEEDED
Status: DISCONTINUED | OUTPATIENT
Start: 2020-12-04 | End: 2020-12-04 | Stop reason: HOSPADM

## 2020-12-04 RX ORDER — ONDANSETRON 2 MG/ML
INJECTION INTRAMUSCULAR; INTRAVENOUS AS NEEDED
Status: DISCONTINUED | OUTPATIENT
Start: 2020-12-04 | End: 2020-12-04 | Stop reason: HOSPADM

## 2020-12-04 RX ORDER — SODIUM CHLORIDE 0.9 % (FLUSH) 0.9 %
5-40 SYRINGE (ML) INJECTION EVERY 8 HOURS
Status: DISCONTINUED | OUTPATIENT
Start: 2020-12-04 | End: 2020-12-04 | Stop reason: HOSPADM

## 2020-12-04 RX ORDER — FENTANYL CITRATE 50 UG/ML
INJECTION, SOLUTION INTRAMUSCULAR; INTRAVENOUS AS NEEDED
Status: DISCONTINUED | OUTPATIENT
Start: 2020-12-04 | End: 2020-12-04 | Stop reason: HOSPADM

## 2020-12-04 RX ORDER — NEOSTIGMINE METHYLSULFATE 1 MG/ML
INJECTION, SOLUTION INTRAVENOUS AS NEEDED
Status: DISCONTINUED | OUTPATIENT
Start: 2020-12-04 | End: 2020-12-04 | Stop reason: HOSPADM

## 2020-12-04 RX ADMIN — FENTANYL CITRATE 100 MCG: 50 INJECTION INTRAMUSCULAR; INTRAVENOUS at 10:29

## 2020-12-04 RX ADMIN — SODIUM CHLORIDE, SODIUM LACTATE, POTASSIUM CHLORIDE, AND CALCIUM CHLORIDE 150 ML/HR: 600; 310; 30; 20 INJECTION, SOLUTION INTRAVENOUS at 08:05

## 2020-12-04 RX ADMIN — ACETAMINOPHEN 1000 MG: 500 TABLET, FILM COATED ORAL at 08:25

## 2020-12-04 RX ADMIN — Medication 10 MG: at 10:47

## 2020-12-04 RX ADMIN — Medication 3 MG: at 13:25

## 2020-12-04 RX ADMIN — GLYCOPYRROLATE 0.4 MG: 0.2 INJECTION, SOLUTION INTRAMUSCULAR; INTRAVENOUS at 13:25

## 2020-12-04 RX ADMIN — Medication 10 MG: at 11:57

## 2020-12-04 RX ADMIN — LIDOCAINE HYDROCHLORIDE 80 MG: 20 INJECTION, SOLUTION EPIDURAL; INFILTRATION; INTRACAUDAL; PERINEURAL at 10:29

## 2020-12-04 RX ADMIN — PHENYLEPHRINE HYDROCHLORIDE 100 MCG: 10 INJECTION INTRAVENOUS at 10:40

## 2020-12-04 RX ADMIN — FENTANYL CITRATE 50 MCG: 50 INJECTION INTRAMUSCULAR; INTRAVENOUS at 13:04

## 2020-12-04 RX ADMIN — ONDANSETRON 4 MG: 2 INJECTION INTRAMUSCULAR; INTRAVENOUS at 13:00

## 2020-12-04 RX ADMIN — GLYCOPYRROLATE 0.2 MG: 0.2 INJECTION, SOLUTION INTRAMUSCULAR; INTRAVENOUS at 10:40

## 2020-12-04 RX ADMIN — ROCURONIUM BROMIDE 35 MG: 10 INJECTION, SOLUTION INTRAVENOUS at 10:29

## 2020-12-04 RX ADMIN — PROPOFOL 200 MG: 10 INJECTION, EMULSION INTRAVENOUS at 10:29

## 2020-12-04 RX ADMIN — MIDAZOLAM 2 MG: 1 INJECTION INTRAMUSCULAR; INTRAVENOUS at 08:36

## 2020-12-04 RX ADMIN — FENTANYL CITRATE 50 MCG: 50 INJECTION INTRAMUSCULAR; INTRAVENOUS at 13:18

## 2020-12-04 RX ADMIN — SODIUM CHLORIDE, SODIUM LACTATE, POTASSIUM CHLORIDE, AND CALCIUM CHLORIDE: 600; 310; 30; 20 INJECTION, SOLUTION INTRAVENOUS at 10:23

## 2020-12-04 RX ADMIN — LIDOCAINE HYDROCHLORIDE 0.1 ML: 10 INJECTION, SOLUTION INFILTRATION; PERINEURAL at 08:06

## 2020-12-04 RX ADMIN — ROCURONIUM BROMIDE 7 MG: 10 INJECTION, SOLUTION INTRAVENOUS at 12:34

## 2020-12-04 NOTE — ANESTHESIA PREPROCEDURE EVALUATION
Anesthetic History   No history of anesthetic complications  PONV          Review of Systems / Medical History  Patient summary reviewed, nursing notes reviewed and pertinent labs reviewed    Pulmonary    COPD: severe    Sleep apnea: CPAP  Smoker      Comments: Lung cancer, s/p radiation   Neuro/Psych   Within defined limits           Cardiovascular    Hypertension: well controlled              Exercise tolerance: <4 METS  Comments: Nonischemic cardiomyopathy in the past, ( viral), last echo showed EF >50%, mild aortic valve calcification, mild TR   GI/Hepatic/Renal     GERD: well controlled           Endo/Other    Diabetes: well controlled, type 2    Obesity and arthritis     Other Findings            Physical Exam    Airway  Mallampati: II  TM Distance: 4 - 6 cm  Neck ROM: normal range of motion   Mouth opening: Normal     Cardiovascular    Rhythm: regular  Rate: normal         Dental    Dentition: Bridges and Caps/crowns     Pulmonary  Breath sounds clear to auscultation      :left    Prolonged expiration     Abdominal  GI exam deferred       Other Findings            Anesthetic Plan    ASA: 3  Anesthesia type: general          Induction: Intravenous  Anesthetic plan and risks discussed with: Patient and Spouse

## 2020-12-04 NOTE — ANESTHESIA POSTPROCEDURE EVALUATION
Procedure(s):  RIGHT MYRINGOTOMY WITH TUBE  BILATERAL SINUS SURGERY ENDOSCOPIC  SEPTOPLASTY  TURBINATE REDUCTION / BILATERAL MAXILLARY ANTROSTOMY  / BILATERAL TOTAL ETHMOIDECTOMY  LEFT FRONTAL BALLOON SINUPLASTY. general    Anesthesia Post Evaluation      Multimodal analgesia: multimodal analgesia used between 6 hours prior to anesthesia start to PACU discharge  Patient location during evaluation: bedside  Patient participation: complete - patient participated  Level of consciousness: awake and alert  Pain management: adequate  Airway patency: patent  Anesthetic complications: no  Cardiovascular status: hemodynamically stable  Respiratory status: spontaneous ventilation  Hydration status: euvolemic  Comments: Patient stable and may discharge at this time.   Post anesthesia nausea and vomiting:  none  Final Post Anesthesia Temperature Assessment:  Normothermia (36.0-37.5 degrees C)      INITIAL Post-op Vital signs:   Vitals Value Taken Time   /84 12/4/2020  1:40 PM   Temp 36.7 °C (98 °F) 12/4/2020  1:34 PM   Pulse 61 12/4/2020  1:40 PM   Resp 16 12/4/2020  1:40 PM   SpO2 97 % 12/4/2020  1:40 PM Yes

## 2020-12-04 NOTE — BRIEF OP NOTE
Brief Postoperative Note    Patient: Demarcus Chung  YOB: 1950  MRN: [id number]    Date of Procedure: 12/4/2020     Pre-Op Diagnosis: Disorder of right eustachian tube [H69.91]  Deviated nasal septum [J34.2]  Hypertrophy, nasal, turbinate [J34.3]  Chronic maxillary sinusitis [J32.0]  Chronic ethmoidal sinusitis [J32.2]  Chronic frontal sinusitis [J32.1]  Nasal congestion [R09.81]    Post-Op Diagnosis: Same as preoperative diagnosis. Procedure(s):  RIGHT MYRINGOTOMY WITH TUBE  BILATERAL SINUS SURGERY ENDOSCOPIC  SEPTOPLASTY  TURBINATE REDUCTION / BILATERAL MAXILLARY ANTROSTOMY  / BILATERAL TOTAL ETHMOIDECTOMY  LEFT FRONTAL BALLOON SINUPLASTY    Surgeon(s):  Dandre Box DO    Surgical Assistant: None    Anesthesia: General     Estimated Blood Loss (mL): less than 784     Complications: None    Specimens:   ID Type Source Tests Collected by Time Destination   1 : right sinus contents Preservative   Dandre Box,  12/4/2020 1206 Pathology   2 : left sinus contents Preservative   Dandre Box,  12/4/2020 1234 Pathology        Implants:   Implant Name Type Inv. Item Serial No.  Lot No. LRB No. Used Action   otological ventilation tube; paparella II   [id number] Right 1 Implanted       Drains:   [REMOVED] Orogastric Tube 12/04/20 (Removed)       Findings: Significant nasal septal deviation to the left with large left bony septal spur to the left middle turbinate. Bilateral inferior turbinate hypertrophy. Bilateral chronic mucosal edema and inflammation of the paranasal sinuses with osteitis to the bony maxillary outflow tracts and narrowing of the left frontal recess.     Electronically Signed by Lucila Albright DO on 12/4/2020 at 5:19 PM

## 2020-12-04 NOTE — DISCHARGE INSTRUCTIONS
After your Sinuplasty:  1. Call the office with questions: If after office hours, please call the office and speak with the doctor on call. 2. If needed, sleep and rest with your head elevated to decrease swelling and pressure. 3. For the first day or two after surgery, you will have mild bloody drainage. You can wear a gauze pad under your nose to catch those drippings. This dressing can be changed as needed. CALL THE OFFICE IF YOU HAVE TO CHANGE THE GAUZE MORE THAN 3 TIMES IN 30 MINUTES. 4. Numbness to the front teeth is normal after sinus surgery. The feeling should return within 8 weeks, and will completely go away over time. 5. VERY IMPORTANT. Nasal irrigation with normal saline rinse is important after surgery. This is begun the day of surgery. You cannot do this too much. As a minimum of 6 times a day. 6. You may have splints in your nose; these need to be kept clean. Irrigating the nose helps keep them clean and open. 7. Nasal packing is generally NOT used. 8. You will irrigate blood clots from your nose for up to 2 weeks after surgery. IRRIGATION IS VERY IMPORTANT. 9. Do NOT blow your nose until you have had your first post-operative visit, and gotten clearance from your physician. 10. If you have to sneeze, sneeze with your mouth OPEN. 11. For mild pain, and/or low grade fever, you may take Tylenol. No Aspirin or Ibuprofen for at least ONE week after surgery. 12. Nasal Congestion after surgery is normal, and will resolve once your splints are removed. This is not immediate; you will still be swollen and healing.       After general anesthesia or intravenous sedation, for 24 hours or while taking prescription Narcotics:  · Limit your activities  · A responsible adult needs to be with you for the next 24 hours  · Do not drive and operate hazardous machinery  · Do not make important personal or business decisions  · Do  not drink alcoholic beverages  · If you have not urinated within 8 hours after discharge, please contact your surgeon on call. · If you have sleep apnea and have a CPAP machine, please use it for all naps and sleeping. · Please use caution when taking narcotics and any of your home medications that may cause drowsiness. *  Please give a list of your current medications to your Primary Care Provider. *  Please update this list whenever your medications are discontinued, doses are      changed, or new medications (including over-the-counter products) are added. *  Please carry medication information at all times in case of emergency situations. These are general instructions for a healthy lifestyle:  No smoking/ No tobacco products/ Avoid exposure to second hand smoke  Surgeon General's Warning:  Quitting smoking now greatly reduces serious risk to your health. Obesity, smoking, and sedentary lifestyle greatly increases your risk for illness  A healthy diet, regular physical exercise & weight monitoring are important for maintaining a healthy lifestyle    You may be retaining fluid if you have a history of heart failure or if you experience any of the following symptoms:  Weight gain of 3 pounds or more overnight or 5 pounds in a week, increased swelling in our hands or feet or shortness of breath while lying flat in bed. Please call your doctor as soon as you notice any of these symptoms; do not wait until your next office visit.

## 2020-12-07 NOTE — OP NOTES
[address] [address]  OPERATIVE REPORT    Name:  Adair Gardner  MR#:  [id number]  :  1950  ACCOUNT #:  [de-identified]  DATE OF SERVICE:  2020    PREOPERATIVE DIAGNOSES:  Right eustachian tube dysfunction, deviated nasal septum with nasal airway obstruction, nasal turbinate hypertrophy, chronic maxillary sinusitis, chronic ethmoid sinusitis, chronic frontal sinusitis. POSTOPERATIVE DIAGNOSES:  Right eustachian tube dysfunction, deviated nasal septum with nasal airway obstruction, nasal turbinate hypertrophy, chronic maxillary sinusitis, chronic ethmoid sinusitis, chronic frontal sinusitis. PROCEDURE PERFORMED:  Right myringotomy with placement of pressure equalizing tube, bilateral endoscopic sinus surgery, bilateral maxillary antrostomy, bilateral total ethmoidectomy, left frontal balloon sinuplasty, submucous resection of inferior turbinates, nasal septoplasty. SURGEON:  Nitza Bermudez DO    ASSISTANT SURGEON:  None. ANESTHESIA:  General anesthesia with endotracheal tube, 8 mL of 1% lidocaine with 1:100,000 epinephrine injection. COMPLICATIONS:  None. SPECIMENS REMOVED:  Sinus contents. IMPLANTS:  none. ESTIMATED BLOOD LOSS:  Less than 100 mL. DISPOSITION:  Stable to PACU. INDICATIONS FOR PROCEDURE:  This is a 80-year-old male who has a history of long-term nasal obstruction and nasal sinus congestion, found in the office to have a severe left-sided nasal septal deviation with large bony septal spur that was extending towards his left middle turbinate. Subsequently, he has had ongoing paranasal sinus disease and evidence of chronic sinusitis on CT imaging after maximum medical therapy. Therefore, all risks, benefits and alternatives to surgical correction of his nasal obstruction and endoscopic sinus surgery were reviewed and informed consent was obtained and signed, and he was scheduled for the operating room.     DESCRIPTION OF PROCEDURE:  On the date of procedure, the patient was brought from the preoperative waiting area to the operating room. He was laid supine on the operating table by the Anesthesia team.  General anesthesia was induced. Endotracheal tube was placed. Time-out was then performed. He was prepped and draped in the usual sterile fashion. An operating microscope was brought into the surgical field of view with binocular microscopy and the 250x power magnification lens and his head was rotated 45 degrees to the left and the microscope was focused onto the right ear canal.  Ear speculum was placed and focused onto the right tympanic membrane. There was minimal  cerumen and removed. The right TM was identified and a myringotomy knife was used to make a radial incision into the anterior inferior quadrant. A #3 [place] tip suction was used to evacuate a scant amount of serous effusion and a Paparella type 2 tube was placed into the incision site using an alligator forceps and a curved pick. Once proper placement was confirmed, antibiotic-coated drops were placed and a cotton ball was placed. Next, we had him placed back to the supine position and the nasal endoscopy equipment was brought into the surgical field. Afrin-soaked nasal pledgets were placed into the bilateral nasal cavities. Next, an instillation of a total of 8 mL of 1% lidocaine with 1:100,000 epinephrine was injected into the bilateral nasal septal mucosa into the anterior face of the bilateral inferior turbinate and into the bilateral middle turbinate root and uncinate processes. After a sufficient amount of time for hemostasis to occur, a 15 blade was then used to make a hemitransfixion incision onto the left nasal septum. This incision was taken down to the level of the submucoperichondrium and this plane was raised posteriorly in a wide fashion using a Amelia elevator and a Somis.   Next, a 15 blade was used to make the anterior quadrangular cartilage incision ensuring that there was at least 1 cm of an anterior caudal strut and very similarly after this incision, a [place] elevator was used to raise the right-sided submucoperichondrial plane posteriorly. Next, a long operating nasal speculum was placed on either side of the quadrangular cartilage and a curved scissors was used to make a superior straight cut onto the quadrangular cartilage on the deviated portions and a superior strut was left with a minimum of 1 cm and a swivel knife was used to remove a large portion of deviated quadrangular cartilage. Next, the remainder of deviated cartilage at the floor of the maxillary crest was elevated with a [place] elevator and removed with Hao forceps and subsequently, this revealed a very large bony spur from the vomer into the left nasal cavity. Meticulous endoscopic elevation was used with a suction [place] elevator to ensure that there were no mucosal tears and once the bony spur was freed circumferentially, Tanner-Martha forceps were used to remove the bone in a piecewise fashion and bony spur was removed with Glenna Gladwin forceps. Reevaluation with anterior rhinoscopy bilaterally revealed significant improvement to the nasal airway. Next, the nasal septal flaps were suctioned and the hemitransfixion incision was closed with simple interrupted 4-0 chromic gut sutures. Next, the mattress style through-and-through suture was used to close the nasal septal mucosa utilizing 4-0 plain gut running suture in a mattress style. Next, endoscopic approach was then used to examine the right nasal cavity, which had a relatively large paradoxical middle turbinate. The anterior paradoxical face of this turbinate was trimmed with a curved endoscopic scissors, which then revealed a large, floppy and edematous right uncinate mucosa and uncinate bone.   The right maxillary outflow tract was palpated and widened with an uncinate seeker probe and next, a backbiter was then used to perform the maxillary antrostomy on the right side and this was taken down to the level of infundibular and uncinate bone to the level just anterior to the right nasolacrimal convexity. The remainder of the uncinate bone was removed with a microdebrider and sufficient widening was performed circumferentially. Anatomical landmarks were identified to ensure there were no inadvertent injuries into the orbit. Next, the anterior most ethmoid bulla was palpated with a straight suction and then a microdebrider was used to open the anterior ethmoid cells. These were taken down in an inferomedial fashion moving anterograde and then eventually, dissection was made to remove bone through the basal lamella into the posterior ethmoid cells. Next, a retrograde approach was used to remove the a portion of the posterior ethmoid cells  to ensure there was no inadvertent injury into the skull base. Once the anterior and posterior ethmoid cells were widely opened and bony septations were removed, an Afrin-soaked pledget was placed into the right middle meatus and ethmoidectomy cavities. Next, endoscopic evaluation was performed to the left nasal cavity where very similarly, the left middle turbinate was medialized, which revealed the left middle meatus and uncinate bone. A Noemi probe was used to palpate the natural outflow tract ostium of the left maxillary sinus, which was then widely reflected anteriorly and the backbiter was used to make maxillary antrostomy into the left uncinate bone and this bone was taken down just anterior to the level of the nasolacrimal convexity. This antrostomy site was widened superiorly and inferiorly utilizing the microdebrider and anatomical landmarks were identified to ensure no inadvertent injury into the orbit.   Next, the straight suction was used to approach into the anterior ethmoid bulla in an inferomedial approach and the anterior ethmoid bony cells were removed with a microdebrider and widened, and then eventually through the basal lamella into the posterior ethmoid cells, staying low in an anterograde fashion and then in a retrograde fashion utilizing the curette and upbiting Blakesley forceps, retrograde removal was performed at the superior posterior ethmoid cells  to avoid any inadvertent injury into the skull base. Once the anterior and posterior ethmoid cells were opened widely and all bony septations were removed, reevaluation was performed and hemostasis was adequately achieved with Afrin-soaked nasal pledgets. Next, the frontal recess was identified utilizing a 30-degree nasal endoscope and an [place] paranasal sinus dilation balloon was utilized and placed into the frontal recess and the seeker light was confirmed to be into the left frontal recess, a left frontal sinus balloon dilation was performed for greater than 2 seconds at a high pressure and next 200 mL of sterile water saline were used to irrigate the left frontal sinus where there had been signs of chronic mucosal thickening at the level of this frontal recess. Next, the balloon was then removed and followup irrigation was performed in the bilateral ethmoid cavities and bilateral maxillary sinuses. At this time, Lurena Bennetts was trimmed into half and each half was placed to the bilateral middle meatus. Next, the 15 blade was used to make a single stab incision to the anterior face of the bilateral inferior turbinates and the small 1.5 mm microdebrider was then used to debride down and remove strips of bone from the bilateral inferior turbinates. Next, an elevator was used to outfracture the turbinates and crush them laterally.   Next, Torres splints coated in Bactroban ointment were then placed into the bilateral nasal cavities along the nasal septum and sutured anteriorly with a 2-0 Prolene suture and at this time, the care of the patient was transferred back over to Anesthesia where the patient awoke from anesthesia without complications and was extubated successfully. He was then transferred to PACU in stable condition with no  immediate postoperative complications.       [address] [place] [address]       [id number]  D:  12/06/2020 22:27  T:  12/07/2020 9:22  JOB #:  [id number]

## 2021-12-29 NOTE — PROGRESS NOTES
Problem: Self Care Deficits Care Plan (Adult)  Goal: *Acute Goals and Plan of Care (Insert Text)  Description  GOALS:   DISCHARGE GOALS (in preparation for going home/rehab):  3 days  1. Mr. Eduardo Rodriguez will perform one lower body dressing activity with minimal assistance required to demonstrate improved functional mobility and safety. 2.  Mr. Eduardo Rodriguez will perform one lower body bathing activity with minimal assistance required to demonstrate improved functional mobility and safety. 3.  Mr. Eduardo Rodriguez will perform toileting/toilet transfer with contact guard assistance to demonstrate improved functional mobility and safety. 4.  Mr. Eduardo Rodriguez will perform shower transfer with contact guard assistance to demonstrate improved functional mobility and safety. JOINT CAMP OCCUPATIONAL THERAPY TKA: Initial Assessment and Daily Note 12/17/2019  INPATIENT: Hospital Day: 1  [place] / [place] / Product Type: Medicare /      NAME/AGE/GENDER: Marston Hatchet is a 77 y.o. male   PRIMARY DIAGNOSIS:  Unspecified complication of internal orthopedic prosthetic device, implant and graft, initial encounter ([facility] [place].[email or web address]. [id number]]   Procedure(s) and Anesthesia Type:     * LEFT TOTAL KNEE ARTHROPLASTY REVISION DEPUY - Spinal (Left)  ICD-10: Treatment Diagnosis:    Pain in Left Knee (M25.562)  Stiffness of Left Knee, Not elsewhere classified (V81.759)      ASSESSMENT:     Mr. Eduardo Rodriguez is s/p Left TKA and presents with decreased weight bearing on L LE and decreased independence with functional mobility and activities of daily living as compared to baseline level of function and safety. Patient would benefit from skilled Occupational Therapy to maximize independence and safety with self-care task and functional mobility. Pt would also benefit from education on adaptive equipment and safety precautions in preparation for going home tomorrow. Mobilized from bed to recliner using a rolling walker.  Should There are no signs of distress or discomfort  Resp even and unlabored  Pt is being monitored Q7 minutes  This nurse to continue monitoring        Maci Allen RN  12/28/21 7739 progress well with ADL's tomorrow. This section established at most recent assessment   PROBLEM LIST (Impairments causing functional limitations):  Decreased Strength  Decreased ADL/Functional Activities  Decreased Transfer Abilities  Increased Pain  Increased Fatigue  Decreased Flexibility/Joint Mobility  Decreased Knowledge of Precautions   INTERVENTIONS PLANNED: (Benefits and precautions of occupational therapy have been discussed with the patient.)  Activities of daily living training  Adaptive equipment training  Balance training  Clothing management  Donning&doffing training  Theraputic activity     TREATMENT PLAN: Frequency/Duration: Follow patient 1-2tx to address above goals. Rehabilitation Potential For Stated Goals: Excellent     RECOMMENDED REHABILITATION/EQUIPMENT: (at time of discharge pending progress): Continue Skilled Therapy. OCCUPATIONAL PROFILE AND HISTORY:   History of Present Injury/Illness (Reason for Referral): Pt presents this date s/p (Left) TKA. Past Medical History/Comorbidities:   Mr. Breann He  has a past medical history of Arthritis, BMI 33.0-33.9,adult, CAD (coronary artery disease), Chronic pain, COPD, Diabetes ([address] Dyslipidemia, Former smoker, Heart failure ([address] History of echocardiogram (12/14/2018), Hypertension, Lung cancer ([address] N&V (nausea and vomiting) (04/23/2015), and Unspecified sleep apnea.  He also has no past medical history of Adverse effect of anesthesia, Aneurysm ([address] Arrhythmia, Asthma, Autoimmune disease ([address] Cancer ([address] Chronic kidney disease, Coagulation defects, Coagulation disorder ([address] Difficult intubation, Endocarditis, GERD (gastroesophageal reflux disease), Ill-defined condition, Liver disease, Malignant hyperthermia due to anesthesia, Morbid obesity ([address] Nicotine vapor product user, Non-nicotine vapor product user, Pseudocholinesterase deficiency, Psychiatric disorder, PUD (peptic ulcer disease), Rheumatic fever, Seizures ([facility] Stroke ([facility] Thromboembolus ([facility] Thyroid disease, or Unspecified adverse effect of anesthesia. Mr. Sunitha Callejas  has a past surgical history that includes hx orthopaedic (Left, 1975); hx shoulder arthroscopy (Left, 2004); hx shoulder arthroscopy (Right, 2010); hx knee arthroscopy (Left, 2014); pr cardiac surg procedure unlist (2006); hx cataract removal (Left, 2015); hx knee replacement (Left, 04/21/2015); hx colonoscopy (2017); and hx retinal detachment repair (Left). Social History/Living Environment:   Home Environment: Private residence  # Steps to Enter: 4  Hand Rails : Bilateral  One/Two Story Residence: One story  Living Alone: No  Support Systems: Spouse/Significant Other/Partner  Patient Expects to be Discharged to[de-identified] Private residence  Current DME Used/Available at Home: Florette Party, straight, Commode, bedside, Crutches, Shower chair, Walker, rolling  Tub or Shower Type: Shower  Prior Level of Function/Work/Activity:  Independent prior. Number of Personal Factors/Comorbidities that affect the Plan of Care: Brief history (0):  LOW COMPLEXITY   ASSESSMENT OF OCCUPATIONAL PERFORMANCE[de-identified]   Most Recent Physical Functioning:   Balance  Sitting: Intact  Standing: With support       Gross Assessment  AROM: Within functional limits(R LE)  Strength: Within functional limits(R LE)  Coordination: Within functional limits          LLE Strength  L Hip Flexion: 2+  L Knee Flexion: 2+  L Knee Extension: 2+ Coordination  Fine Motor Skills-Upper: Left Intact; Right Intact  Gross Motor Skills-Upper: Left Intact; Right Intact         Mental Status  Neurologic State: Alert  Orientation Level: Oriented X4  Cognition: Appropriate decision making  Perception: Appears intact                Basic ADLs (From Assessment) Complex ADLs (From Assessment)   Basic ADL  Feeding: Independent  Oral Facial Hygiene/Grooming: Setup  Bathing: Minimum assistance  Upper Body Dressing: Setup  Lower Body Dressing:  Moderate assistance  Toileting: Minimum assistance     Grooming/Bathing/Dressing Activities of Daily Living                       Functional Transfers  Bathroom Mobility: Minimum assistance  Toilet Transfer : Minimum assistance  Shower Transfer: Moderate assistance     Bed/Mat Mobility  Supine to Sit: Contact guard assistance  Sit to Stand: Contact guard assistance  Stand to Sit: Contact guard assistance  Bed to Chair: Contact guard assistance  Scooting: Contact guard assistance         Physical Skills Involved:  Range of Motion  Balance  Strength Cognitive Skills Affected (resulting in the inability to perform in a timely and safe manner):  WFL  Psychosocial Skills Affected:  WFL    Number of elements that affect the Plan of Care: 1-3:  LOW COMPLEXITY   CLINICAL DECISION MAKIN[address] [place] [address] AM-PAC 6 Clicks   Daily Activity Inpatient Short Form  How much help from another person does the patient currently need. .. Total A Lot A Little None   1. Putting on and taking off regular lower body clothing? [] 1   [x] 2   [] 3   [] 4   2. Bathing (including washing, rinsing, drying)? [] 1   [x] 2   [] 3   [] 4   3. Toileting, which includes using toilet, bedpan or urinal?   [] 1   [x] 2   [] 3   [] 4   4. Putting on and taking off regular upper body clothing? [] 1   [] 2   [] 3   [x] 4   5. Taking care of personal grooming such as brushing teeth? [] 1   [] 2   [] 3   [x] 4   6. Eating meals? [] 1   [] 2   [] 3   [x] 4   © , Trustees of [address] [place] [address], under license to [employer]. All rights reserved     Score:  Initial: 18 Most Recent: X (Date: -- )    Interpretation of Tool:  Represents activities that are increasingly more difficult (i.e. Bed mobility, Transfers, Gait). Medical Necessity:     Skilled intervention continues to be required due to new TKA. Reason for Services/Other Comments:  Patient continues to require skilled intervention due to   Deficits lsited abvoe   .    Use of outcome tool(s) and clinical judgement create a POC that gives a: MODERATE COMPLEXITY            TREATMENT:   (In addition to Assessment/Re-Assessment sessions the following treatments were rendered)     Pre-treatment Symptoms/Complaints:    Pain: Initial:   Pain Intensity 1: 2  Post Session:  2     Self Care: (10): Procedure(s) (per grid) utilized to improve and/or restore self-care/home management as related to dressing and toileting. Required minimal verbal and tactile cueing to facilitate activities of daily living skills. Initial evaluation 5 minutes. Treatment/Session Assessment:     Response to Treatment:  Good, sitting up in recliner. Education:  [] Home Exercises  [x] Fall Precautions  [] Hip Precautions [] Going Home Video  [x] Knee/Hip Prosthesis Review  [x] Walker Management/Safety [x] Adaptive Equipment as Needed       Interdisciplinary Collaboration:   Physical Therapist  Occupational Therapist  Registered Nurse    After treatment position/precautions:   Up in chair  Bed/Chair-wheels locked  Caregiver at bedside  Call light within reach  RN notified     Compliance with Program/Exercises: Compliant all of the time, Will assess as treatment progresses. Recommendations/Intent for next treatment session:  Treatment next visit will focus on increasing Mr. Tiwaris independence with bed mobility, transfers, self care, functional mobility, modalities for pain, and patient education.       Total Treatment Duration:  OT Patient Time In/Time Out  Time In: 1530  Time Out: 8532  [place], [place]

## 2022-03-18 PROBLEM — T84.033A MECHANICAL LOOSENING OF INTERNAL LEFT KNEE PROSTHETIC JOINT (HCC): Status: ACTIVE | Noted: 2019-12-17

## 2022-03-18 PROBLEM — Z96.652 PRESENCE OF LEFT ARTIFICIAL KNEE JOINT: Status: ACTIVE | Noted: 2020-03-03

## 2022-03-19 PROBLEM — T84.9XXA: Status: ACTIVE | Noted: 2020-03-03

## 2022-03-20 PROBLEM — Z96.652 S/P REVISION OF TOTAL KNEE, LEFT: Status: ACTIVE | Noted: 2019-12-18

## 2022-11-29 ENCOUNTER — APPOINTMENT (RX ONLY)
Dept: URBAN - NONMETROPOLITAN AREA CLINIC 1 | Facility: CLINIC | Age: 72
Setting detail: DERMATOLOGY
End: 2022-11-29

## 2022-11-29 DIAGNOSIS — D485 NEOPLASM OF UNCERTAIN BEHAVIOR OF SKIN: ICD-10-CM

## 2022-11-29 DIAGNOSIS — L57.0 ACTINIC KERATOSIS: ICD-10-CM

## 2022-11-29 DIAGNOSIS — L82.1 OTHER SEBORRHEIC KERATOSIS: ICD-10-CM

## 2022-11-29 DIAGNOSIS — L57.8 OTHER SKIN CHANGES DUE TO CHRONIC EXPOSURE TO NONIONIZING RADIATION: ICD-10-CM | Status: INADEQUATELY CONTROLLED

## 2022-11-29 PROBLEM — D48.5 NEOPLASM OF UNCERTAIN BEHAVIOR OF SKIN: Status: ACTIVE | Noted: 2022-11-29

## 2022-11-29 PROCEDURE — 11300 SHAVE SKIN LESION 0.5 CM/<: CPT

## 2022-11-29 PROCEDURE — ? FULL BODY SKIN EXAM - DECLINED

## 2022-11-29 PROCEDURE — 17000 DESTRUCT PREMALG LESION: CPT | Mod: 59

## 2022-11-29 PROCEDURE — ? COUNSELING

## 2022-11-29 PROCEDURE — ? TREATMENT REGIMEN

## 2022-11-29 PROCEDURE — 99203 OFFICE O/P NEW LOW 30 MIN: CPT | Mod: 25

## 2022-11-29 PROCEDURE — ? SUNSCREEN RECOMMENDATIONS

## 2022-11-29 PROCEDURE — ? SHAVE REMOVAL

## 2022-11-29 PROCEDURE — 17003 DESTRUCT PREMALG LES 2-14: CPT

## 2022-11-29 PROCEDURE — ? LIQUID NITROGEN

## 2022-11-29 ASSESSMENT — LOCATION DETAILED DESCRIPTION DERM
LOCATION DETAILED: LEFT SUPERIOR MEDIAL MALAR CHEEK
LOCATION DETAILED: LEFT SUPERIOR PARIETAL SCALP
LOCATION DETAILED: RIGHT SUPERIOR PARIETAL SCALP
LOCATION DETAILED: RIGHT SUPERIOR OCCIPITAL SCALP
LOCATION DETAILED: RIGHT DISTAL POSTERIOR UPPER ARM
LOCATION DETAILED: LEFT VENTRAL PROXIMAL FOREARM
LOCATION DETAILED: RIGHT PROXIMAL POSTERIOR UPPER ARM
LOCATION DETAILED: LEFT SUPERIOR OCCIPITAL SCALP
LOCATION DETAILED: LEFT PROXIMAL POSTERIOR UPPER ARM
LOCATION DETAILED: LEFT SUPERIOR MEDIAL UPPER BACK

## 2022-11-29 ASSESSMENT — LOCATION SIMPLE DESCRIPTION DERM
LOCATION SIMPLE: RIGHT OCCIPITAL SCALP
LOCATION SIMPLE: RIGHT UPPER ARM
LOCATION SIMPLE: LEFT CHEEK
LOCATION SIMPLE: SCALP
LOCATION SIMPLE: LEFT FOREARM
LOCATION SIMPLE: LEFT UPPER BACK
LOCATION SIMPLE: LEFT OCCIPITAL SCALP
LOCATION SIMPLE: LEFT UPPER ARM

## 2022-11-29 ASSESSMENT — LOCATION ZONE DERM
LOCATION ZONE: SCALP
LOCATION ZONE: FACE
LOCATION ZONE: TRUNK
LOCATION ZONE: ARM

## 2022-11-29 NOTE — PROCEDURE: MIPS QUALITY
Quality 431: Preventive Care And Screening: Unhealthy Alcohol Use - Screening: Patient not identified as an unhealthy alcohol user when screened for unhealthy alcohol use using a systematic screening method
Quality 130: Documentation Of Current Medications In The Medical Record: Current Medications Documented
Quality 226: Preventive Care And Screening: Tobacco Use: Screening And Cessation Intervention: Patient screened for tobacco use, is a smoker AND did not received Cessation Counseling for Unknown Reasons within the Previous 12 Months
Detail Level: Detailed

## 2022-11-29 NOTE — PROCEDURE: SHAVE REMOVAL
Medical Necessity Information: It is in your best interest to select a reason for this procedure from the list below. All of these items fulfill various CMS LCD requirements except the new and changing color options.
Medical Necessity Clause: This procedure was medically necessary because the lesion that was treated was:
Lab: -97
Lab Facility: 0
Detail Level: Detailed
Was A Bandage Applied: Yes
Size Of Lesion In Cm (Required): 0.5
Biopsy Method: Dermablade
Anesthesia Type: 1% lidocaine with epinephrine and a 1:10 solution of 8.4% sodium bicarbonate
Hemostasis: Drysol
Wound Care: Petrolatum
Render Path Notes In Note?: No
Consent was obtained from the patient. The risks and benefits to therapy were discussed in detail. Specifically, the risks of infection, scarring, bleeding, prolonged wound healing, incomplete removal, allergy to anesthesia, nerve injury and recurrence were addressed. Prior to the procedure, the treatment site was clearly identified and confirmed by the patient. All components of Universal Protocol/PAUSE Rule completed.
Post-Care Instructions: I reviewed with the patient in detail post-care instructions. Patient is to keep the biopsy site dry overnight, and then apply bacitracin twice daily until healed. Patient may apply hydrogen peroxide soaks to remove any crusting.
Notification Instructions: Patient will be notified of pathology results. However, patient instructed to call the office if not contacted within 2 weeks.
Billing Type: Third-Party Bill

## 2023-06-26 ENCOUNTER — OFFICE VISIT (OUTPATIENT)
Dept: ENT CLINIC | Age: 73
End: 2023-06-26
Payer: MEDICARE

## 2023-06-26 VITALS
SYSTOLIC BLOOD PRESSURE: 136 MMHG | BODY MASS INDEX: 34.19 KG/M2 | HEIGHT: 73 IN | RESPIRATION RATE: 18 BRPM | DIASTOLIC BLOOD PRESSURE: 78 MMHG | WEIGHT: 258 LBS

## 2023-06-26 DIAGNOSIS — H69.81 ETD (EUSTACHIAN TUBE DYSFUNCTION), RIGHT: ICD-10-CM

## 2023-06-26 DIAGNOSIS — M26.629 TMJ SYNDROME: ICD-10-CM

## 2023-06-26 DIAGNOSIS — H92.01 OTALGIA, RIGHT: Primary | ICD-10-CM

## 2023-06-26 PROCEDURE — 3078F DIAST BP <80 MM HG: CPT | Performed by: STUDENT IN AN ORGANIZED HEALTH CARE EDUCATION/TRAINING PROGRAM

## 2023-06-26 PROCEDURE — 1123F ACP DISCUSS/DSCN MKR DOCD: CPT | Performed by: STUDENT IN AN ORGANIZED HEALTH CARE EDUCATION/TRAINING PROGRAM

## 2023-06-26 PROCEDURE — G8417 CALC BMI ABV UP PARAM F/U: HCPCS | Performed by: STUDENT IN AN ORGANIZED HEALTH CARE EDUCATION/TRAINING PROGRAM

## 2023-06-26 PROCEDURE — 3017F COLORECTAL CA SCREEN DOC REV: CPT | Performed by: STUDENT IN AN ORGANIZED HEALTH CARE EDUCATION/TRAINING PROGRAM

## 2023-06-26 PROCEDURE — 99213 OFFICE O/P EST LOW 20 MIN: CPT | Performed by: STUDENT IN AN ORGANIZED HEALTH CARE EDUCATION/TRAINING PROGRAM

## 2023-06-26 PROCEDURE — 1036F TOBACCO NON-USER: CPT | Performed by: STUDENT IN AN ORGANIZED HEALTH CARE EDUCATION/TRAINING PROGRAM

## 2023-06-26 PROCEDURE — 3075F SYST BP GE 130 - 139MM HG: CPT | Performed by: STUDENT IN AN ORGANIZED HEALTH CARE EDUCATION/TRAINING PROGRAM

## 2023-06-26 PROCEDURE — G8427 DOCREV CUR MEDS BY ELIG CLIN: HCPCS | Performed by: STUDENT IN AN ORGANIZED HEALTH CARE EDUCATION/TRAINING PROGRAM

## 2023-06-26 RX ORDER — AMLODIPINE BESYLATE 2.5 MG/1
2.5 TABLET ORAL DAILY
COMMUNITY
Start: 2022-01-20

## 2023-06-26 RX ORDER — CARVEDILOL 3.12 MG/1
3.12 TABLET ORAL 2 TIMES DAILY
Qty: 60 TABLET | Refills: 34 | COMMUNITY
Start: 2021-07-15 | End: 2024-05-02

## 2023-06-26 RX ORDER — BICALUTAMIDE 50 MG/1
TABLET, FILM COATED ORAL
COMMUNITY
Start: 2023-06-04

## 2023-06-26 RX ORDER — ALFUZOSIN HYDROCHLORIDE 10 MG/1
TABLET, EXTENDED RELEASE ORAL
COMMUNITY
Start: 2023-06-04

## 2023-06-26 ASSESSMENT — ENCOUNTER SYMPTOMS
SINUS PRESSURE: 0
EYE DISCHARGE: 0
STRIDOR: 0
COUGH: 0
DIARRHEA: 0
CHOKING: 0
SINUS PAIN: 0
SHORTNESS OF BREATH: 0
WHEEZING: 0
EYE ITCHING: 0
APNEA: 0
EYE PAIN: 0
FACIAL SWELLING: 0
NAUSEA: 0
CONSTIPATION: 0

## 2024-04-24 ENCOUNTER — OFFICE VISIT (OUTPATIENT)
Dept: ORTHOPEDIC SURGERY | Age: 74
End: 2024-04-24
Payer: MEDICARE

## 2024-04-24 DIAGNOSIS — Z96.652 TOTAL KNEE REPLACEMENT STATUS, LEFT: ICD-10-CM

## 2024-04-24 DIAGNOSIS — M25.562 ACUTE PAIN OF LEFT KNEE: Primary | ICD-10-CM

## 2024-04-24 DIAGNOSIS — Z09 SURGERY FOLLOW-UP: ICD-10-CM

## 2024-04-24 PROCEDURE — 1123F ACP DISCUSS/DSCN MKR DOCD: CPT | Performed by: ORTHOPAEDIC SURGERY

## 2024-04-24 PROCEDURE — G8428 CUR MEDS NOT DOCUMENT: HCPCS | Performed by: ORTHOPAEDIC SURGERY

## 2024-04-24 PROCEDURE — 1036F TOBACCO NON-USER: CPT | Performed by: ORTHOPAEDIC SURGERY

## 2024-04-24 PROCEDURE — 3017F COLORECTAL CA SCREEN DOC REV: CPT | Performed by: ORTHOPAEDIC SURGERY

## 2024-04-24 PROCEDURE — G8417 CALC BMI ABV UP PARAM F/U: HCPCS | Performed by: ORTHOPAEDIC SURGERY

## 2024-04-24 PROCEDURE — 99214 OFFICE O/P EST MOD 30 MIN: CPT | Performed by: ORTHOPAEDIC SURGERY

## 2024-04-24 NOTE — PROGRESS NOTES
Name: Gabo Simmons  YOB: 1950  Gender: male  MRN: [id number]    CC: Left knee pain with remote total knee arthroplasty revision    HPI: Gabo Simmons is a 73 y.o. male who presents with concerns regarding his left total knee arthroplasty.  He underwent initial left total knee arthroplasty in 2015 with a cemented DePuy attune implant.  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  Prior to his revision surgery his x-rays and workup was relatively negative but we proceeded with surgery because he continued to have weightbearing pain.  It was only the time of surgery we were able to confirm that he had debonding of his tibial component.  He was revised to a cemented revision rotating platform implant without sleep fixation.    He recovered from that reasonably well has not been seen in some time.    He comes today complaining of anterolateral discomfort of his left knee now.  He has not noticed any swelling or particular weightbearing pain.  He denies significant change in activity and continues to workout in the gym he states several days a week.  He has had no fever or malaise or traumatic episode that he can recall.    History was obtained from patient    ROS/Meds/PSH/PMH/FH/SH: I personally reviewed the patients standard intake form.  Below are the pertinents    Tobacco:  reports that he quit smoking about 6 years ago. His smoking use included cigarettes. He has never used smokeless tobacco.  Past Medical History:   Diagnosis Date    Arthritis     osteo    BMI 33.0-33.9,adult     CAD (coronary artery disease)     minimal nonobstructive CAD  cath 8/06 at [facility]; followed by [facility]     Chronic pain     left knee    Diabetes (HCC)     pre-diabetes; A1C-6.2 on 12/02/19    Dyslipidemia     controlled with med    Former smoker     GERD (gastroesophageal reflux disease)     managed with meds

## (undated) DEVICE — ELECTROSURGICAL SUCTION COAGULATOR 10FR

## (undated) DEVICE — SYSTEM CULT COLL OR TRNSPRT CLR DBL SWAB W/ MOD AERB AMIES

## (undated) DEVICE — YANKAUER,BULB TIP,W/O VENT,RIGID,STERILE: Brand: MEDLINE

## (undated) DEVICE — CARDINAL HEALTH FLEXIBLE LIGHT HANDLE COVER: Brand: CARDINAL HEALTH

## (undated) DEVICE — NEEDLE SPNL 22GA L3.5IN BLK HUB S STL REG WALL FIT STYL W/

## (undated) DEVICE — BLADE SAW W12.5XL73.5MM THK0.8MM CUT THK1MM RECIP FOR L BNE

## (undated) DEVICE — MCLASS OSCILLATING SAW BLADE 19 X 1.27 (0.050") X 90 MM: Brand: MCLASS

## (undated) DEVICE — ZIMMER® STERILE DISPOSABLE TOURNIQUET CUFF WITH PROTECTIVE SLEEVE, DUAL PORT, SINGLE BLADDER, 34 IN. (86 CM)

## (undated) DEVICE — Device

## (undated) DEVICE — GOWN,REINF,POLY,ECL,PP SLV,XL: Brand: MEDLINE

## (undated) DEVICE — SOLUTION IV 1000ML 0.9% SOD CHL

## (undated) DEVICE — BLADE 1882040 5PK INFERIOR TURB 2MM

## (undated) DEVICE — TRAY PREP DRY W/ PREM GLV 2 APPL 6 SPNG 2 UNDPD 1 OVERWRAP

## (undated) DEVICE — BLADE 1884004HR TRICUT 5PK M4 4MM ROTATE: Brand: TRICUT

## (undated) DEVICE — SYR 50ML LR LCK 1ML GRAD NSAF --

## (undated) DEVICE — MARKER UTIL W/RULER AND LBL -- STRL

## (undated) DEVICE — SUT VCRL + 2-0 27IN CP1 VIO --

## (undated) DEVICE — DRAPE SHT 3 QTR PROXIMA 53X77 --

## (undated) DEVICE — REM POLYHESIVE ADULT PATIENT RETURN ELECTRODE: Brand: VALLEYLAB

## (undated) DEVICE — SOLUTION IRRIG 3000ML 0.9% SOD CHL FLX CONT 0797208] ICU MEDICAL INC]

## (undated) DEVICE — BLADE 1884080EM TRICUT 4MMX13CM M4 ROHS: Brand: FUSION®

## (undated) DEVICE — SUTURE STRATAFIX SPRL SZ 1 L14IN ABSRB VLT L48CM CTX 1/2 SXPD2B405

## (undated) DEVICE — BUTTON SWITCH PENCIL BLADE ELECTRODE, HOLSTER: Brand: EDGE

## (undated) DEVICE — 2000CC GUARDIAN II: Brand: GUARDIAN

## (undated) DEVICE — PACK PROCEDURE SURG TOT KNEE

## (undated) DEVICE — Device: Brand: POWER-FLO®

## (undated) DEVICE — KIT PROCEDURE SURG HEAD AND NECK TOTE

## (undated) DEVICE — (D)PREP SKN CHLRAPRP APPL 26ML -- CONVERT TO ITEM 371833

## (undated) DEVICE — CODMAN® SURGICAL PATTIES 1" X 3" (2.54CM X 7.62CM): Brand: CODMAN®

## (undated) DEVICE — HANDPIECE SET WITH COAXIAL HIGH FLOW TIP AND SUCTION TUBE: Brand: INTERPULSE

## (undated) DEVICE — Z CONVERTED USE 2421973 CONTAINER SPEC 60/30ML 10% FRMLN POLYPR PREFIL

## (undated) DEVICE — SUT CHRMC 4-0 27IN RB1 BRN --

## (undated) DEVICE — DEVICE INFL PRSS G INDIC DISP (MUST BE PURC IN MULTIPLES OF 5)

## (undated) DEVICE — BLADE BEAV SPEAR TIP 45 DEG --

## (undated) DEVICE — TOTAL 1-LAYER TRAY, LATEX FOLEY, 16FR 10: Brand: MEDLINE

## (undated) DEVICE — SUT VCRL + 1 27IN CP VIO --

## (undated) DEVICE — DRAPE,TOP,102X53,STERILE: Brand: MEDLINE

## (undated) DEVICE — KIT,ANTI FOG,W/SPONGE & FLUID,SOFT PACK: Brand: MEDLINE

## (undated) DEVICE — 3000CC GUARDIAN II: Brand: GUARDIAN

## (undated) DEVICE — TUBING, SUCTION, 1/4" X 10', STRAIGHT: Brand: MEDLINE

## (undated) DEVICE — SYR LR LCK 1ML GRAD NSAF 30ML --

## (undated) DEVICE — SOLUTION IV 500ML 0.9% SOD CHL FLX CONT

## (undated) DEVICE — FIRM 4CM: Brand: NASOPORE

## (undated) DEVICE — T4 HOOD

## (undated) DEVICE — BIPOLAR SEALER 23-112-1 AQM 6.0: Brand: AQUAMANTYS ®

## (undated) DEVICE — GARMENT,MEDLINE,DVT,INT,CALF,MED, GEN2: Brand: MEDLINE

## (undated) DEVICE — BALLOON SINUPLASTY 6X16 MM SYS RELIEVA SPINPLUS

## (undated) DEVICE — Z DISCONTINUED USE 2744636  DRESSING AQUACEL 14 IN ALG W3.5XL14IN POLYUR FLM CVR W/ HYDRCOLL